# Patient Record
Sex: MALE | Race: WHITE | Employment: OTHER | URBAN - METROPOLITAN AREA
[De-identification: names, ages, dates, MRNs, and addresses within clinical notes are randomized per-mention and may not be internally consistent; named-entity substitution may affect disease eponyms.]

---

## 2017-01-10 ENCOUNTER — OFFICE VISIT (OUTPATIENT)
Dept: FAMILY MEDICINE CLINIC | Age: 67
End: 2017-01-10

## 2017-01-10 VITALS
HEIGHT: 68 IN | TEMPERATURE: 96.6 F | RESPIRATION RATE: 16 BRPM | BODY MASS INDEX: 29.77 KG/M2 | DIASTOLIC BLOOD PRESSURE: 86 MMHG | SYSTOLIC BLOOD PRESSURE: 130 MMHG | WEIGHT: 196.4 LBS | HEART RATE: 80 BPM

## 2017-01-10 DIAGNOSIS — R73.9 ELEVATED BLOOD SUGAR: ICD-10-CM

## 2017-01-10 DIAGNOSIS — Z12.5 SPECIAL SCREENING FOR MALIGNANT NEOPLASM OF PROSTATE: ICD-10-CM

## 2017-01-10 DIAGNOSIS — I10 ESSENTIAL HYPERTENSION: ICD-10-CM

## 2017-01-10 DIAGNOSIS — M15.9 PRIMARY OSTEOARTHRITIS INVOLVING MULTIPLE JOINTS: ICD-10-CM

## 2017-01-10 DIAGNOSIS — I10 ESSENTIAL HYPERTENSION: Primary | ICD-10-CM

## 2017-01-10 DIAGNOSIS — E78.2 MIXED HYPERLIPIDEMIA: ICD-10-CM

## 2017-01-10 DIAGNOSIS — M25.561 RECURRENT PAIN OF RIGHT KNEE: ICD-10-CM

## 2017-01-10 LAB
ALBUMIN SERPL-MCNC: 5 G/DL (ref 3.9–4.9)
ALP BLD-CCNC: 87 U/L (ref 35–104)
ALT SERPL-CCNC: 23 U/L (ref 0–41)
ANION GAP SERPL CALCULATED.3IONS-SCNC: 15 MEQ/L (ref 7–13)
AST SERPL-CCNC: 22 U/L (ref 0–40)
BASOPHILS ABSOLUTE: 0 K/UL (ref 0–0.2)
BASOPHILS RELATIVE PERCENT: 0.5 %
BILIRUB SERPL-MCNC: 0.6 MG/DL (ref 0–1.2)
BUN BLDV-MCNC: 18 MG/DL (ref 8–23)
CALCIUM SERPL-MCNC: 9.7 MG/DL (ref 8.6–10.2)
CHLORIDE BLD-SCNC: 100 MEQ/L (ref 98–107)
CHOLESTEROL, TOTAL: 276 MG/DL (ref 0–199)
CO2: 25 MEQ/L (ref 22–29)
CREAT SERPL-MCNC: 1.32 MG/DL (ref 0.7–1.2)
EOSINOPHILS ABSOLUTE: 0.2 K/UL (ref 0–0.7)
EOSINOPHILS RELATIVE PERCENT: 4.3 %
GFR AFRICAN AMERICAN: >60
GFR NON-AFRICAN AMERICAN: 54
GLOBULIN: 2 G/DL (ref 2.3–3.5)
GLUCOSE BLD-MCNC: 103 MG/DL (ref 74–109)
HBA1C MFR BLD: 5.7 % (ref 4.8–5.9)
HCT VFR BLD CALC: 44.1 % (ref 42–52)
HDLC SERPL-MCNC: 45 MG/DL (ref 40–59)
HEMOGLOBIN: 15.5 G/DL (ref 14–18)
LDL CHOLESTEROL CALCULATED: 205 MG/DL (ref 0–129)
LYMPHOCYTES ABSOLUTE: 1.4 K/UL (ref 1–4.8)
LYMPHOCYTES RELATIVE PERCENT: 29.5 %
MCH RBC QN AUTO: 31.4 PG (ref 27–31.3)
MCHC RBC AUTO-ENTMCNC: 35.1 % (ref 33–37)
MCV RBC AUTO: 89.5 FL (ref 80–100)
MONOCYTES ABSOLUTE: 0.3 K/UL (ref 0.2–0.8)
MONOCYTES RELATIVE PERCENT: 6.2 %
NEUTROPHILS ABSOLUTE: 2.9 K/UL (ref 1.4–6.5)
NEUTROPHILS RELATIVE PERCENT: 59.5 %
PDW BLD-RTO: 12.9 % (ref 11.5–14.5)
PLATELET # BLD: 174 K/UL (ref 130–400)
POTASSIUM SERPL-SCNC: 4.9 MEQ/L (ref 3.5–5.1)
PROSTATE SPECIFIC ANTIGEN: 5.02 NG/ML (ref 0–5.4)
RBC # BLD: 4.93 M/UL (ref 4.7–6.1)
SODIUM BLD-SCNC: 140 MEQ/L (ref 132–144)
TOTAL PROTEIN: 7 G/DL (ref 6.4–8.1)
TRIGL SERPL-MCNC: 130 MG/DL (ref 0–200)
WBC # BLD: 4.9 K/UL (ref 4.8–10.8)

## 2017-01-10 PROCEDURE — 99214 OFFICE O/P EST MOD 30 MIN: CPT | Performed by: FAMILY MEDICINE

## 2017-01-10 RX ORDER — METHYLPREDNISOLONE 4 MG/1
TABLET ORAL
Qty: 1 KIT | Refills: 0 | Status: SHIPPED | OUTPATIENT
Start: 2017-01-10 | End: 2017-07-18 | Stop reason: ALTCHOICE

## 2017-01-10 ASSESSMENT — ENCOUNTER SYMPTOMS
COUGH: 0
CONSTIPATION: 0
ABDOMINAL PAIN: 0
NAUSEA: 0
EYES NEGATIVE: 1
DIARRHEA: 0
SHORTNESS OF BREATH: 0

## 2017-01-16 ENCOUNTER — TELEPHONE (OUTPATIENT)
Dept: FAMILY MEDICINE CLINIC | Age: 67
End: 2017-01-16

## 2017-01-20 ENCOUNTER — HOSPITAL ENCOUNTER (OUTPATIENT)
Dept: NUTRITION | Age: 67
Discharge: HOME | End: 2017-01-20
Payer: MEDICARE

## 2017-01-20 PROCEDURE — 97802 MEDICAL NUTRITION INDIV IN: CPT

## 2017-07-18 ENCOUNTER — OFFICE VISIT (OUTPATIENT)
Dept: FAMILY MEDICINE CLINIC | Age: 67
End: 2017-07-18

## 2017-07-18 VITALS
RESPIRATION RATE: 12 BRPM | WEIGHT: 200.4 LBS | DIASTOLIC BLOOD PRESSURE: 86 MMHG | HEIGHT: 68 IN | TEMPERATURE: 97.3 F | HEART RATE: 68 BPM | SYSTOLIC BLOOD PRESSURE: 138 MMHG | BODY MASS INDEX: 30.37 KG/M2

## 2017-07-18 DIAGNOSIS — R79.89 LOW TESTOSTERONE: ICD-10-CM

## 2017-07-18 DIAGNOSIS — E78.2 MIXED HYPERLIPIDEMIA: ICD-10-CM

## 2017-07-18 DIAGNOSIS — H92.01 RIGHT EAR PAIN: Primary | ICD-10-CM

## 2017-07-18 DIAGNOSIS — I10 ESSENTIAL HYPERTENSION: ICD-10-CM

## 2017-07-18 DIAGNOSIS — J01.00 ACUTE MAXILLARY SINUSITIS, RECURRENCE NOT SPECIFIED: ICD-10-CM

## 2017-07-18 PROCEDURE — 1123F ACP DISCUSS/DSCN MKR DOCD: CPT | Performed by: FAMILY MEDICINE

## 2017-07-18 PROCEDURE — 1036F TOBACCO NON-USER: CPT | Performed by: FAMILY MEDICINE

## 2017-07-18 PROCEDURE — G8427 DOCREV CUR MEDS BY ELIG CLIN: HCPCS | Performed by: FAMILY MEDICINE

## 2017-07-18 PROCEDURE — G8417 CALC BMI ABV UP PARAM F/U: HCPCS | Performed by: FAMILY MEDICINE

## 2017-07-18 PROCEDURE — 3017F COLORECTAL CA SCREEN DOC REV: CPT | Performed by: FAMILY MEDICINE

## 2017-07-18 PROCEDURE — 4040F PNEUMOC VAC/ADMIN/RCVD: CPT | Performed by: FAMILY MEDICINE

## 2017-07-18 PROCEDURE — 99213 OFFICE O/P EST LOW 20 MIN: CPT | Performed by: FAMILY MEDICINE

## 2017-07-18 RX ORDER — CEFUROXIME AXETIL 250 MG/1
250 TABLET ORAL 2 TIMES DAILY
Qty: 20 TABLET | Refills: 0 | Status: SHIPPED | OUTPATIENT
Start: 2017-07-18 | End: 2017-07-28

## 2017-07-18 ASSESSMENT — ENCOUNTER SYMPTOMS
NAUSEA: 0
EYES NEGATIVE: 1
RHINORRHEA: 1
SHORTNESS OF BREATH: 0
DIARRHEA: 0
CONSTIPATION: 0
COUGH: 0
SINUS PRESSURE: 1
ABDOMINAL PAIN: 0

## 2017-07-18 ASSESSMENT — PATIENT HEALTH QUESTIONNAIRE - PHQ9
SUM OF ALL RESPONSES TO PHQ QUESTIONS 1-9: 0
SUM OF ALL RESPONSES TO PHQ9 QUESTIONS 1 & 2: 0
2. FEELING DOWN, DEPRESSED OR HOPELESS: 0
1. LITTLE INTEREST OR PLEASURE IN DOING THINGS: 0

## 2017-07-26 ENCOUNTER — TELEPHONE (OUTPATIENT)
Dept: FAMILY MEDICINE CLINIC | Age: 67
End: 2017-07-26

## 2017-07-29 ENCOUNTER — OFFICE VISIT (OUTPATIENT)
Dept: PRIMARY CARE CLINIC | Age: 67
End: 2017-07-29

## 2017-07-29 VITALS
SYSTOLIC BLOOD PRESSURE: 130 MMHG | RESPIRATION RATE: 16 BRPM | BODY MASS INDEX: 29.7 KG/M2 | TEMPERATURE: 97.9 F | HEIGHT: 68 IN | WEIGHT: 196 LBS | DIASTOLIC BLOOD PRESSURE: 82 MMHG | OXYGEN SATURATION: 97 % | HEART RATE: 93 BPM

## 2017-07-29 DIAGNOSIS — E87.8 ELECTROLYTE IMBALANCE: ICD-10-CM

## 2017-07-29 DIAGNOSIS — A04.72: ICD-10-CM

## 2017-07-29 DIAGNOSIS — I10 ESSENTIAL HYPERTENSION: ICD-10-CM

## 2017-07-29 DIAGNOSIS — A04.72: Primary | ICD-10-CM

## 2017-07-29 LAB
ALBUMIN SERPL-MCNC: 4.5 G/DL (ref 3.9–4.9)
ALP BLD-CCNC: 88 U/L (ref 35–104)
ALT SERPL-CCNC: 18 U/L (ref 0–41)
ANION GAP SERPL CALCULATED.3IONS-SCNC: 16 MEQ/L (ref 7–13)
AST SERPL-CCNC: 17 U/L (ref 0–40)
BASOPHILS ABSOLUTE: 0 K/UL (ref 0–0.2)
BASOPHILS RELATIVE PERCENT: 0.3 %
BILIRUB SERPL-MCNC: 0.8 MG/DL (ref 0–1.2)
BUN BLDV-MCNC: 14 MG/DL (ref 8–23)
CALCIUM SERPL-MCNC: 9.1 MG/DL (ref 8.6–10.2)
CHLORIDE BLD-SCNC: 99 MEQ/L (ref 98–107)
CO2: 24 MEQ/L (ref 22–29)
CREAT SERPL-MCNC: 1.17 MG/DL (ref 0.7–1.2)
EOSINOPHILS ABSOLUTE: 0.4 K/UL (ref 0–0.7)
EOSINOPHILS RELATIVE PERCENT: 4.6 %
GFR AFRICAN AMERICAN: >60
GFR NON-AFRICAN AMERICAN: >60
GLOBULIN: 2.7 G/DL (ref 2.3–3.5)
GLUCOSE BLD-MCNC: 91 MG/DL (ref 74–109)
HCT VFR BLD CALC: 45.7 % (ref 42–52)
HEMOGLOBIN: 15.4 G/DL (ref 14–18)
LYMPHOCYTES ABSOLUTE: 1.9 K/UL (ref 1–4.8)
LYMPHOCYTES RELATIVE PERCENT: 20.4 %
MCH RBC QN AUTO: 30.5 PG (ref 27–31.3)
MCHC RBC AUTO-ENTMCNC: 33.7 % (ref 33–37)
MCV RBC AUTO: 90.5 FL (ref 80–100)
MONOCYTES ABSOLUTE: 0.8 K/UL (ref 0.2–0.8)
MONOCYTES RELATIVE PERCENT: 9 %
NEUTROPHILS ABSOLUTE: 6 K/UL (ref 1.4–6.5)
NEUTROPHILS RELATIVE PERCENT: 65.7 %
PDW BLD-RTO: 12.6 % (ref 11.5–14.5)
PLATELET # BLD: 172 K/UL (ref 130–400)
POTASSIUM SERPL-SCNC: 4.4 MEQ/L (ref 3.5–5.1)
RBC # BLD: 5.05 M/UL (ref 4.7–6.1)
SODIUM BLD-SCNC: 139 MEQ/L (ref 132–144)
TOTAL PROTEIN: 7.2 G/DL (ref 6.4–8.1)
WBC # BLD: 9.2 K/UL (ref 4.8–10.8)

## 2017-07-29 PROCEDURE — 1036F TOBACCO NON-USER: CPT | Performed by: FAMILY MEDICINE

## 2017-07-29 PROCEDURE — G8427 DOCREV CUR MEDS BY ELIG CLIN: HCPCS | Performed by: FAMILY MEDICINE

## 2017-07-29 PROCEDURE — 1123F ACP DISCUSS/DSCN MKR DOCD: CPT | Performed by: FAMILY MEDICINE

## 2017-07-29 PROCEDURE — 3017F COLORECTAL CA SCREEN DOC REV: CPT | Performed by: FAMILY MEDICINE

## 2017-07-29 PROCEDURE — 99213 OFFICE O/P EST LOW 20 MIN: CPT | Performed by: FAMILY MEDICINE

## 2017-07-29 PROCEDURE — 4040F PNEUMOC VAC/ADMIN/RCVD: CPT | Performed by: FAMILY MEDICINE

## 2017-07-29 PROCEDURE — G8417 CALC BMI ABV UP PARAM F/U: HCPCS | Performed by: FAMILY MEDICINE

## 2017-07-29 RX ORDER — METRONIDAZOLE 500 MG/1
500 TABLET ORAL 3 TIMES DAILY
Qty: 30 TABLET | Refills: 0 | Status: SHIPPED | OUTPATIENT
Start: 2017-07-29 | End: 2017-08-08

## 2017-07-29 ASSESSMENT — ENCOUNTER SYMPTOMS
COUGH: 0
CONSTIPATION: 0
BACK PAIN: 0
WHEEZING: 0
DIARRHEA: 1
SHORTNESS OF BREATH: 0
SORE THROAT: 0
NAUSEA: 0
VOMITING: 0
ABDOMINAL PAIN: 0

## 2017-07-31 ENCOUNTER — TELEPHONE (OUTPATIENT)
Dept: FAMILY MEDICINE CLINIC | Age: 67
End: 2017-07-31

## 2017-08-01 DIAGNOSIS — A04.72: ICD-10-CM

## 2017-08-02 LAB — CLOSTRIDIUM DIFFICILE DNA AMPLIFICATION: NORMAL

## 2017-08-03 ENCOUNTER — TELEPHONE (OUTPATIENT)
Dept: PRIMARY CARE CLINIC | Age: 67
End: 2017-08-03

## 2017-08-12 ASSESSMENT — ENCOUNTER SYMPTOMS: BLOOD IN STOOL: 0

## 2017-09-05 ENCOUNTER — OFFICE VISIT (OUTPATIENT)
Dept: FAMILY MEDICINE CLINIC | Age: 67
End: 2017-09-05

## 2017-09-05 VITALS
RESPIRATION RATE: 12 BRPM | TEMPERATURE: 97.2 F | HEART RATE: 80 BPM | BODY MASS INDEX: 30.46 KG/M2 | HEIGHT: 68 IN | WEIGHT: 201 LBS | SYSTOLIC BLOOD PRESSURE: 118 MMHG | DIASTOLIC BLOOD PRESSURE: 84 MMHG

## 2017-09-05 DIAGNOSIS — J01.00 ACUTE MAXILLARY SINUSITIS, RECURRENCE NOT SPECIFIED: ICD-10-CM

## 2017-09-05 DIAGNOSIS — I10 ESSENTIAL HYPERTENSION: ICD-10-CM

## 2017-09-05 DIAGNOSIS — H92.01 RIGHT EAR PAIN: Primary | ICD-10-CM

## 2017-09-05 PROCEDURE — 1123F ACP DISCUSS/DSCN MKR DOCD: CPT | Performed by: FAMILY MEDICINE

## 2017-09-05 PROCEDURE — 1036F TOBACCO NON-USER: CPT | Performed by: FAMILY MEDICINE

## 2017-09-05 PROCEDURE — G8417 CALC BMI ABV UP PARAM F/U: HCPCS | Performed by: FAMILY MEDICINE

## 2017-09-05 PROCEDURE — G8427 DOCREV CUR MEDS BY ELIG CLIN: HCPCS | Performed by: FAMILY MEDICINE

## 2017-09-05 PROCEDURE — 99213 OFFICE O/P EST LOW 20 MIN: CPT | Performed by: FAMILY MEDICINE

## 2017-09-05 PROCEDURE — 3017F COLORECTAL CA SCREEN DOC REV: CPT | Performed by: FAMILY MEDICINE

## 2017-09-05 PROCEDURE — 4040F PNEUMOC VAC/ADMIN/RCVD: CPT | Performed by: FAMILY MEDICINE

## 2017-09-05 RX ORDER — AMLODIPINE BESYLATE 10 MG/1
TABLET ORAL
COMMUNITY
Start: 2017-09-05 | End: 2018-03-27 | Stop reason: ALTCHOICE

## 2017-09-05 RX ORDER — AZITHROMYCIN 250 MG/1
TABLET, FILM COATED ORAL
Qty: 6 TABLET | Refills: 0 | Status: SHIPPED | OUTPATIENT
Start: 2017-09-05 | End: 2017-09-28 | Stop reason: ALTCHOICE

## 2017-09-05 ASSESSMENT — ENCOUNTER SYMPTOMS
DIARRHEA: 0
SINUS PRESSURE: 1
CONSTIPATION: 0
ABDOMINAL PAIN: 0
EYES NEGATIVE: 1
SHORTNESS OF BREATH: 0
RHINORRHEA: 1
COUGH: 0
NAUSEA: 0

## 2017-09-09 ENCOUNTER — TELEPHONE (OUTPATIENT)
Dept: FAMILY MEDICINE CLINIC | Age: 67
End: 2017-09-09

## 2017-09-28 ENCOUNTER — OFFICE VISIT (OUTPATIENT)
Dept: FAMILY MEDICINE CLINIC | Age: 67
End: 2017-09-28

## 2017-09-28 VITALS
HEIGHT: 68 IN | HEART RATE: 72 BPM | WEIGHT: 199.4 LBS | BODY MASS INDEX: 30.22 KG/M2 | TEMPERATURE: 96.2 F | DIASTOLIC BLOOD PRESSURE: 86 MMHG | RESPIRATION RATE: 16 BRPM | SYSTOLIC BLOOD PRESSURE: 130 MMHG

## 2017-09-28 DIAGNOSIS — J01.00 ACUTE MAXILLARY SINUSITIS, RECURRENCE NOT SPECIFIED: ICD-10-CM

## 2017-09-28 DIAGNOSIS — H69.81 EUSTACHIAN TUBE DYSFUNCTION, RIGHT: Primary | ICD-10-CM

## 2017-09-28 DIAGNOSIS — H92.01 RIGHT EAR PAIN: ICD-10-CM

## 2017-09-28 DIAGNOSIS — I10 ESSENTIAL HYPERTENSION: ICD-10-CM

## 2017-09-28 PROCEDURE — 3017F COLORECTAL CA SCREEN DOC REV: CPT | Performed by: FAMILY MEDICINE

## 2017-09-28 PROCEDURE — 1123F ACP DISCUSS/DSCN MKR DOCD: CPT | Performed by: FAMILY MEDICINE

## 2017-09-28 PROCEDURE — 1036F TOBACCO NON-USER: CPT | Performed by: FAMILY MEDICINE

## 2017-09-28 PROCEDURE — 4040F PNEUMOC VAC/ADMIN/RCVD: CPT | Performed by: FAMILY MEDICINE

## 2017-09-28 PROCEDURE — G8417 CALC BMI ABV UP PARAM F/U: HCPCS | Performed by: FAMILY MEDICINE

## 2017-09-28 PROCEDURE — 99213 OFFICE O/P EST LOW 20 MIN: CPT | Performed by: FAMILY MEDICINE

## 2017-09-28 PROCEDURE — G8427 DOCREV CUR MEDS BY ELIG CLIN: HCPCS | Performed by: FAMILY MEDICINE

## 2017-09-28 RX ORDER — AZITHROMYCIN 250 MG/1
TABLET, FILM COATED ORAL
Qty: 6 TABLET | Refills: 0 | Status: SHIPPED | OUTPATIENT
Start: 2017-09-28 | End: 2018-03-27 | Stop reason: ALTCHOICE

## 2017-09-28 RX ORDER — MOMETASONE FUROATE 50 UG/1
2 SPRAY, METERED NASAL DAILY
Qty: 1 INHALER | Refills: 3 | Status: SHIPPED | OUTPATIENT
Start: 2017-09-28 | End: 2018-03-27 | Stop reason: ALTCHOICE

## 2017-09-28 ASSESSMENT — ENCOUNTER SYMPTOMS
CONSTIPATION: 0
SHORTNESS OF BREATH: 0
COUGH: 0
EYES NEGATIVE: 1
NAUSEA: 0
DIARRHEA: 0
ABDOMINAL PAIN: 0

## 2018-03-27 ENCOUNTER — OFFICE VISIT (OUTPATIENT)
Dept: FAMILY MEDICINE CLINIC | Age: 68
End: 2018-03-27
Payer: MEDICARE

## 2018-03-27 VITALS
HEART RATE: 85 BPM | HEIGHT: 67 IN | TEMPERATURE: 98.1 F | WEIGHT: 201.4 LBS | RESPIRATION RATE: 20 BRPM | SYSTOLIC BLOOD PRESSURE: 138 MMHG | BODY MASS INDEX: 31.61 KG/M2 | DIASTOLIC BLOOD PRESSURE: 88 MMHG | OXYGEN SATURATION: 92 %

## 2018-03-27 DIAGNOSIS — Z00.00 ROUTINE GENERAL MEDICAL EXAMINATION AT A HEALTH CARE FACILITY: ICD-10-CM

## 2018-03-27 DIAGNOSIS — E78.00 HYPERCHOLESTEREMIA: ICD-10-CM

## 2018-03-27 DIAGNOSIS — Z12.5 SCREENING PSA (PROSTATE SPECIFIC ANTIGEN): ICD-10-CM

## 2018-03-27 DIAGNOSIS — I10 ESSENTIAL HYPERTENSION: ICD-10-CM

## 2018-03-27 DIAGNOSIS — Z12.5 SCREENING PSA (PROSTATE SPECIFIC ANTIGEN): Primary | ICD-10-CM

## 2018-03-27 DIAGNOSIS — Z00.00 MEDICARE ANNUAL WELLNESS VISIT, SUBSEQUENT: ICD-10-CM

## 2018-03-27 DIAGNOSIS — Z23 IMMUNIZATION DUE: ICD-10-CM

## 2018-03-27 DIAGNOSIS — R79.89 LOW TESTOSTERONE: ICD-10-CM

## 2018-03-27 LAB
ALBUMIN SERPL-MCNC: 4.8 G/DL (ref 3.9–4.9)
ALP BLD-CCNC: 83 U/L (ref 35–104)
ALT SERPL-CCNC: 26 U/L (ref 0–41)
ANION GAP SERPL CALCULATED.3IONS-SCNC: 17 MEQ/L (ref 7–13)
AST SERPL-CCNC: 22 U/L (ref 0–40)
BASOPHILS ABSOLUTE: 0 K/UL (ref 0–0.2)
BASOPHILS RELATIVE PERCENT: 0.4 %
BILIRUB SERPL-MCNC: 0.6 MG/DL (ref 0–1.2)
BUN BLDV-MCNC: 13 MG/DL (ref 8–23)
CALCIUM SERPL-MCNC: 9.1 MG/DL (ref 8.6–10.2)
CHLORIDE BLD-SCNC: 98 MEQ/L (ref 98–107)
CHOLESTEROL, TOTAL: 258 MG/DL (ref 0–199)
CO2: 25 MEQ/L (ref 22–29)
CREAT SERPL-MCNC: 1.04 MG/DL (ref 0.7–1.2)
EOSINOPHILS ABSOLUTE: 0.1 K/UL (ref 0–0.7)
EOSINOPHILS RELATIVE PERCENT: 1.8 %
GFR AFRICAN AMERICAN: >60
GFR NON-AFRICAN AMERICAN: >60
GLOBULIN: 2.4 G/DL (ref 2.3–3.5)
GLUCOSE BLD-MCNC: 90 MG/DL (ref 74–109)
HCT VFR BLD CALC: 44.9 % (ref 42–52)
HDLC SERPL-MCNC: 42 MG/DL (ref 40–59)
HEMOGLOBIN: 15.3 G/DL (ref 14–18)
LDL CHOLESTEROL CALCULATED: 196 MG/DL (ref 0–129)
LYMPHOCYTES ABSOLUTE: 1.1 K/UL (ref 1–4.8)
LYMPHOCYTES RELATIVE PERCENT: 16.5 %
MCH RBC QN AUTO: 31.5 PG (ref 27–31.3)
MCHC RBC AUTO-ENTMCNC: 34.1 % (ref 33–37)
MCV RBC AUTO: 92.3 FL (ref 80–100)
MONOCYTES ABSOLUTE: 0.3 K/UL (ref 0.2–0.8)
MONOCYTES RELATIVE PERCENT: 4.9 %
NEUTROPHILS ABSOLUTE: 5.2 K/UL (ref 1.4–6.5)
NEUTROPHILS RELATIVE PERCENT: 76.4 %
PDW BLD-RTO: 12.9 % (ref 11.5–14.5)
PLATELET # BLD: 164 K/UL (ref 130–400)
POTASSIUM SERPL-SCNC: 4.2 MEQ/L (ref 3.5–5.1)
PROSTATE SPECIFIC ANTIGEN: 6.08 NG/ML (ref 0–5.4)
RBC # BLD: 4.86 M/UL (ref 4.7–6.1)
SODIUM BLD-SCNC: 140 MEQ/L (ref 132–144)
TOTAL PROTEIN: 7.2 G/DL (ref 6.4–8.1)
TRIGL SERPL-MCNC: 102 MG/DL (ref 0–200)
WBC # BLD: 6.8 K/UL (ref 4.8–10.8)

## 2018-03-27 PROCEDURE — G0009 ADMIN PNEUMOCOCCAL VACCINE: HCPCS | Performed by: FAMILY MEDICINE

## 2018-03-27 PROCEDURE — G0438 PPPS, INITIAL VISIT: HCPCS | Performed by: FAMILY MEDICINE

## 2018-03-27 PROCEDURE — 90732 PPSV23 VACC 2 YRS+ SUBQ/IM: CPT | Performed by: FAMILY MEDICINE

## 2018-03-27 ASSESSMENT — LIFESTYLE VARIABLES
HOW OFTEN DURING THE LAST YEAR HAVE YOU FOUND THAT YOU WERE NOT ABLE TO STOP DRINKING ONCE YOU HAD STARTED: 0
AUDIT TOTAL SCORE: 1
HAS A RELATIVE, FRIEND, DOCTOR, OR ANOTHER HEALTH PROFESSIONAL EXPRESSED CONCERN ABOUT YOUR DRINKING OR SUGGESTED YOU CUT DOWN: 0
HOW OFTEN DURING THE LAST YEAR HAVE YOU FAILED TO DO WHAT WAS NORMALLY EXPECTED FROM YOU BECAUSE OF DRINKING: 0
HOW OFTEN DO YOU HAVE SIX OR MORE DRINKS ON ONE OCCASION: 0
HOW MANY STANDARD DRINKS CONTAINING ALCOHOL DO YOU HAVE ON A TYPICAL DAY: 0
HAVE YOU OR SOMEONE ELSE BEEN INJURED AS A RESULT OF YOUR DRINKING: 0
HOW OFTEN DO YOU HAVE A DRINK CONTAINING ALCOHOL: 1
HOW OFTEN DURING THE LAST YEAR HAVE YOU HAD A FEELING OF GUILT OR REMORSE AFTER DRINKING: 0
HOW OFTEN DURING THE LAST YEAR HAVE YOU BEEN UNABLE TO REMEMBER WHAT HAPPENED THE NIGHT BEFORE BECAUSE YOU HAD BEEN DRINKING: 0
AUDIT-C TOTAL SCORE: 1
HOW OFTEN DURING THE LAST YEAR HAVE YOU NEEDED AN ALCOHOLIC DRINK FIRST THING IN THE MORNING TO GET YOURSELF GOING AFTER A NIGHT OF HEAVY DRINKING: 0

## 2018-03-27 ASSESSMENT — ANXIETY QUESTIONNAIRES: GAD7 TOTAL SCORE: 0

## 2018-03-27 ASSESSMENT — PATIENT HEALTH QUESTIONNAIRE - PHQ9: SUM OF ALL RESPONSES TO PHQ QUESTIONS 1-9: 2

## 2018-06-05 DIAGNOSIS — R97.20 ELEVATED PSA: Primary | ICD-10-CM

## 2018-06-06 DIAGNOSIS — R97.20 ELEVATED PSA: ICD-10-CM

## 2018-06-06 DIAGNOSIS — R97.20 ELEVATED PSA: Primary | ICD-10-CM

## 2018-06-08 LAB
PROSTATE SPECIFIC ANTIGEN FREE: 0.7 UG/L
PROSTATE SPECIFIC ANTIGEN PERCENT FREE: 14.9 %
PROSTATE SPECIFIC ANTIGEN: 4.7 UG/L (ref 0–4)

## 2018-06-12 ENCOUNTER — TELEPHONE (OUTPATIENT)
Dept: FAMILY MEDICINE CLINIC | Age: 68
End: 2018-06-12

## 2023-02-27 LAB
ALANINE AMINOTRANSFERASE (SGPT) (U/L) IN SER/PLAS: 15 U/L (ref 10–52)
CHOLESTEROL (MG/DL) IN SER/PLAS: 193 MG/DL (ref 0–199)
CHOLESTEROL IN HDL (MG/DL) IN SER/PLAS: 46.9 MG/DL
CHOLESTEROL/HDL RATIO: 4.1
LDL: 118 MG/DL (ref 0–99)
TRIGLYCERIDE (MG/DL) IN SER/PLAS: 141 MG/DL (ref 0–149)
VLDL: 28 MG/DL (ref 0–40)

## 2023-04-24 ENCOUNTER — TELEPHONE (OUTPATIENT)
Dept: PRIMARY CARE | Facility: CLINIC | Age: 73
End: 2023-04-24
Payer: MEDICARE

## 2023-04-24 DIAGNOSIS — R53.83 OTHER FATIGUE: ICD-10-CM

## 2023-04-24 NOTE — TELEPHONE ENCOUNTER
Pt is calling because he states he hasn't had labs done in about a year. Wanted to know if you could order what labs he will need.     Thanks    Pt's # [phone]    DOL visit 1/9/23

## 2023-04-25 ENCOUNTER — TELEPHONE (OUTPATIENT)
Dept: PRIMARY CARE | Facility: CLINIC | Age: 73
End: 2023-04-25
Payer: MEDICARE

## 2023-04-25 DIAGNOSIS — E78.00 HYPERCHOLESTEROLEMIA: Primary | ICD-10-CM

## 2023-04-25 DIAGNOSIS — R73.9 ELEVATED BLOOD SUGAR: ICD-10-CM

## 2023-04-25 NOTE — TELEPHONE ENCOUNTER
PATIENT CALLING TO SEE IF YOU CAN ADD OTHER LABS.  HE IS REQUESTING A1-C GLUCOSE,KIDNEY FUNCTION AND LIPID

## 2023-05-10 ENCOUNTER — LAB (OUTPATIENT)
Dept: LAB | Facility: LAB | Age: 73
End: 2023-05-10
Payer: MEDICARE

## 2023-05-10 DIAGNOSIS — R53.83 OTHER FATIGUE: ICD-10-CM

## 2023-05-10 DIAGNOSIS — E78.00 HYPERCHOLESTEROLEMIA: ICD-10-CM

## 2023-05-10 DIAGNOSIS — R73.9 ELEVATED BLOOD SUGAR: ICD-10-CM

## 2023-05-10 LAB
ANION GAP IN SER/PLAS: 12 MMOL/L (ref 10–20)
BASOPHILS (10*3/UL) IN BLOOD BY AUTOMATED COUNT: 0.03 X10E9/L (ref 0–0.1)
BASOPHILS/100 LEUKOCYTES IN BLOOD BY AUTOMATED COUNT: 0.7 % (ref 0–2)
CALCIUM (MG/DL) IN SER/PLAS: 9.3 MG/DL (ref 8.6–10.3)
CARBON DIOXIDE, TOTAL (MMOL/L) IN SER/PLAS: 26 MMOL/L (ref 21–32)
CHLORIDE (MMOL/L) IN SER/PLAS: 104 MMOL/L (ref 98–107)
CHOLESTEROL (MG/DL) IN SER/PLAS: 204 MG/DL (ref 0–199)
CHOLESTEROL IN HDL (MG/DL) IN SER/PLAS: 44.2 MG/DL
CHOLESTEROL/HDL RATIO: 4.6
CREATININE (MG/DL) IN SER/PLAS: 1.21 MG/DL (ref 0.5–1.3)
EOSINOPHILS (10*3/UL) IN BLOOD BY AUTOMATED COUNT: 0.15 X10E9/L (ref 0–0.4)
EOSINOPHILS/100 LEUKOCYTES IN BLOOD BY AUTOMATED COUNT: 3.7 % (ref 0–6)
ERYTHROCYTE DISTRIBUTION WIDTH (RATIO) BY AUTOMATED COUNT: 12.4 % (ref 11.5–14.5)
ERYTHROCYTE MEAN CORPUSCULAR HEMOGLOBIN CONCENTRATION (G/DL) BY AUTOMATED: 33.6 G/DL (ref 32–36)
ERYTHROCYTE MEAN CORPUSCULAR VOLUME (FL) BY AUTOMATED COUNT: 93 FL (ref 80–100)
ERYTHROCYTES (10*6/UL) IN BLOOD BY AUTOMATED COUNT: 4.46 X10E12/L (ref 4.5–5.9)
ESTIMATED AVERAGE GLUCOSE FOR HBA1C: 114 MG/DL
GFR MALE: 63 ML/MIN/1.73M2
GLUCOSE (MG/DL) IN SER/PLAS: 114 MG/DL (ref 74–99)
HEMATOCRIT (%) IN BLOOD BY AUTOMATED COUNT: 41.4 % (ref 41–52)
HEMOGLOBIN (G/DL) IN BLOOD: 13.9 G/DL (ref 13.5–17.5)
HEMOGLOBIN A1C/HEMOGLOBIN TOTAL IN BLOOD: 5.6 %
IMMATURE GRANULOCYTES/100 LEUKOCYTES IN BLOOD BY AUTOMATED COUNT: 0.2 % (ref 0–0.9)
LDL: 136 MG/DL (ref 0–99)
LEUKOCYTES (10*3/UL) IN BLOOD BY AUTOMATED COUNT: 4 X10E9/L (ref 4.4–11.3)
LYMPHOCYTES (10*3/UL) IN BLOOD BY AUTOMATED COUNT: 1.15 X10E9/L (ref 0.8–3)
LYMPHOCYTES/100 LEUKOCYTES IN BLOOD BY AUTOMATED COUNT: 28.6 % (ref 13–44)
MONOCYTES (10*3/UL) IN BLOOD BY AUTOMATED COUNT: 0.28 X10E9/L (ref 0.05–0.8)
MONOCYTES/100 LEUKOCYTES IN BLOOD BY AUTOMATED COUNT: 7 % (ref 2–10)
NEUTROPHILS (10*3/UL) IN BLOOD BY AUTOMATED COUNT: 2.4 X10E9/L (ref 1.6–5.5)
NEUTROPHILS/100 LEUKOCYTES IN BLOOD BY AUTOMATED COUNT: 59.8 % (ref 40–80)
PLATELETS (10*3/UL) IN BLOOD AUTOMATED COUNT: 159 X10E9/L (ref 150–450)
POTASSIUM (MMOL/L) IN SER/PLAS: 4.2 MMOL/L (ref 3.5–5.3)
SODIUM (MMOL/L) IN SER/PLAS: 138 MMOL/L (ref 136–145)
TRIGLYCERIDE (MG/DL) IN SER/PLAS: 121 MG/DL (ref 0–149)
UREA NITROGEN (MG/DL) IN SER/PLAS: 21 MG/DL (ref 6–23)
VLDL: 24 MG/DL (ref 0–40)

## 2023-05-10 PROCEDURE — 84403 ASSAY OF TOTAL TESTOSTERONE: CPT

## 2023-05-10 PROCEDURE — 80048 BASIC METABOLIC PNL TOTAL CA: CPT

## 2023-05-10 PROCEDURE — 83036 HEMOGLOBIN GLYCOSYLATED A1C: CPT

## 2023-05-10 PROCEDURE — 85025 COMPLETE CBC W/AUTO DIFF WBC: CPT

## 2023-05-10 PROCEDURE — 36415 COLL VENOUS BLD VENIPUNCTURE: CPT

## 2023-05-10 PROCEDURE — 80061 LIPID PANEL: CPT

## 2023-05-10 NOTE — PROGRESS NOTES
Patient called, he is at the lab and requested a urinalysis to check kidney function and testosterone level.   I did explain to him that a urinalysis does not check kidney function, and that the kidney function would be checked via blood work and those labs were already ordered.   I did ask Dr. Chan if it was ok to order the Testosterone level, and received a verbal okay.   Testosterone level was ordered.   Patient aware

## 2023-05-11 LAB — TESTOSTERONE (NG/DL) IN SER/PLAS: 381 NG/DL (ref 240–1000)

## 2023-05-12 ENCOUNTER — OFFICE VISIT (OUTPATIENT)
Dept: PRIMARY CARE | Facility: CLINIC | Age: 73
End: 2023-05-12
Payer: MEDICARE

## 2023-05-12 VITALS
BODY MASS INDEX: 29.16 KG/M2 | SYSTOLIC BLOOD PRESSURE: 138 MMHG | TEMPERATURE: 98.6 F | OXYGEN SATURATION: 97 % | HEIGHT: 67 IN | DIASTOLIC BLOOD PRESSURE: 80 MMHG | RESPIRATION RATE: 12 BRPM | WEIGHT: 185.8 LBS | HEART RATE: 79 BPM

## 2023-05-12 DIAGNOSIS — N18.1 STAGE 1 CHRONIC KIDNEY DISEASE: ICD-10-CM

## 2023-05-12 DIAGNOSIS — R97.20 ELEVATED PSA: ICD-10-CM

## 2023-05-12 DIAGNOSIS — Z00.00 MEDICARE ANNUAL WELLNESS VISIT, SUBSEQUENT: Primary | ICD-10-CM

## 2023-05-12 DIAGNOSIS — R25.2 MUSCLE CRAMPS: ICD-10-CM

## 2023-05-12 DIAGNOSIS — C61 PROSTATE CANCER (MULTI): ICD-10-CM

## 2023-05-12 DIAGNOSIS — E78.5 HYPERLIPIDEMIA, UNSPECIFIED HYPERLIPIDEMIA TYPE: ICD-10-CM

## 2023-05-12 DIAGNOSIS — I10 HYPERTENSION, UNSPECIFIED TYPE: ICD-10-CM

## 2023-05-12 DIAGNOSIS — F32.A DEPRESSION, UNSPECIFIED DEPRESSION TYPE: ICD-10-CM

## 2023-05-12 PROBLEM — R06.02 SOB (SHORTNESS OF BREATH): Status: RESOLVED | Noted: 2023-05-12 | Resolved: 2023-05-12

## 2023-05-12 PROBLEM — M21.6X2 EQUINUS DEFORMITY OF BOTH FEET: Status: ACTIVE | Noted: 2020-06-02

## 2023-05-12 PROBLEM — R39.9 LOWER URINARY TRACT SYMPTOMS: Status: RESOLVED | Noted: 2023-05-12 | Resolved: 2023-05-12

## 2023-05-12 PROBLEM — R53.83 FATIGUE: Status: ACTIVE | Noted: 2023-05-12

## 2023-05-12 PROBLEM — H65.90 OTITIS, SEROUS: Status: ACTIVE | Noted: 2023-05-12

## 2023-05-12 PROBLEM — R00.2 PALPITATIONS: Status: ACTIVE | Noted: 2023-05-12

## 2023-05-12 PROBLEM — M77.8 CAPSULITIS OF FOOT, RIGHT: Status: ACTIVE | Noted: 2020-06-02

## 2023-05-12 PROBLEM — M21.6X2 EQUINUS DEFORMITY OF BOTH FEET: Status: RESOLVED | Noted: 2020-06-02 | Resolved: 2023-05-12

## 2023-05-12 PROBLEM — R39.9 LOWER URINARY TRACT SYMPTOMS: Status: ACTIVE | Noted: 2023-05-12

## 2023-05-12 PROBLEM — N41.9 PROSTATITIS: Status: ACTIVE | Noted: 2023-05-12

## 2023-05-12 PROBLEM — K21.9 GASTROESOPHAGEAL REFLUX DISEASE: Status: ACTIVE | Noted: 2023-05-12

## 2023-05-12 PROBLEM — M77.41 METATARSALGIA OF RIGHT FOOT: Status: ACTIVE | Noted: 2020-06-02

## 2023-05-12 PROBLEM — G89.18 POST-OP PAIN: Status: ACTIVE | Noted: 2019-03-23

## 2023-05-12 PROBLEM — K21.9 GASTROESOPHAGEAL REFLUX DISEASE: Status: RESOLVED | Noted: 2023-05-12 | Resolved: 2023-05-12

## 2023-05-12 PROBLEM — R01.1 HEART MURMUR: Status: ACTIVE | Noted: 2023-05-12

## 2023-05-12 PROBLEM — H65.90 OTITIS, SEROUS: Status: RESOLVED | Noted: 2023-05-12 | Resolved: 2023-05-12

## 2023-05-12 PROBLEM — L90.9 FAT PAD ATROPHY OF FOOT: Status: ACTIVE | Noted: 2020-06-02

## 2023-05-12 PROBLEM — R06.02 SOB (SHORTNESS OF BREATH): Status: ACTIVE | Noted: 2023-05-12

## 2023-05-12 PROBLEM — M79.605 PAIN OF LEFT LOWER EXTREMITY: Status: ACTIVE | Noted: 2023-05-12

## 2023-05-12 PROBLEM — E34.9 HYPOTESTOSTERONISM: Status: ACTIVE | Noted: 2023-05-12

## 2023-05-12 PROBLEM — K21.9 GERD (GASTROESOPHAGEAL REFLUX DISEASE): Status: ACTIVE | Noted: 2023-05-12

## 2023-05-12 PROBLEM — E78.00 HIGH CHOLESTEROL: Status: RESOLVED | Noted: 2023-05-12 | Resolved: 2023-05-12

## 2023-05-12 PROBLEM — R93.1 ABNORMAL ECHOCARDIOGRAM: Status: ACTIVE | Noted: 2023-05-12

## 2023-05-12 PROBLEM — G57.60 MORTON'S NEUROMA: Status: ACTIVE | Noted: 2023-05-12

## 2023-05-12 PROBLEM — G89.18 POST-OP PAIN: Status: RESOLVED | Noted: 2019-03-23 | Resolved: 2023-05-12

## 2023-05-12 PROBLEM — M21.6X1 ACQUIRED ABDUCTION DEFORMITY OF RIGHT FOOT: Status: ACTIVE | Noted: 2019-03-19

## 2023-05-12 PROBLEM — I35.0 AORTIC VALVE STENOSIS: Status: ACTIVE | Noted: 2022-06-03

## 2023-05-12 PROBLEM — N41.9 PROSTATITIS: Status: RESOLVED | Noted: 2023-05-12 | Resolved: 2023-05-12

## 2023-05-12 PROBLEM — M21.6X1 EQUINUS DEFORMITY OF BOTH FEET: Status: ACTIVE | Noted: 2020-06-02

## 2023-05-12 PROBLEM — N43.40 SPERMATOCELE: Status: ACTIVE | Noted: 2023-05-12

## 2023-05-12 PROBLEM — M21.6X1 EQUINUS DEFORMITY OF BOTH FEET: Status: RESOLVED | Noted: 2020-06-02 | Resolved: 2023-05-12

## 2023-05-12 PROBLEM — M54.50 LOW BACK PAIN: Status: ACTIVE | Noted: 2023-05-12

## 2023-05-12 PROBLEM — E78.00 HIGH CHOLESTEROL: Status: ACTIVE | Noted: 2023-05-12

## 2023-05-12 PROBLEM — I25.10 ATHEROSCLEROTIC HEART DISEASE OF NATIVE CORONARY ARTERY WITHOUT ANGINA PECTORIS: Status: ACTIVE | Noted: 2023-05-12

## 2023-05-12 PROBLEM — R53.1 GENERALIZED WEAKNESS: Status: ACTIVE | Noted: 2023-05-12

## 2023-05-12 PROCEDURE — 3079F DIAST BP 80-89 MM HG: CPT | Performed by: FAMILY MEDICINE

## 2023-05-12 PROCEDURE — 1170F FXNL STATUS ASSESSED: CPT | Performed by: FAMILY MEDICINE

## 2023-05-12 PROCEDURE — 1036F TOBACCO NON-USER: CPT | Performed by: FAMILY MEDICINE

## 2023-05-12 PROCEDURE — 99213 OFFICE O/P EST LOW 20 MIN: CPT | Performed by: FAMILY MEDICINE

## 2023-05-12 PROCEDURE — G0439 PPPS, SUBSEQ VISIT: HCPCS | Performed by: FAMILY MEDICINE

## 2023-05-12 PROCEDURE — 1159F MED LIST DOCD IN RCRD: CPT | Performed by: FAMILY MEDICINE

## 2023-05-12 PROCEDURE — 3075F SYST BP GE 130 - 139MM HG: CPT | Performed by: FAMILY MEDICINE

## 2023-05-12 RX ORDER — ASPIRIN 81 MG/1
1 TABLET ORAL DAILY
COMMUNITY
Start: 2014-03-24

## 2023-05-12 RX ORDER — CALCIUM CARB, CITRATE, MALATE 250 MG
CAPSULE ORAL
COMMUNITY

## 2023-05-12 RX ORDER — ROSUVASTATIN CALCIUM 5 MG/1
5 TABLET, COATED ORAL EVERY OTHER DAY
COMMUNITY
Start: 2023-03-04

## 2023-05-12 RX ORDER — AMLODIPINE BESYLATE 2.5 MG/1
2.5 TABLET ORAL DAILY
COMMUNITY
End: 2023-07-24 | Stop reason: SDUPTHER

## 2023-05-12 ASSESSMENT — PATIENT HEALTH QUESTIONNAIRE - PHQ9
2. FEELING DOWN, DEPRESSED OR HOPELESS: SEVERAL DAYS
10. IF YOU CHECKED OFF ANY PROBLEMS, HOW DIFFICULT HAVE THESE PROBLEMS MADE IT FOR YOU TO DO YOUR WORK, TAKE CARE OF THINGS AT HOME, OR GET ALONG WITH OTHER PEOPLE: NOT DIFFICULT AT ALL
1. LITTLE INTEREST OR PLEASURE IN DOING THINGS: SEVERAL DAYS
SUM OF ALL RESPONSES TO PHQ9 QUESTIONS 1 AND 2: 2

## 2023-05-12 ASSESSMENT — ACTIVITIES OF DAILY LIVING (ADL)
GROCERY_SHOPPING: INDEPENDENT
MANAGING_FINANCES: INDEPENDENT
TAKING_MEDICATION: INDEPENDENT
BATHING: INDEPENDENT
DOING_HOUSEWORK: INDEPENDENT
DRESSING: INDEPENDENT

## 2023-05-12 NOTE — PROGRESS NOTES
Subjective   Patient ID: Julio Miller is a 72 y.o. male who presents for No chief complaint on file..  HPI    ROS  Constitutional- No activity change. No appetite change.  Eyes- Denies vision changes.  Respiratory- No shortness of breath.  Cardiovascular- No palpitations. No chest pain.  GI- No nausea or vomiting. No diarrhea or constipation. Denies abdominal pain.  Musculoskeletal- Denies joint swelling.  Extremities- No edema.  Neurological- Denies headaches. Denies dizziness.  Skin- No rashes.  Psychiatric/Behavioral- Denies significant anxiety, or depressed mood.     Objective     There were no vitals taken for this visit.    Allergies   Allergen Reactions    Cat Dander Itching    Cefuroxime Axetil Diarrhea    House Dust Itching    Testosterone Other and Rash     Dermatitis with the patch form       Constitutional-- Well-nourished.  No distress  Head- unremarkable.  Ears- TMs clear.  Eyes- PERRL.  Conjunctiva normal.  Nose- Normal.  No rhinorrhea noted.  Throat- Oropharynx is clear and moist.  Neck- Supple with no thyromegaly.  No significant cervical adenopathy noted.  Pulmonary/Chest- Breath sounds normal with normal effort.  No wheezing.  Heart- Regular rate and rhythm.  No murmur.  Abdomen- Soft and non-tender.  No masses noted.  Musculoskeletal- Normal ROM.  No significant joint swelling  Extremities- No edema.   Neurological- Alert.  No noted deficits.  Skin- Warm.  No rashes.  Psychiatric/Behavioral- Mood and affect normal.  Behavior normal.     Assessment/Plan   No diagnosis found.       Long talk. Treatment options reviewed.    Continue and take your medications as prescribed.    Health Maintenance issues discussed.    Importance of healthy diet and regular exercise regimen discussed.    We will contact you with any test results ordered. If you do not hear from us, please contact.    Follow-up as instructed or sooner if any problems or symptoms do not resolve as expected.

## 2023-05-12 NOTE — PROGRESS NOTES
"Subjective   Reason for Visit: Julio Miller is an 72 y.o. male here for a Medicare Wellness visit.     Past Medical, Surgical, and Family History reviewed and updated in chart.    Reviewed all medications by prescribing practitioner or clinical pharmacist (such as prescriptions, OTCs, herbal therapies and supplements) and documented in the medical record.    HPI  Patient presents today for a follow-up on Blood Work done on 5-10-23.    States he has chronic kidney disease. Urinates a lot, has muscle cramps. Wondering if he should have a UA, or discuss this with his Urologist?  He has started increasing his potassium, and this has helped the muscle cramps at night. He is eating more vegetables. The urinary frequency, and muscle cramps are chronic issues.   Dr. Caba- Urologist    States Equinus deformity of both feet is located on his problem list. He states he has never had this issue, and doesn't know how it got on his chart.    States his night time acid reflux is gone.      Taking current medications which were reviewed.  Problem list discussed.    Overall doing well.  Eating okay.  Staying active.    Has no other new problem /question.    Patient Care Team:  Rufus Hudson MD as PCP - General  Rufus Hudson MD as PCP - [facility] Attributed Provider     Review of Systems  Constitutional- No activity change. No appetite change.  Eyes- Denies vision changes.  Respiratory- No shortness of breath.  Cardiovascular- No palpitations. No chest pain.  GI- No nausea or vomiting. No diarrhea or constipation. Denies abdominal pain.  Musculoskeletal- Denies joint swelling.  Extremities- No edema.  Neurological- Denies headaches. Denies dizziness.  Skin- No rashes.  Psychiatric/Behavioral- Denies significant anxiety, or depressed mood.  Objective   Vitals:  /80   Pulse 79   Temp 37 °C (98.6 °F)   Resp 12   Ht 1.702 m (5' 7\")   Wt 84.3 kg (185 lb 12.8 oz)   SpO2 97%   BMI 29.10 kg/m²       Physical " Exam    Constitutional-- Well-nourished.  No distress  Head- unremarkable.  Ears- TMs clear.  Eyes- PERRL.  Conjunctiva normal.  Nose- Normal.  No rhinorrhea noted.  Throat- Oropharynx is clear and moist.  Neck- Supple with no thyromegaly.  No significant cervical adenopathy noted.  Pulmonary/Chest- Breath sounds normal with normal effort.  No wheezing.  Heart- Regular rate and rhythm.  No murmur.  Abdomen- Soft and non-tender.  No masses noted.  Extremities- no edema.  Orthopedic exam significant for OA changes in the hands and knees  Mood normal      Assessment/Plan   Problem List Items Addressed This Visit          Circulatory    Hypertension       Genitourinary    Elevated PSA    Prostate cancer (CMS/[facility])    Stage 1 chronic kidney disease       Musculoskeletal    Muscle cramps       Other    Depression    Hyperlipidemia     Other Visit Diagnoses       Medicare annual wellness visit, subsequent    -  Primary               Long talk. Treatment options reviewed.  Mood stable  Hypertension controlled  Cholesterol controlled     Medicare wellness questionnaire reviewed in detail.   No problems with activities of daily living. Home safety issues reviewed.   Reminded to have an updated will if needed.    Refilled medications as requested/required   Continue and take your medications as prescribed.    Health Maintenance issues discussed.    Importance of healthy diet and regular exercise regimen discussed.    We will contact you with any test results ordered. If you do not hear from us, please contact.    Follow-up as instructed or sooner if any problems or symptoms do not resolve as expected.    Scribe Attestation  By signing my name below, IMckinley Scribe   attest that this documentation has been prepared under the direction and in the presence of Rufus Hudson MD.

## 2023-05-17 ENCOUNTER — TELEPHONE (OUTPATIENT)
Dept: PRIMARY CARE | Facility: CLINIC | Age: 73
End: 2023-05-17
Payer: MEDICARE

## 2023-05-17 DIAGNOSIS — N30.00 ACUTE CYSTITIS WITHOUT HEMATURIA: Primary | ICD-10-CM

## 2023-05-17 RX ORDER — CIPROFLOXACIN 250 MG/1
250 TABLET, FILM COATED ORAL 2 TIMES DAILY
Qty: 14 TABLET | Refills: 0 | Status: SHIPPED | OUTPATIENT
Start: 2023-05-17 | End: 2023-05-18 | Stop reason: SDUPTHER

## 2023-05-17 NOTE — TELEPHONE ENCOUNTER
PATIENT WAS HERE TO SEE YOU FOR A WELL CHECK BUT NOW HAS DEVELOPED A UTI.  HE SAID HE HAS HAD THEM BEFORE SO HE DOES KNOW WHAT THEY FEEL LIKE.  HE WANTED TO KNOW IF HE CAN HAVE AN ORDER FOR URINALYSIS OR AN ANTIBIOTIC ?    PREFERRED PHARMACY IS [facility] [address]  PLEASE ADVISE

## 2023-05-18 ENCOUNTER — TELEPHONE (OUTPATIENT)
Dept: PRIMARY CARE | Facility: CLINIC | Age: 73
End: 2023-05-18
Payer: MEDICARE

## 2023-05-18 DIAGNOSIS — N30.00 ACUTE CYSTITIS WITHOUT HEMATURIA: ICD-10-CM

## 2023-05-18 RX ORDER — CIPROFLOXACIN 250 MG/1
250 TABLET, FILM COATED ORAL 2 TIMES DAILY
Qty: 14 TABLET | Refills: 0 | Status: SHIPPED | OUTPATIENT
Start: 2023-05-18 | End: 2023-05-25

## 2023-05-18 NOTE — TELEPHONE ENCOUNTER
PATIENT THREW HIS CIPRO MEDICATION AWAY ON ACCIDENT.    HE WAS THROWING AWAY  MEDICATION AND DISCARDED IT BY ACCIDENT.  ARE YOU ABLE TO SEND IN ANOTHER SCRIPT?    ciprofloxacin (Cipro) 250 mg tablet [[id number]]    Order Details  Dose: 250 mg Route: oral Frequency: 2 times daily   Dispense Quantity: 14 tablet Refills: 0          Sig: Take 1 tablet (250 mg) by mouth 2 times a day for 7 days.     PHARMACY [id number] - [place], [place] - [address]  [address] [facility] [address]  Phone: [phone]  Fax: [phone]

## 2023-06-06 LAB — PROSTATE SPECIFIC AG (NG/ML) IN SER/PLAS: 0.62 NG/ML (ref 0–4)

## 2023-07-21 DIAGNOSIS — I10 HYPERTENSION, UNSPECIFIED TYPE: ICD-10-CM

## 2023-07-21 NOTE — TELEPHONE ENCOUNTER
Rx Refill Request Telephone Encounter    Name:  Julio Miller  : 1950     Medication Name:  Amlodipine  Dose (Optional):    2.5mg  Quantity (Optional):      Directions (Optional):   Take 1 tablet (2.5mg) by mouth once daily    ALLERGIES:   see list     Specific Pharmacy location:  [facility]    Date of last appointment:  23  Date of next appointment:  none    Best number to reach patient:  [phone]     The patient is a 69y Male complaining of lower leg pain/injury. Additional Notes: Discussed trimming instead of shaving when possible. Return to clinic with any similar lesions. Render Risk Assessment In Note?: no Detail Level: Simple

## 2023-07-24 RX ORDER — AMLODIPINE BESYLATE 2.5 MG/1
2.5 TABLET ORAL DAILY
Qty: 90 TABLET | Refills: 1 | Status: SHIPPED | OUTPATIENT
Start: 2023-07-24 | End: 2024-01-23 | Stop reason: SDUPTHER

## 2023-07-25 ENCOUNTER — TELEPHONE (OUTPATIENT)
Dept: PRIMARY CARE | Facility: CLINIC | Age: 73
End: 2023-07-25
Payer: MEDICARE

## 2023-07-25 NOTE — TELEPHONE ENCOUNTER
----- Message from Julio Miller sent at 7/24/2023 10:03 PM EDT -----  Regarding: Amlodipine Besylate  Contact: [phone]  Please renew my prescription. Thanks!  [place], [place]

## 2023-10-11 ENCOUNTER — TELEPHONE (OUTPATIENT)
Dept: PRIMARY CARE | Facility: CLINIC | Age: 73
End: 2023-10-11
Payer: MEDICARE

## 2023-10-11 ENCOUNTER — LAB (OUTPATIENT)
Dept: LAB | Facility: LAB | Age: 73
End: 2023-10-11
Payer: MEDICARE

## 2023-10-11 DIAGNOSIS — Z77.011 LEAD EXPOSURE: Primary | ICD-10-CM

## 2023-10-11 DIAGNOSIS — Z77.011 LEAD EXPOSURE: ICD-10-CM

## 2023-10-11 PROCEDURE — 83655 ASSAY OF LEAD: CPT

## 2023-10-11 PROCEDURE — 36415 COLL VENOUS BLD VENIPUNCTURE: CPT

## 2023-10-11 NOTE — TELEPHONE ENCOUNTER
Patient calling, he is concerned about being exposed to lead and is wanting to know if you can place an order for him to have labs checked for lead. Please advise.  
Pt aware  
1.2

## 2023-10-12 LAB — LEAD BLD-MCNC: 0.8 UG/DL

## 2023-12-05 ENCOUNTER — LAB (OUTPATIENT)
Dept: LAB | Facility: LAB | Age: 73
End: 2023-12-05
Payer: MEDICARE

## 2023-12-05 DIAGNOSIS — C61 MALIGNANT NEOPLASM OF PROSTATE (MULTI): Primary | ICD-10-CM

## 2023-12-05 DIAGNOSIS — C61 MALIGNANT NEOPLASM OF PROSTATE (MULTI): ICD-10-CM

## 2023-12-05 LAB — PSA SERPL-MCNC: 0.19 NG/ML

## 2023-12-05 PROCEDURE — 84153 ASSAY OF PSA TOTAL: CPT

## 2023-12-05 PROCEDURE — 36415 COLL VENOUS BLD VENIPUNCTURE: CPT

## 2023-12-06 ENCOUNTER — TELEPHONE (OUTPATIENT)
Dept: RADIATION ONCOLOGY | Facility: HOSPITAL | Age: 73
End: 2023-12-06
Payer: MEDICARE

## 2023-12-06 NOTE — PROGRESS NOTES
Cancer synopsis:  Rad/onc: Dr. Bravo/ [facility]  Urology: Dr. Caba     73 [id number] favorable intermediate risk prostate cancer, PSA 9.2, [place] 3+4=7 in 3/19 cores, 40-80%, from pirads5 PSMA PET+ anterior apex lesion, possible  EPE anteriorly on MRI. 48cc. PVR 25cc      PRIOR TREATMENT: SBRT utilizing 10 MV photons, VMAT FFF technique, 40Gy in 5 fractions of 8Gy each.  Treatment completed on 9/8/2022.  No androgen ablation used.  SpaceOAR gel and fiducials placed prior to treatment.    History of presenting illness:    Patient ID: [id number]     Julio Miller is a 73 y.o. male who presents for his FIR prostate 3+4=7 IPSA <10 s/p RT.    RT Site: Prostate  RT Date: 09/08/2022  Hormone therapy: No  Hot Flushes: Denies  Fatigue: Denies  Bone pain: Denies  CLIVE: n/a virtual  ED: Does report some erectile changes since RT.  ED medications: No  IPSS: n/a virtual  Urinary symptoms: Admits to reduced stream without flowmax. Admits urgency daily.   Urinary Medications: Yes, flowmax daily  Rectal bleeding: Denies  Other systems: Denies SOB, CP or fever.      Review of systems:  Review of Systems   Constitutional:  Negative for fatigue, fever and unexpected weight change.   Respiratory:  Negative for cough, chest tightness and shortness of breath.    Cardiovascular:  Negative for chest pain, palpitations and leg swelling.   Gastrointestinal:  Negative for abdominal pain, anal bleeding, blood in stool, constipation, diarrhea and rectal pain.   Endocrine: Negative for cold intolerance, heat intolerance and polyuria.   Genitourinary:  Negative for decreased urine volume, difficulty urinating, dysuria, frequency, hematuria and urgency.   Musculoskeletal:  Negative for arthralgias, back pain, gait problem and joint swelling.   Skin: Negative.    Allergic/Immunologic: Negative.    Neurological:  Negative for dizziness, syncope and weakness.   Psychiatric/Behavioral: Negative.         Past Medical history  Past Medical History:    Diagnosis Date    Encounter for general adult medical examination without abnormal findings 03/01/2022    Medicare annual wellness visit, subsequent    Encounter for screening for malignant neoplasm of colon     Screen for colon cancer    Encounter for screening for malignant neoplasm of colon 03/18/2022    Colon cancer screening    Encounter for screening for malignant neoplasm of prostate 10/21/2015    Screening for prostate cancer    Other conditions influencing health status 02/17/2021    History of cough    Palpitations 08/18/2013    Palpitations    Personal history of diseases of the skin and subcutaneous tissue 01/15/2016    History of contact dermatitis    Personal history of other diseases of the respiratory system 02/19/2021    History of nasal discharge    Tinea cruris     Jock itch        Surgical/family history  Family History   Problem Relation Name Age of Onset    Migraines Mother      Other (Heart palpitations) Mother      Alzheimer's disease Father      Prostatitis Father      Parkinsonism Father        Past Surgical History:   Procedure Laterality Date    OTHER SURGICAL HISTORY  04/15/2022    Foot surgery    OTHER SURGICAL HISTORY  04/15/2022    Vasectomy    OTHER SURGICAL HISTORY  04/15/2022    Excision of spermatocele    OTHER SURGICAL HISTORY  04/15/2022    Hemorrhoidectomy simple        Social History  Tobacco Use: Low Risk  (5/12/2023)    Patient History     Smoking Tobacco Use: Never     Smokeless Tobacco Use: Never     Passive Exposure: Not on file         Current med list:  Current Outpatient Medications   Medication Instructions    amLODIPine (NORVASC) 2.5 mg, oral, Daily    aspirin 81 mg EC tablet 1 tablet, oral, Daily    CALCIUM-MAGNESIUM-ZINC ORAL oral    potassium citrate 99 mg capsule oral    rosuvastatin (CRESTOR) 5 mg, oral, Every other day        Last recorded vital:  N/a Virtual    Physical exam  N/a virtual    Pertinent labs:  Prostate Specific AG   Date/Time Value Ref Range  Status   12/05/2023 12:47 PM 0.19 <=4.00 ng/mL Final         Dx:  Problem List Items Addressed This Visit    None  Visit Diagnoses       Malignant neoplasm of prostate (CMS/HCC)    -  Primary         PSA of 0.19 was reviewed and is declining. Review of latent SE including rectal bleeding, hematuria, urinary strictures, ED where reviewed as well as how to contact office if s/s present. Does report ED not concerned at this time however and will monitor. Denies latent SE. NCCN guidelines where reviewed and routine FUV of every 3m for first year and every 6m for four years for a total of five years was discussed. Patient verbalized understanding.          PLAN:  FUV 6m virtual  Labs Psa in 6m  Imaging none  Flowmax daily  FUV other providers: PCP for routine FUV, Urology PRN        Please contact office with any concerns:  Romero Scruggs CNP  [phone]

## 2023-12-07 ENCOUNTER — HOSPITAL ENCOUNTER (OUTPATIENT)
Dept: RADIATION ONCOLOGY | Facility: HOSPITAL | Age: 73
Setting detail: RADIATION/ONCOLOGY SERIES
Discharge: HOME | End: 2023-12-07
Payer: MEDICARE

## 2023-12-07 DIAGNOSIS — C61 MALIGNANT NEOPLASM OF PROSTATE (MULTI): Primary | ICD-10-CM

## 2023-12-07 PROCEDURE — 99213 OFFICE O/P EST LOW 20 MIN: CPT

## 2023-12-07 ASSESSMENT — ENCOUNTER SYMPTOMS
BACK PAIN: 0
ABDOMINAL PAIN: 0
ARTHRALGIAS: 0
PSYCHIATRIC NEGATIVE: 1
ALLERGIC/IMMUNOLOGIC NEGATIVE: 1
DYSURIA: 0
BLOOD IN STOOL: 0
RECTAL PAIN: 0
CONSTIPATION: 0
DIFFICULTY URINATING: 0
DIZZINESS: 0
CHEST TIGHTNESS: 0
UNEXPECTED WEIGHT CHANGE: 0
COUGH: 0
ANAL BLEEDING: 0
FREQUENCY: 0
JOINT SWELLING: 0
WEAKNESS: 0
FEVER: 0
DIARRHEA: 0
HEMATURIA: 0
SHORTNESS OF BREATH: 0
PALPITATIONS: 0
FATIGUE: 0

## 2023-12-08 ENCOUNTER — APPOINTMENT (OUTPATIENT)
Dept: RADIATION ONCOLOGY | Facility: HOSPITAL | Age: 73
End: 2023-12-08
Payer: MEDICARE

## 2023-12-14 ENCOUNTER — OFFICE VISIT (OUTPATIENT)
Dept: UROLOGY | Facility: CLINIC | Age: 73
End: 2023-12-14
Payer: MEDICARE

## 2023-12-14 VITALS
DIASTOLIC BLOOD PRESSURE: 77 MMHG | SYSTOLIC BLOOD PRESSURE: 154 MMHG | HEART RATE: 71 BPM | BODY MASS INDEX: 30.29 KG/M2 | WEIGHT: 193 LBS | HEIGHT: 67 IN

## 2023-12-14 DIAGNOSIS — N32.81 OVERACTIVE BLADDER: ICD-10-CM

## 2023-12-14 DIAGNOSIS — C61 PROSTATE CANCER (MULTI): Primary | ICD-10-CM

## 2023-12-14 PROBLEM — R97.20 ELEVATED PSA: Status: RESOLVED | Noted: 2023-05-12 | Resolved: 2023-12-14

## 2023-12-14 PROCEDURE — 99214 OFFICE O/P EST MOD 30 MIN: CPT | Performed by: UROLOGY

## 2023-12-14 PROCEDURE — 3077F SYST BP >= 140 MM HG: CPT | Performed by: UROLOGY

## 2023-12-14 PROCEDURE — 1160F RVW MEDS BY RX/DR IN RCRD: CPT | Performed by: UROLOGY

## 2023-12-14 PROCEDURE — 1159F MED LIST DOCD IN RCRD: CPT | Performed by: UROLOGY

## 2023-12-14 PROCEDURE — 1036F TOBACCO NON-USER: CPT | Performed by: UROLOGY

## 2023-12-14 PROCEDURE — 3078F DIAST BP <80 MM HG: CPT | Performed by: UROLOGY

## 2023-12-14 PROCEDURE — 1126F AMNT PAIN NOTED NONE PRSNT: CPT | Performed by: UROLOGY

## 2023-12-14 NOTE — PROGRESS NOTES
PRIOR NOTES  73-year-old male seeing me for prostate cancer, spermatocele, LUTS, and hypogonadism  Kindly referred by Dr. Johnsonami Gaspary, his wife, is present with him  PMH: Hypertension, depression, GERD, hyperlipidemia, hypogonadism, prior spermatocelectomy  On aspirin only  Spermatocele recurrent not bothersome  Testosterone 10/2015 - 201  Not actively taking TRT - previously tried patch but had adhesion allergy  Elevated PSA in Feb, took cipro from Feb - March, repeat still high  FHx PCa: none  PSA history:  03/22: 9.20  02/22: 8.80  11/19: 5.67  Prior biopsy: no  Additional tests done: none  Endorses weak stream, intermittency, double voiding, urgency  Denies straining, hematuria, incomplete emptying, hesitancy  NTF 1x but up to 3x (affected by caffeine and chocolate)  PVR 25cc  - Erections poor at baseline, ~50%     PROSTATE CA HX  - 05/2022 - MRI-P - 48.2g gland, PSAD 0.19, PIRADs 5 lesion anterior fibromuscular stroma w/ capsular irregularity extending to b/l t-zone and p-zone  - 06/28/2022 - INITIAL DX - OR 6/28 - 16 core template + targeted   - Path - GG2 adenoca located LA, anterior target in 3/4 fragments, 70% of tissue max (5% G4), no crib or IDC   - Intermediate risk favorable - cT1c, GG2, 3/19 cores, 5% pattern 4, PSA 9.2, MRI suggests EPE however not conclusive  - 7/8/22 - bone scan - two foci of activity in sternum and lower spine, possibly DJD  - 07/21/2022 - PSMA PET - no evidence metastatic disease  - 9/8/22 - completed SBRT, 40 Gy in 5 fractions, no concurrent ADT  - 12/2022 - PSA 0.90  - 06/2023 - PSA 0.62  - 12/5/23 - PSA 0.19     5/15/23 - clinical symptoms of UTI, took abx (no culture). Took some tamsulosin which helped.      FUV 6/8/23 - Pt reports doing well. Decent stream, occasional weak stream and straining. NTF 3-4x. He has since reduced fluids and electrolytes. With change in lifestyle he is down to 1-2x.       UPDATED SUBJECTIVE HISTORY  12/14/23 - Reports voiding well with tamsulosin.  At baseline no dysuria, hesitancy, incomplete emptying. Very mild urgency.  He notices he has sensitivity to spicy foods, acidic foods, carbonated beverages. Not getting erections, but not a priority.   NTF 1-2x    Past Medical History  He has a past medical history of Encounter for general adult medical examination without abnormal findings (03/01/2022), Encounter for screening for malignant neoplasm of colon, Encounter for screening for malignant neoplasm of colon (03/18/2022), Encounter for screening for malignant neoplasm of prostate (10/21/2015), Other conditions influencing health status (02/17/2021), Palpitations (08/18/2013), Personal history of diseases of the skin and subcutaneous tissue (01/15/2016), Personal history of other diseases of the respiratory system (02/19/2021), and Tinea cruris.    Surgical History  He has a past surgical history that includes Other surgical history (04/15/2022); Other surgical history (04/15/2022); Other surgical history (04/15/2022); and Other surgical history (04/15/2022).     Social History  He reports that he has never smoked. He has never used smokeless tobacco. No history on file for alcohol use and drug use.    Family History  Family History   Problem Relation Name Age of Onset    Migraines Mother      Other (Heart palpitations) Mother      Alzheimer's disease Father      Prostatitis Father      Parkinsonism Father          Allergies  Cat dander, Cefuroxime axetil, House dust, Mold, and Testosterone    ROS: 12 system review was completed and is negative with the exception of those signs and symptoms noted in the history of present illness: A 12 system review was completed and is negative with the exception of those signs and symptoms noted in the history of present illness.     Exam:  General: in NAD, appears stated age  Head: normocephalic, atraumatic  Respiratory: normal effort, no use of accessory muscles  Cardiovascular: no edema noted  Skin: normal turgor, no  "rashes  Neurologic: grossly intact, oriented to person/place/time  Psychiatric: mode and affect appropriate     Last Recorded Vitals  Blood pressure 154/77, pulse 71, height 1.702 m (5' 7\"), weight 87.5 kg (193 lb).    Lab Results   Component Value Date    PSASCREEN 8.80 (H) 02/24/2022    CREATININE 1.21 05/10/2023    HGB 13.9 05/10/2023         ASSESSMENT/PLAN:  # Lower urinary tract symptoms, overactive bladder  -Continue tamsulosin  -We discussed tadalafil, he has bothersome reflux and therefore did not want to try tadalafil for now  -I briefly mention oxybutynin and Myrbetriq but we would hold off further discussions now as his quality of life is quite good    # Prostate cancer  -Excellent response thus far to XRT    Fuentes Caba MD    "

## 2024-01-23 DIAGNOSIS — I10 HYPERTENSION, UNSPECIFIED TYPE: ICD-10-CM

## 2024-01-23 RX ORDER — AMLODIPINE BESYLATE 2.5 MG/1
2.5 TABLET ORAL DAILY
Qty: 90 TABLET | Refills: 0 | Status: SHIPPED | OUTPATIENT
Start: 2024-01-23 | End: 2024-04-24 | Stop reason: SDUPTHER

## 2024-01-23 NOTE — TELEPHONE ENCOUNTER
PATIENT NEEDS 1 REFILL TILL APPT ON 24 WITH DR [facility] AND CANNOT COME IN TILL THEN    Rx Refill Request Telephone Encounter    Name:  Julio MCCANN Paul  : 1950     Medication Name:  AMLODIPINE   Dose (Optional):    2.5 MG   Quantity (Optional):    90  Directions (Optional):   TAKE 1 TABLET BY MOUTH ONCE DAILY    ALLERGIES:   ON FILE     Specific Pharmacy location:  [facility]     Date of last appointment:    Date of next appointment:  24    Best number to reach patient:  [phone]

## 2024-02-27 ENCOUNTER — OFFICE VISIT (OUTPATIENT)
Dept: PRIMARY CARE | Facility: CLINIC | Age: 74
End: 2024-02-27
Payer: MEDICARE

## 2024-02-27 VITALS
OXYGEN SATURATION: 97 % | HEIGHT: 67 IN | DIASTOLIC BLOOD PRESSURE: 82 MMHG | WEIGHT: 198.2 LBS | BODY MASS INDEX: 31.11 KG/M2 | SYSTOLIC BLOOD PRESSURE: 130 MMHG | RESPIRATION RATE: 18 BRPM | HEART RATE: 86 BPM

## 2024-02-27 DIAGNOSIS — F32.0 MILD MAJOR DEPRESSION (CMS-HCC): ICD-10-CM

## 2024-02-27 DIAGNOSIS — R73.9 ELEVATED BLOOD SUGAR: Primary | ICD-10-CM

## 2024-02-27 DIAGNOSIS — Z12.5 PROSTATE CANCER SCREENING: ICD-10-CM

## 2024-02-27 DIAGNOSIS — C61 PROSTATE CANCER (MULTI): ICD-10-CM

## 2024-02-27 DIAGNOSIS — E78.5 HYPERLIPIDEMIA, UNSPECIFIED HYPERLIPIDEMIA TYPE: ICD-10-CM

## 2024-02-27 DIAGNOSIS — K21.9 GASTROESOPHAGEAL REFLUX DISEASE WITHOUT ESOPHAGITIS: ICD-10-CM

## 2024-02-27 DIAGNOSIS — E78.00 HYPERCHOLESTEROLEMIA: ICD-10-CM

## 2024-02-27 DIAGNOSIS — E55.9 VITAMIN D DEFICIENCY: ICD-10-CM

## 2024-02-27 DIAGNOSIS — I10 HYPERTENSION, UNSPECIFIED TYPE: ICD-10-CM

## 2024-02-27 PROCEDURE — 99214 OFFICE O/P EST MOD 30 MIN: CPT | Performed by: FAMILY MEDICINE

## 2024-02-27 PROCEDURE — 1126F AMNT PAIN NOTED NONE PRSNT: CPT | Performed by: FAMILY MEDICINE

## 2024-02-27 PROCEDURE — 3079F DIAST BP 80-89 MM HG: CPT | Performed by: FAMILY MEDICINE

## 2024-02-27 PROCEDURE — 1159F MED LIST DOCD IN RCRD: CPT | Performed by: FAMILY MEDICINE

## 2024-02-27 PROCEDURE — 1157F ADVNC CARE PLAN IN RCRD: CPT | Performed by: FAMILY MEDICINE

## 2024-02-27 PROCEDURE — 3075F SYST BP GE 130 - 139MM HG: CPT | Performed by: FAMILY MEDICINE

## 2024-02-27 PROCEDURE — 1036F TOBACCO NON-USER: CPT | Performed by: FAMILY MEDICINE

## 2024-02-27 PROCEDURE — 1160F RVW MEDS BY RX/DR IN RCRD: CPT | Performed by: FAMILY MEDICINE

## 2024-02-27 NOTE — PROGRESS NOTES
"Subjective   Patient ID: Julio Miller is a 73 y.o. male who presents for Hypertension and Hyperlipidemia.  HPI    HTN follow up  Denies chest pain,SOB, swelling, headaches, lightheadedness or dizziness.   Patient eats a generally healthy diet  Does not check BP at home  Currently taking amlodipine 2.5 mg and rosuvastatin 5 mg    Taking current medications which were reviewed.  Problem list discussed.    Overall doing well.  Eating okay.  Staying active.    Has no other new problem /question.     ROS  Constitutional- No activity change. No appetite change.  Eyes- Denies vision changes.  Respiratory- No shortness of breath.  Cardiovascular- No palpitations. No chest pain.  GI- No nausea or vomiting. No diarrhea or constipation. Denies abdominal pain.  Musculoskeletal- Denies joint swelling.  Extremities- No edema.  Neurological- Denies headaches. Denies dizziness.  Skin- No rashes.  Psychiatric/Behavioral- Denies significant anxiety, or depressed mood.     Objective     /82   Pulse 86   Resp 18   Ht 1.702 m (5' 7\")   Wt 89.9 kg (198 lb 3.2 oz)   SpO2 97%   BMI 31.04 kg/m²     Allergies   Allergen Reactions    Cat Dander Itching    Cefuroxime Axetil Diarrhea    House Dust Itching    Mold Unknown    Testosterone Other and Rash     Dermatitis with the patch form       Constitutional-- Well-nourished.  No distress  Head- unremarkable.  Eyes- PERRL.  Conjunctiva normal.  Nose- Normal.  No rhinorrhea noted.  Throat- Oropharynx is clear and moist.  Neck- Supple with no thyromegaly.  No significant cervical adenopathy noted.  Pulmonary/Chest- Breath sounds normal with normal effort.  No wheezing.  Heart- Regular rate and rhythm.  No murmur.  Abdomen- Soft and non-tender.  No masses noted.  Musculoskeletal-OA changes hands noted  Extremities- No edema.   Neurological- Alert.  No noted deficits.  Skin- Warm.  No rashes.  Psychiatric/Behavioral- Mood and affect normal.  Behavior normal.     Assessment/Plan   1. " Elevated blood sugar  Hemoglobin A1C      2. Hypertension, unspecified type  CBC and Auto Differential    Comprehensive metabolic panel      3. Hypercholesterolemia  Lipid panel      4. Hyperlipidemia, unspecified hyperlipidemia type  CBC and Auto Differential    Comprehensive metabolic panel      5. Prostate cancer screening        6. Vitamin D deficiency  Vitamin D 25-Hydroxy,Total (for eval of Vitamin D levels)      7. Mild major depression (CMS/HCC)        8. Prostate cancer (CMS/HCC)        9. Gastroesophageal reflux disease without esophagitis               Long talk. Treatment options reviewed.    Reviewed most recent lab work with patient. Advised patient to remain up to date on routine maintenance and health screening.      Educated on vitamin deficiencies.     Hypertension controlled.  Will continue to monitor.   Prostate cancer in remission.  GERD stable.    Complete blood work as discussed.      Continue and take your medications as prescribed.    Health Maintenance issues discussed.    Importance of healthy diet and regular exercise regimen discussed.    We will contact you with any test results ordered. If you do not hear from us, please contact.    Follow-up as instructed or sooner if any problems or symptoms do not resolve as expected.        Scribe Attestation  By signing my name below, IIlda Scribe   attest that this documentation has been prepared under the direction and in the presence of Rufus Hudson MD.

## 2024-03-04 PROBLEM — F32.0 MILD MAJOR DEPRESSION (CMS-HCC): Status: ACTIVE | Noted: 2024-03-04

## 2024-03-22 ENCOUNTER — LAB (OUTPATIENT)
Dept: LAB | Facility: LAB | Age: 74
End: 2024-03-22
Payer: MEDICARE

## 2024-03-22 DIAGNOSIS — I10 HYPERTENSION, UNSPECIFIED TYPE: ICD-10-CM

## 2024-03-22 DIAGNOSIS — R73.9 ELEVATED BLOOD SUGAR: ICD-10-CM

## 2024-03-22 DIAGNOSIS — E78.5 HYPERLIPIDEMIA, UNSPECIFIED HYPERLIPIDEMIA TYPE: ICD-10-CM

## 2024-03-22 DIAGNOSIS — E55.9 VITAMIN D DEFICIENCY: ICD-10-CM

## 2024-03-22 DIAGNOSIS — E78.00 HYPERCHOLESTEROLEMIA: ICD-10-CM

## 2024-03-22 LAB
25(OH)D3 SERPL-MCNC: 23 NG/ML (ref 30–100)
ALBUMIN SERPL BCP-MCNC: 4.6 G/DL (ref 3.4–5)
ALP SERPL-CCNC: 65 U/L (ref 33–136)
ALT SERPL W P-5'-P-CCNC: 15 U/L (ref 10–52)
ANION GAP SERPL CALC-SCNC: 13 MMOL/L (ref 10–20)
AST SERPL W P-5'-P-CCNC: 20 U/L (ref 9–39)
BASOPHILS # BLD AUTO: 0.03 X10*3/UL (ref 0–0.1)
BASOPHILS NFR BLD AUTO: 0.7 %
BILIRUB SERPL-MCNC: 0.8 MG/DL (ref 0–1.2)
BUN SERPL-MCNC: 17 MG/DL (ref 6–23)
CALCIUM SERPL-MCNC: 9.9 MG/DL (ref 8.6–10.6)
CHLORIDE SERPL-SCNC: 102 MMOL/L (ref 98–107)
CHOLEST SERPL-MCNC: 197 MG/DL (ref 0–199)
CHOLESTEROL/HDL RATIO: 4.3
CO2 SERPL-SCNC: 30 MMOL/L (ref 21–32)
CREAT SERPL-MCNC: 1.32 MG/DL (ref 0.5–1.3)
EGFRCR SERPLBLD CKD-EPI 2021: 57 ML/MIN/1.73M*2
EOSINOPHIL # BLD AUTO: 0.35 X10*3/UL (ref 0–0.4)
EOSINOPHIL NFR BLD AUTO: 7.9 %
ERYTHROCYTE [DISTWIDTH] IN BLOOD BY AUTOMATED COUNT: 12.3 % (ref 11.5–14.5)
EST. AVERAGE GLUCOSE BLD GHB EST-MCNC: 108 MG/DL
GLUCOSE SERPL-MCNC: 106 MG/DL (ref 74–99)
HBA1C MFR BLD: 5.4 %
HCT VFR BLD AUTO: 42.5 % (ref 41–52)
HDLC SERPL-MCNC: 45.3 MG/DL
HGB BLD-MCNC: 14.3 G/DL (ref 13.5–17.5)
IMM GRANULOCYTES # BLD AUTO: 0.01 X10*3/UL (ref 0–0.5)
IMM GRANULOCYTES NFR BLD AUTO: 0.2 % (ref 0–0.9)
LDLC SERPL CALC-MCNC: 124 MG/DL
LYMPHOCYTES # BLD AUTO: 1.32 X10*3/UL (ref 0.8–3)
LYMPHOCYTES NFR BLD AUTO: 29.9 %
MCH RBC QN AUTO: 31.2 PG (ref 26–34)
MCHC RBC AUTO-ENTMCNC: 33.6 G/DL (ref 32–36)
MCV RBC AUTO: 93 FL (ref 80–100)
MONOCYTES # BLD AUTO: 0.33 X10*3/UL (ref 0.05–0.8)
MONOCYTES NFR BLD AUTO: 7.5 %
NEUTROPHILS # BLD AUTO: 2.38 X10*3/UL (ref 1.6–5.5)
NEUTROPHILS NFR BLD AUTO: 53.8 %
NON HDL CHOLESTEROL: 152 MG/DL (ref 0–149)
NRBC BLD-RTO: 0 /100 WBCS (ref 0–0)
PLATELET # BLD AUTO: 148 X10*3/UL (ref 150–450)
POTASSIUM SERPL-SCNC: 4.9 MMOL/L (ref 3.5–5.3)
PROT SERPL-MCNC: 7.2 G/DL (ref 6.4–8.2)
RBC # BLD AUTO: 4.58 X10*6/UL (ref 4.5–5.9)
SODIUM SERPL-SCNC: 140 MMOL/L (ref 136–145)
TRIGL SERPL-MCNC: 141 MG/DL (ref 0–149)
VLDL: 28 MG/DL (ref 0–40)
WBC # BLD AUTO: 4.4 X10*3/UL (ref 4.4–11.3)

## 2024-03-22 PROCEDURE — 82306 VITAMIN D 25 HYDROXY: CPT

## 2024-03-22 PROCEDURE — 85025 COMPLETE CBC W/AUTO DIFF WBC: CPT

## 2024-03-22 PROCEDURE — 80061 LIPID PANEL: CPT

## 2024-03-22 PROCEDURE — 83036 HEMOGLOBIN GLYCOSYLATED A1C: CPT

## 2024-03-22 PROCEDURE — 80053 COMPREHEN METABOLIC PANEL: CPT

## 2024-03-22 PROCEDURE — 36415 COLL VENOUS BLD VENIPUNCTURE: CPT

## 2024-03-26 ENCOUNTER — TELEPHONE (OUTPATIENT)
Dept: PRIMARY CARE | Facility: CLINIC | Age: 74
End: 2024-03-26
Payer: MEDICARE

## 2024-03-26 DIAGNOSIS — E55.9 VITAMIN D DEFICIENCY: ICD-10-CM

## 2024-03-26 DIAGNOSIS — I10 HYPERTENSION, UNSPECIFIED TYPE: ICD-10-CM

## 2024-03-26 DIAGNOSIS — D69.6 LOW PLATELET COUNT (CMS-HCC): ICD-10-CM

## 2024-03-26 NOTE — TELEPHONE ENCOUNTER
----- Message from Rufus Hudson MD sent at 3/23/2024 10:57 AM EDT -----  Labs are within normal limits or stable except your vitamin D level is mildly low and your platelet count is also mildly low.  A1c is 5.4 which is in the normal range for sugar control.  Recommend taking over-the-counter vitamin D3 2000 units daily.  Repeat CBC, BMP and vitamin D level with diagnoses of low vitamin D, low platelet count, and hypertension in 1 month after starting vitamin D.  Please let him know and arrange

## 2024-04-24 DIAGNOSIS — I10 HYPERTENSION, UNSPECIFIED TYPE: ICD-10-CM

## 2024-04-24 RX ORDER — AMLODIPINE BESYLATE 2.5 MG/1
2.5 TABLET ORAL DAILY
Qty: 90 TABLET | Refills: 0 | Status: SHIPPED | OUTPATIENT
Start: 2024-04-24

## 2024-04-24 NOTE — TELEPHONE ENCOUNTER
----- Message from Julio Miller sent at 4/24/2024 11:03 AM EDT -----  Regarding: Norvasc prescription  Contact: [phone]  Running low on my Amlodipine 2.5 . Can you please call in a refill as we discussed at my previous visit? I have 7 pills left. Thanks much!  Julio Miller [phone]

## 2024-05-13 DIAGNOSIS — N32.81 OVERACTIVE BLADDER: ICD-10-CM

## 2024-05-13 RX ORDER — TAMSULOSIN HYDROCHLORIDE 0.4 MG/1
0.4 CAPSULE ORAL NIGHTLY
COMMUNITY
End: 2024-05-13 | Stop reason: SDUPTHER

## 2024-05-16 RX ORDER — TAMSULOSIN HYDROCHLORIDE 0.4 MG/1
0.4 CAPSULE ORAL NIGHTLY
Qty: 90 CAPSULE | Refills: 3 | Status: SHIPPED | OUTPATIENT
Start: 2024-05-16

## 2024-06-07 ENCOUNTER — APPOINTMENT (OUTPATIENT)
Dept: RADIATION ONCOLOGY | Facility: HOSPITAL | Age: 74
End: 2024-06-07
Payer: MEDICARE

## 2024-06-11 ENCOUNTER — LAB (OUTPATIENT)
Dept: LAB | Facility: LAB | Age: 74
End: 2024-06-11
Payer: MEDICARE

## 2024-06-11 DIAGNOSIS — E55.9 VITAMIN D DEFICIENCY: ICD-10-CM

## 2024-06-11 DIAGNOSIS — C61 MALIGNANT NEOPLASM OF PROSTATE (MULTI): ICD-10-CM

## 2024-06-11 DIAGNOSIS — I10 HYPERTENSION, UNSPECIFIED TYPE: ICD-10-CM

## 2024-06-11 DIAGNOSIS — D69.6 LOW PLATELET COUNT (CMS-HCC): ICD-10-CM

## 2024-06-11 LAB
25(OH)D3 SERPL-MCNC: 29 NG/ML (ref 30–100)
ANION GAP SERPL CALC-SCNC: 12 MMOL/L (ref 10–20)
BASOPHILS # BLD AUTO: 0.03 X10*3/UL (ref 0–0.1)
BASOPHILS NFR BLD AUTO: 0.8 %
BUN SERPL-MCNC: 17 MG/DL (ref 6–23)
CALCIUM SERPL-MCNC: 9 MG/DL (ref 8.6–10.6)
CHLORIDE SERPL-SCNC: 105 MMOL/L (ref 98–107)
CO2 SERPL-SCNC: 27 MMOL/L (ref 21–32)
CREAT SERPL-MCNC: 1.34 MG/DL (ref 0.5–1.3)
EGFRCR SERPLBLD CKD-EPI 2021: 56 ML/MIN/1.73M*2
EOSINOPHIL # BLD AUTO: 0.3 X10*3/UL (ref 0–0.4)
EOSINOPHIL NFR BLD AUTO: 7.9 %
ERYTHROCYTE [DISTWIDTH] IN BLOOD BY AUTOMATED COUNT: 12.2 % (ref 11.5–14.5)
GLUCOSE SERPL-MCNC: 105 MG/DL (ref 74–99)
HCT VFR BLD AUTO: 41 % (ref 41–52)
HGB BLD-MCNC: 13.6 G/DL (ref 13.5–17.5)
IMM GRANULOCYTES # BLD AUTO: 0.01 X10*3/UL (ref 0–0.5)
IMM GRANULOCYTES NFR BLD AUTO: 0.3 % (ref 0–0.9)
LYMPHOCYTES # BLD AUTO: 1.22 X10*3/UL (ref 0.8–3)
LYMPHOCYTES NFR BLD AUTO: 32.2 %
MCH RBC QN AUTO: 30.8 PG (ref 26–34)
MCHC RBC AUTO-ENTMCNC: 33.2 G/DL (ref 32–36)
MCV RBC AUTO: 93 FL (ref 80–100)
MONOCYTES # BLD AUTO: 0.31 X10*3/UL (ref 0.05–0.8)
MONOCYTES NFR BLD AUTO: 8.2 %
NEUTROPHILS # BLD AUTO: 1.92 X10*3/UL (ref 1.6–5.5)
NEUTROPHILS NFR BLD AUTO: 50.6 %
NRBC BLD-RTO: 0 /100 WBCS (ref 0–0)
PLATELET # BLD AUTO: 144 X10*3/UL (ref 150–450)
POTASSIUM SERPL-SCNC: 4.2 MMOL/L (ref 3.5–5.3)
PSA SERPL-MCNC: 0.14 NG/ML
RBC # BLD AUTO: 4.42 X10*6/UL (ref 4.5–5.9)
SODIUM SERPL-SCNC: 140 MMOL/L (ref 136–145)
WBC # BLD AUTO: 3.8 X10*3/UL (ref 4.4–11.3)

## 2024-06-11 PROCEDURE — 84153 ASSAY OF PSA TOTAL: CPT

## 2024-06-11 PROCEDURE — 82306 VITAMIN D 25 HYDROXY: CPT

## 2024-06-11 PROCEDURE — 85025 COMPLETE CBC W/AUTO DIFF WBC: CPT

## 2024-06-11 PROCEDURE — 80048 BASIC METABOLIC PNL TOTAL CA: CPT

## 2024-06-11 PROCEDURE — 36415 COLL VENOUS BLD VENIPUNCTURE: CPT

## 2024-06-13 ENCOUNTER — OFFICE VISIT (OUTPATIENT)
Dept: UROLOGY | Facility: CLINIC | Age: 74
End: 2024-06-13
Payer: MEDICARE

## 2024-06-13 VITALS
SYSTOLIC BLOOD PRESSURE: 126 MMHG | WEIGHT: 189.82 LBS | BODY MASS INDEX: 29.79 KG/M2 | HEIGHT: 67 IN | DIASTOLIC BLOOD PRESSURE: 77 MMHG | HEART RATE: 82 BPM | RESPIRATION RATE: 16 BRPM

## 2024-06-13 DIAGNOSIS — R97.20 ELEVATED PSA: Primary | ICD-10-CM

## 2024-06-13 DIAGNOSIS — R39.9 LOWER URINARY TRACT SYMPTOMS: ICD-10-CM

## 2024-06-13 PROCEDURE — 1157F ADVNC CARE PLAN IN RCRD: CPT | Performed by: UROLOGY

## 2024-06-13 PROCEDURE — 3078F DIAST BP <80 MM HG: CPT | Performed by: UROLOGY

## 2024-06-13 PROCEDURE — 1160F RVW MEDS BY RX/DR IN RCRD: CPT | Performed by: UROLOGY

## 2024-06-13 PROCEDURE — 1036F TOBACCO NON-USER: CPT | Performed by: UROLOGY

## 2024-06-13 PROCEDURE — 99214 OFFICE O/P EST MOD 30 MIN: CPT | Performed by: UROLOGY

## 2024-06-13 PROCEDURE — 3074F SYST BP LT 130 MM HG: CPT | Performed by: UROLOGY

## 2024-06-13 PROCEDURE — 1126F AMNT PAIN NOTED NONE PRSNT: CPT | Performed by: UROLOGY

## 2024-06-13 PROCEDURE — 1159F MED LIST DOCD IN RCRD: CPT | Performed by: UROLOGY

## 2024-06-13 ASSESSMENT — PAIN SCALES - GENERAL: PAINLEVEL: 0-NO PAIN

## 2024-06-13 NOTE — PROGRESS NOTES
PRIOR NOTES  73-year-old male seeing me for prostate cancer, spermatocele, LUTS, and hypogonadism  Kindly referred by Dr. Johnsonami Gaspary, his wife, is present with him  PMH: Hypertension, depression, GERD, hyperlipidemia, hypogonadism, prior spermatocelectomy  On aspirin only  Spermatocele recurrent not bothersome  Testosterone 10/2015 - 201  Not actively taking TRT - previously tried patch but had adhesion allergy  Elevated PSA in Feb, took cipro from Feb - March, repeat still high  FHx PCa: none  PSA history:  03/22: 9.20  02/22: 8.80  11/19: 5.67  Prior biopsy: no  Additional tests done: none  Endorses weak stream, intermittency, double voiding, urgency  Denies straining, hematuria, incomplete emptying, hesitancy  NTF 1x but up to 3x (affected by caffeine and chocolate)  PVR 25cc  - Erections poor at baseline, ~50%     PROSTATE CA HX  - 05/2022 - MRI-P - 48.2g gland, PSAD 0.19, PIRADs 5 lesion anterior fibromuscular stroma w/ capsular irregularity extending to b/l t-zone and p-zone  - 06/28/2022 - INITIAL DX - OR 6/28 - 16 core template + targeted   - Path - GG2 adenoca located LA, anterior target in 3/4 fragments, 70% of tissue max (5% G4), no crib or IDC   - Intermediate risk favorable - cT1c, GG2, 3/19 cores, 5% pattern 4, PSA 9.2, MRI suggests EPE however not conclusive  - 7/8/22 - bone scan - two foci of activity in sternum and lower spine, possibly DJD  - 07/21/2022 - PSMA PET - no evidence metastatic disease  - 9/8/22 - completed SBRT, 40 Gy in 5 fractions, no concurrent ADT  - 12/2022 - PSA 0.90  - 06/2023 - PSA 0.62  - 12/5/23 - PSA 0.19  - 6/11/2024 - PSA 0.14     UPDATED SUBJECTIVE HISTORY  06/13/24 - Urgency has improved. Voiding well. No issues otherwise.     Past Medical History  He has a past medical history of Encounter for general adult medical examination without abnormal findings (03/01/2022), Encounter for screening for malignant neoplasm of colon, Encounter for screening for malignant neoplasm  "of colon (03/18/2022), Encounter for screening for malignant neoplasm of prostate (10/21/2015), Other conditions influencing health status (02/17/2021), Palpitations (08/18/2013), Personal history of diseases of the skin and subcutaneous tissue (01/15/2016), Personal history of other diseases of the respiratory system (02/19/2021), and Tinea cruris.    Surgical History  He has a past surgical history that includes Other surgical history (04/15/2022); Other surgical history (04/15/2022); Other surgical history (04/15/2022); and Other surgical history (04/15/2022).     Social History  He reports that he has never smoked. He has never used smokeless tobacco. No history on file for alcohol use and drug use.    Family History  Family History   Problem Relation Name Age of Onset    Migraines Mother      Other (Heart palpitations) Mother      Alzheimer's disease Father      Prostatitis Father      Parkinsonism Father          Allergies  Cat dander, Cefuroxime axetil, House dust, Mold, and Testosterone    ROS: 12 system review was completed and is negative with the exception of those signs and symptoms noted in the history of present illness: A 12 system review was completed and is negative with the exception of those signs and symptoms noted in the history of present illness.     Exam:  General: in NAD, appears stated age  Head: normocephalic, atraumatic  Respiratory: normal effort, no use of accessory muscles  Cardiovascular: no edema noted  Skin: normal turgor, no rashes  Neurologic: grossly intact, oriented to person/place/time  Psychiatric: mode and affect appropriate     Last Recorded Vitals  Blood pressure 126/77, pulse 82, resp. rate 16, height 1.702 m (5' 7\"), weight 86.1 kg (189 lb 13.1 oz).    Lab Results   Component Value Date    PSASCREEN 8.80 (H) 02/24/2022    CREATININE 1.34 (H) 06/11/2024    HGB 13.6 06/11/2024         ASSESSMENT/PLAN:  # Prostate cancer  -Excellent response to radiation, essentially 2 years " disease-free at this point  -Annual PSA checks    # Lower urinary tract symptoms  -Continue tamsulosin, refills given today    Fuentes Caba MD

## 2024-06-19 ASSESSMENT — ENCOUNTER SYMPTOMS
PSYCHIATRIC NEGATIVE: 1
HEMATURIA: 0
DIFFICULTY URINATING: 0
CHEST TIGHTNESS: 0
BLOOD IN STOOL: 0
ARTHRALGIAS: 0
UNEXPECTED WEIGHT CHANGE: 0
FATIGUE: 0
PALPITATIONS: 0
ABDOMINAL PAIN: 0
SHORTNESS OF BREATH: 0
ALLERGIC/IMMUNOLOGIC NEGATIVE: 1
JOINT SWELLING: 0
DYSURIA: 0
WEAKNESS: 0
DIARRHEA: 0
RECTAL PAIN: 0
FEVER: 0
DIZZINESS: 0
FREQUENCY: 0
ANAL BLEEDING: 0
BACK PAIN: 0
CONSTIPATION: 0
COUGH: 0

## 2024-06-19 NOTE — PROGRESS NOTES
Cancer synopsis:  Rad/onc: Dr. Newberry-Dr. Cummings/ [facility]  Urology: Dr. Caba     73 [id number] favorable intermediate risk prostate cancer, PSA 9.2, [place] 3+4=7 in 3/19 cores, 40-80%, from pirads5 PSMA PET+ anterior apex lesion, possible  EPE anteriorly on MRI. 48cc. PVR 25cc      PRIOR TREATMENT: SBRT utilizing 10 MV photons, VMAT FFF technique, 40Gy in 5 fractions of 8Gy each.  Treatment completed on 9/8/2022.  No androgen ablation used.  SpaceOAR gel and fiducials placed prior to treatment.    Recent imaging:  none    PMH:  HLD, GERD, HTN, depression, spermatoocele, OAB    History of presenting illness:    Patient ID: [id number]     Julio Miller is a 73 y.o. male who presents for his FIR prostate 3+4=7 IPSA <10 s/p RT.    RT Site: Prostate  RT Date: 09/08/2022  Hormone therapy: No  Hot Flushes: Denies  Fatigue: Denies  Bone pain: Denies  CLIVE: n/a virtual  ED: Does report some erectile changes since RT.  ED medications: No  IPSS: n/a virtual  Urinary symptoms: Admits to reduced stream without flowmax. Admits urgency daily however has improved since last visit. Denies dysuria, hematuria.   Urinary Medications: Yes, flowmax daily  Rectal bleeding: Denies  Colonoscopy:06/2022: Multiple polyps removed, hemmoirds noted internally non bleeding. Next due in 5yrs  Other systems: Denies SOB, CP or fever.    Review of systems:  Review of Systems   Constitutional:  Negative for fatigue, fever and unexpected weight change.   Respiratory:  Negative for cough, chest tightness and shortness of breath.    Cardiovascular:  Negative for chest pain, palpitations and leg swelling.   Gastrointestinal:  Negative for abdominal pain, anal bleeding, blood in stool, constipation, diarrhea and rectal pain.   Endocrine: Negative for cold intolerance, heat intolerance and polyuria.   Genitourinary:  Negative for decreased urine volume, difficulty urinating, dysuria, frequency, hematuria and urgency.   Musculoskeletal:  Negative for  arthralgias, back pain, gait problem and joint swelling.   Skin: Negative.    Allergic/Immunologic: Negative.    Neurological:  Negative for dizziness, syncope and weakness.   Psychiatric/Behavioral: Negative.         Past Medical history  Past Medical History:   Diagnosis Date    Encounter for general adult medical examination without abnormal findings 03/01/2022    Medicare annual wellness visit, subsequent    Encounter for screening for malignant neoplasm of colon     Screen for colon cancer    Encounter for screening for malignant neoplasm of colon 03/18/2022    Colon cancer screening    Encounter for screening for malignant neoplasm of prostate 10/21/2015    Screening for prostate cancer    Other conditions influencing health status 02/17/2021    History of cough    Palpitations 08/18/2013    Palpitations    Personal history of diseases of the skin and subcutaneous tissue 01/15/2016    History of contact dermatitis    Personal history of other diseases of the respiratory system 02/19/2021    History of nasal discharge    Tinea cruris     Jock itch        Surgical/family history  Family History   Problem Relation Name Age of Onset    Migraines Mother      Other (Heart palpitations) Mother      Alzheimer's disease Father      Prostatitis Father      Parkinsonism Father        Past Surgical History:   Procedure Laterality Date    OTHER SURGICAL HISTORY  04/15/2022    Foot surgery    OTHER SURGICAL HISTORY  04/15/2022    Vasectomy    OTHER SURGICAL HISTORY  04/15/2022    Excision of spermatocele    OTHER SURGICAL HISTORY  04/15/2022    Hemorrhoidectomy simple        Social History  Tobacco Use: Low Risk  (6/13/2024)    Patient History     Smoking Tobacco Use: Never     Smokeless Tobacco Use: Never     Passive Exposure: Not on file         Current med list:  Current Outpatient Medications   Medication Instructions    amLODIPine (NORVASC) 2.5 mg, oral, Daily    aspirin 81 mg EC tablet 1 tablet, oral, Daily     CALCIUM-MAGNESIUM-ZINC ORAL oral    potassium citrate 99 mg capsule oral    rosuvastatin (CRESTOR) 5 mg, oral, Every other day    tamsulosin (FLOMAX) 0.4 mg, oral, Nightly      Last recorded vital:  N/a Virtual    Physical exam  N/a virtual    Pertinent labs:  Prostate Specific AG   Date/Time Value Ref Range Status   06/11/2024 09:48 AM 0.14 <=4.00 ng/mL Final     Dx:  Problem List Items Addressed This Visit    None  PSA of 0.14 was reviewed and is declining. Review of latent SE including rectal bleeding, hematuria, urinary strictures, ED where reviewed as well as how to contact office if s/s present. Does report ED not concerned at this time however and will monitor. Denies latent SE. NCCN guidelines where reviewed and routine FUV of every 3m for first year and every 6m for four years for a total of five years was discussed. Patient verbalized understanding.     PLAN:  FUV 6m virtual (will move to annual basis after next visit to rotate with urology so patient is seen every 6m for PSA with either practice)  Labs Psa in 6m  Imaging none  Flowmax daily  FUV other providers: PCP for routine FUV, Urology PRN    Please contact office with any concerns:  Romero Scruggs CNP  [phone]    I performed this visit using realtime telehealth tools, including an audio/video OR telephone connection between patient’s name and location Julio Miller and Romero Hitchcock CNP.    2. POS 10: Telehealth provided in patient's home.  o Patient is located in their home (which is a location other than a hospital or other  facility where the patient receives care in a private residence) when receiving  health services or health related services through telecommunication technology.

## 2024-06-20 ENCOUNTER — TELEPHONE (OUTPATIENT)
Dept: RADIATION ONCOLOGY | Facility: HOSPITAL | Age: 74
End: 2024-06-20
Payer: MEDICARE

## 2024-06-20 ENCOUNTER — HOSPITAL ENCOUNTER (OUTPATIENT)
Dept: RADIATION ONCOLOGY | Facility: HOSPITAL | Age: 74
Setting detail: RADIATION/ONCOLOGY SERIES
Discharge: HOME | End: 2024-06-20
Payer: MEDICARE

## 2024-06-20 DIAGNOSIS — C61 MALIGNANT NEOPLASM OF PROSTATE (MULTI): Primary | ICD-10-CM

## 2024-06-20 PROCEDURE — 99213 OFFICE O/P EST LOW 20 MIN: CPT

## 2024-08-02 DIAGNOSIS — I10 HYPERTENSION, UNSPECIFIED TYPE: ICD-10-CM

## 2024-08-02 RX ORDER — AMLODIPINE BESYLATE 2.5 MG/1
2.5 TABLET ORAL DAILY
Qty: 30 TABLET | Refills: 0 | Status: SHIPPED | OUTPATIENT
Start: 2024-08-02 | End: 2024-08-02

## 2024-08-02 RX ORDER — AMLODIPINE BESYLATE 2.5 MG/1
2.5 TABLET ORAL DAILY
Qty: 30 TABLET | Refills: 0 | Status: SHIPPED | OUTPATIENT
Start: 2024-08-02

## 2024-08-02 NOTE — TELEPHONE ENCOUNTER
MEDICATIONS PENDED    Patient made an appointment for 24. He states Dr Hudson had told him that he didn't need to come in every 6 months for medications. He thinks it's unnecessary to come in just to get refills.     Requesting a short supply. Aware this will be left with another provider since Dr Hudson is out of the office     Rx Refill Request Telephone Encounter    Name:  Julio Miller  : 1950     Medication Name:  Amlodipine  Dose (Optional):    2.5 mg  Quantity (Optional):      Directions (Optional):   Take 1 tablet (2.5 mg) by mouth once daily     ALLERGIES:   see list     Specific Pharmacy location:  [facility]    Date of last appointment:  24  Date of next appointment:  24    Best number to reach patient:  [phone]

## 2024-08-02 NOTE — TELEPHONE ENCOUNTER
Last seen 2/27/24  Medication pended        Julio GOMEZ Do [id number] [facility] Clinical Support Staff (supporting Rufus Hudson MD)8 minutes ago (11:06 AM)       I've got 7 pills left, so please approve a refill for me. Thanks!  Julio Miller [phone]

## 2024-08-13 DIAGNOSIS — N32.81 OVERACTIVE BLADDER: ICD-10-CM

## 2024-08-13 RX ORDER — TAMSULOSIN HYDROCHLORIDE 0.4 MG/1
0.4 CAPSULE ORAL NIGHTLY
Qty: 90 CAPSULE | Refills: 3 | Status: SHIPPED | OUTPATIENT
Start: 2024-08-13

## 2024-08-20 ENCOUNTER — APPOINTMENT (OUTPATIENT)
Dept: PRIMARY CARE | Facility: CLINIC | Age: 74
End: 2024-08-20
Payer: MEDICARE

## 2024-08-20 VITALS
SYSTOLIC BLOOD PRESSURE: 120 MMHG | WEIGHT: 194 LBS | BODY MASS INDEX: 30.45 KG/M2 | RESPIRATION RATE: 18 BRPM | HEART RATE: 75 BPM | OXYGEN SATURATION: 99 % | HEIGHT: 67 IN | DIASTOLIC BLOOD PRESSURE: 78 MMHG

## 2024-08-20 DIAGNOSIS — C61 PROSTATE CANCER (MULTI): ICD-10-CM

## 2024-08-20 DIAGNOSIS — E78.00 HYPERCHOLESTEROLEMIA: ICD-10-CM

## 2024-08-20 DIAGNOSIS — N18.31 STAGE 3A CHRONIC KIDNEY DISEASE (MULTI): ICD-10-CM

## 2024-08-20 DIAGNOSIS — I10 HYPERTENSION, UNSPECIFIED TYPE: ICD-10-CM

## 2024-08-20 DIAGNOSIS — K21.9 GASTROESOPHAGEAL REFLUX DISEASE WITHOUT ESOPHAGITIS: ICD-10-CM

## 2024-08-20 DIAGNOSIS — Z00.00 MEDICARE ANNUAL WELLNESS VISIT, SUBSEQUENT: Primary | ICD-10-CM

## 2024-08-20 PROCEDURE — G0439 PPPS, SUBSEQ VISIT: HCPCS | Performed by: FAMILY MEDICINE

## 2024-08-20 PROCEDURE — 1170F FXNL STATUS ASSESSED: CPT | Performed by: FAMILY MEDICINE

## 2024-08-20 PROCEDURE — 1160F RVW MEDS BY RX/DR IN RCRD: CPT | Performed by: FAMILY MEDICINE

## 2024-08-20 PROCEDURE — 1124F ACP DISCUSS-NO DSCNMKR DOCD: CPT | Performed by: FAMILY MEDICINE

## 2024-08-20 PROCEDURE — 3008F BODY MASS INDEX DOCD: CPT | Performed by: FAMILY MEDICINE

## 2024-08-20 PROCEDURE — 3074F SYST BP LT 130 MM HG: CPT | Performed by: FAMILY MEDICINE

## 2024-08-20 PROCEDURE — 99213 OFFICE O/P EST LOW 20 MIN: CPT | Performed by: FAMILY MEDICINE

## 2024-08-20 PROCEDURE — 1157F ADVNC CARE PLAN IN RCRD: CPT | Performed by: FAMILY MEDICINE

## 2024-08-20 PROCEDURE — 3078F DIAST BP <80 MM HG: CPT | Performed by: FAMILY MEDICINE

## 2024-08-20 PROCEDURE — 1159F MED LIST DOCD IN RCRD: CPT | Performed by: FAMILY MEDICINE

## 2024-08-20 ASSESSMENT — ACTIVITIES OF DAILY LIVING (ADL)
GROCERY_SHOPPING: INDEPENDENT
DOING_HOUSEWORK: INDEPENDENT
TAKING_MEDICATION: INDEPENDENT
DRESSING: INDEPENDENT
MANAGING_FINANCES: INDEPENDENT
BATHING: INDEPENDENT

## 2024-08-20 ASSESSMENT — PATIENT HEALTH QUESTIONNAIRE - PHQ9
2. FEELING DOWN, DEPRESSED OR HOPELESS: NOT AT ALL
SUM OF ALL RESPONSES TO PHQ9 QUESTIONS 1 AND 2: 0
1. LITTLE INTEREST OR PLEASURE IN DOING THINGS: NOT AT ALL

## 2024-08-20 NOTE — PROGRESS NOTES
"Subjective   Patient ID: Julio Miller is a 73 y.o. male who presents for Medicare Annual Wellness Visit Subsequent, Hypertension, and Hyperlipidemia.  HPI    AWV    HTN and HLD follow up  Denies chest pain,SOB, swelling, headaches, lightheadedness or dizziness.   Eats a generally healthy diet, Exercises.   Does not check BP at home.   Medication working well     Taking current medications which were reviewed.  Problem list discussed.    Overall doing well.  Eating okay.  Staying active.    Has no other new problem /question.     ROS  Constitutional- No activity change. No appetite change.  Eyes- Denies vision changes.  Respiratory- No shortness of breath.  Cardiovascular- No palpitations. No chest pain.  GI- No nausea or vomiting. No diarrhea or constipation. Denies abdominal pain.  Musculoskeletal- Denies joint swelling.  Extremities- No edema.  Neurological- Denies headaches. Denies dizziness.  Skin- No rashes.  Psychiatric/Behavioral- Denies significant anxiety, or depressed mood.     Objective     /78   Pulse 75   Resp 18   Ht 1.702 m (5' 7\")   Wt 88 kg (194 lb)   SpO2 99%   BMI 30.38 kg/m²     Allergies   Allergen Reactions    Cat Dander Itching    Cefuroxime Axetil Diarrhea    House Dust Itching    Mold Unknown    Testosterone Other and Rash     Dermatitis with the patch form       Constitutional-- Well-nourished.  No distress  Head- unremarkable.  Ears- TMs clear.  Eyes- PERRL.  Conjunctiva normal.  Nose- Normal.  No rhinorrhea noted.  Throat- Oropharynx is clear and moist.  Neck- Supple with no thyromegaly.  No significant cervical adenopathy noted.  Pulmonary/Chest- Breath sounds normal with normal effort.  No wheezing.  Heart- Regular rate and rhythm.  No murmur.  Abdomen- Soft and non-tender.  No masses noted.  Musculoskeletal- Normal ROM.  No significant joint swelling  Extremities- No edema.   Neurological- Alert.  No noted deficits.  Skin- Warm.  No rashes.  Psychiatric/Behavioral- Mood " and affect normal.  Behavior normal.     Assessment/Plan   1. Medicare annual wellness visit, subsequent        2. Hypertension, unspecified type  amLODIPine (Norvasc) 2.5 mg tablet      3. Stage 3a chronic kidney disease (Multi)        4. Gastroesophageal reflux disease without esophagitis        5. Prostate cancer (Multi)        6. Hypercholesterolemia               Long talk. Treatment options reviewed.    Medicare wellness questionnaire reviewed in detail. Advanced Directives reviewed today, Importance of having Advance care planing discussed. Patient advised to provide the office with a copy if has not already done so. No problems with activities of daily living. Falls risks reviewed.      Reviewed most recent lab work with patient. Advised patient to remain up to date on routine maintenance and health screening.     Discussed hypertension.  Hold on meds.  Will continue to monitor.   Sees urology for prostate cancer follow-ups  Chronic kidney disease controlled. The patient does not wish to go for 24-hour urine creatinine clearance and does not wish to go see the nephrologist. Will continue to monitor renal function.     Continue and take your medications as prescribed.    Health Maintenance issues discussed.    Importance of healthy diet and regular exercise regimen discussed.    We will contact you with any test results ordered. If you do not hear from us, please contact.    Follow-up as instructed or sooner if any problems or symptoms do not resolve as expected.        Scribe Attestation  By signing my name below, Ilda STRINGER Scribe   attest that this documentation has been prepared under the direction and in the presence of Rufus Hudson MD.

## 2024-08-25 RX ORDER — AMLODIPINE BESYLATE 2.5 MG/1
2.5 TABLET ORAL DAILY
Qty: 90 TABLET | Refills: 1 | Status: SHIPPED | OUTPATIENT
Start: 2024-08-25

## 2024-08-31 ENCOUNTER — HOSPITAL ENCOUNTER (OUTPATIENT)
Dept: RADIOLOGY | Facility: EXTERNAL LOCATION | Age: 74
Discharge: HOME | End: 2024-08-31
Payer: MEDICARE

## 2024-08-31 DIAGNOSIS — M54.50 LOW BACK PAIN, UNSPECIFIED BACK PAIN LATERALITY, UNSPECIFIED CHRONICITY, UNSPECIFIED WHETHER SCIATICA PRESENT: ICD-10-CM

## 2024-09-03 ENCOUNTER — TELEPHONE (OUTPATIENT)
Dept: PRIMARY CARE | Facility: CLINIC | Age: 74
End: 2024-09-03
Payer: MEDICARE

## 2024-09-03 DIAGNOSIS — M62.838 MUSCLE SPASM: Primary | ICD-10-CM

## 2024-09-03 RX ORDER — CYCLOBENZAPRINE HCL 10 MG
10 TABLET ORAL 3 TIMES DAILY PRN
Qty: 30 TABLET | Refills: 0 | Status: SHIPPED | OUTPATIENT
Start: 2024-09-03 | End: 2024-11-02

## 2024-09-03 NOTE — TELEPHONE ENCOUNTER
Please advise patient message.     Medication for lower back strain/sprain  Julio GOMEZ Do [id number] [facility] Clinical Support Staff (supporting Rufus Hudson MD)3 days ago     I went to  Urgent Care in [place] (Dr. Hall) today due to sharp lower back pain I had for about a week.  XRay was not conclusive but tests did not indicate spinal nerve involvement, at this time.  He suggested OTC meds for pain & inflammation.      Is there anything you could prescribe for pain or inflam., or muscle relaxer that might be stronger than OTC?   Do I need to schedule a visit with you?

## 2024-09-03 NOTE — TELEPHONE ENCOUNTER
Please advise      Julio GOMEZ Do [id number] [facility] Clinical Support Staff (supporting Rufus Hudson MD)2 hours ago (10:05 AM)       Thanks Alanna! I found some old Flexeril in my medicine cabinet so if I can't get a new script for something similar I will try these older pills if it acts up again. The pain seems to be resolving into mild muscle ache and stiffness so I'm thinking it might have been caused by erector spinae strain caused by recent pulling of weeds in our several pollinator gardens.

## 2024-09-03 NOTE — TELEPHONE ENCOUNTER
Please let him know I sent the refill medication for Flexeril muscle relaxer to be used as directed.  If not continuing to improve with this medication and rest and over-the-counter treatment he should follow-up with me to be evaluated.  Please let him know         [employer] message sent

## 2024-11-04 DIAGNOSIS — E78.00 HYPERCHOLESTEROLEMIA: ICD-10-CM

## 2024-11-04 RX ORDER — ROSUVASTATIN CALCIUM 5 MG/1
5 TABLET, COATED ORAL EVERY OTHER DAY
Qty: 90 TABLET | Refills: 0 | Status: SHIPPED | OUTPATIENT
Start: 2024-11-04

## 2024-11-04 NOTE — TELEPHONE ENCOUNTER
Rx Refill Request Telephone Encounter    Name:  Julio Miller  :  [id number]    rosuvastatin (Crestor) 5 mg tablet [[id number]]    Order Details  Dose: 5 mg Route: oral Frequency: Every other day   Dispense Quantity: -- Refills: --          Sig: Take 1 tablet (5 mg) by mouth every other day.     Specific Pharmacy location:    [facility] [address] [place] [id number] AT [id number] [address]     Date of last appointment:  2024  Date of next appointment:  2025  Best number to reach patient:  [phone]

## 2024-12-03 ENCOUNTER — LAB (OUTPATIENT)
Dept: LAB | Facility: LAB | Age: 74
End: 2024-12-03
Payer: MEDICARE

## 2024-12-03 DIAGNOSIS — C61 MALIGNANT NEOPLASM OF PROSTATE (MULTI): ICD-10-CM

## 2024-12-03 LAB — PSA SERPL-MCNC: 0.1 NG/ML

## 2024-12-03 PROCEDURE — 36415 COLL VENOUS BLD VENIPUNCTURE: CPT

## 2024-12-03 PROCEDURE — 84153 ASSAY OF PSA TOTAL: CPT

## 2024-12-05 ENCOUNTER — TELEPHONE (OUTPATIENT)
Dept: RADIATION ONCOLOGY | Facility: HOSPITAL | Age: 74
End: 2024-12-05
Payer: MEDICARE

## 2024-12-05 ASSESSMENT — ENCOUNTER SYMPTOMS
FATIGUE: 0
BLOOD IN STOOL: 0
PSYCHIATRIC NEGATIVE: 1
FEVER: 0
JOINT SWELLING: 0
DIZZINESS: 0
WEAKNESS: 0
RECTAL PAIN: 0
SHORTNESS OF BREATH: 0
UNEXPECTED WEIGHT CHANGE: 0
PALPITATIONS: 0
CHEST TIGHTNESS: 0
DIARRHEA: 0
HEMATURIA: 0
ANAL BLEEDING: 0
BACK PAIN: 0
DYSURIA: 0
CONSTIPATION: 0
ABDOMINAL PAIN: 0
ARTHRALGIAS: 0
DIFFICULTY URINATING: 0
FREQUENCY: 0
COUGH: 0
ALLERGIC/IMMUNOLOGIC NEGATIVE: 1

## 2024-12-05 NOTE — PROGRESS NOTES
Cancer synopsis:  Rad/onc: Dr. Newberry-Dr. Cummings/ [facility]  Urology: Dr. Caba     74 [id number] favorable intermediate risk prostate cancer, PSA 9.2, [place] 3+4=7 in 3/19 cores, 40-80%, from pirads5 PSMA PET+ anterior apex lesion, possible  EPE anteriorly on MRI. 48cc. PVR 25cc      PRIOR TREATMENT: SBRT utilizing 10 MV photons, VMAT FFF technique, 40Gy in 5 fractions of 8Gy each.  Treatment completed on 9/8/2022.  No androgen ablation used.  SpaceOAR gel and fiducials placed prior to treatment.    Recent imaging:  none    PMH:  HLD, GERD, HTN, depression, spermatoocele, OAB    History of presenting illness:    Patient ID: [id number]     Julio Miller is a 74 y.o. male who presents for his FIR prostate 3+4=7 IPSA <10 s/p RT.    RT Site: Prostate  RT Date: 09/08/2022  Hormone therapy: No  Hot Flushes: Denies  Fatigue: Denies  Bone pain: Denies  CLIVE: n/a virtual  ED: Does report some erectile changes since RT.  ED medications: No  IPSS: n/a virtual  Urinary symptoms: Admits to reduced stream without flowmax, has been using every other day with reduction SE. Admits urgency daily however has improved since last visit. Denies dysuria, hematuria.   Urinary Medications: Yes, flowmax (every other day)  Rectal bleeding: Denies  Colonoscopy:06/2022: Multiple polyps removed, hemmoirds noted internally non bleeding. Next due in 5yrs  Other systems: Denies SOB, CP or fever.    Review of systems:  Review of Systems   Constitutional:  Negative for fatigue, fever and unexpected weight change.   Respiratory:  Negative for cough, chest tightness and shortness of breath.    Cardiovascular:  Negative for chest pain, palpitations and leg swelling.   Gastrointestinal:  Negative for abdominal pain, anal bleeding, blood in stool, constipation, diarrhea and rectal pain.   Endocrine: Negative for cold intolerance, heat intolerance and polyuria.   Genitourinary:  Negative for decreased urine volume, difficulty urinating, dysuria, frequency,  hematuria and urgency.   Musculoskeletal:  Negative for arthralgias, back pain, gait problem and joint swelling.   Skin: Negative.    Allergic/Immunologic: Negative.    Neurological:  Negative for dizziness, syncope and weakness.   Psychiatric/Behavioral: Negative.       Past Medical history  Past Medical History:   Diagnosis Date    Encounter for general adult medical examination without abnormal findings 03/01/2022    Medicare annual wellness visit, subsequent    Encounter for screening for malignant neoplasm of colon     Screen for colon cancer    Encounter for screening for malignant neoplasm of colon 03/18/2022    Colon cancer screening    Encounter for screening for malignant neoplasm of prostate 10/21/2015    Screening for prostate cancer    Other conditions influencing health status 02/17/2021    History of cough    Palpitations 08/18/2013    Palpitations    Personal history of diseases of the skin and subcutaneous tissue 01/15/2016    History of contact dermatitis    Personal history of other diseases of the respiratory system 02/19/2021    History of nasal discharge    Tinea cruris     Jock itch      Surgical/family history  Family History   Problem Relation Name Age of Onset    Migraines Mother      Other (Heart palpitations) Mother      Alzheimer's disease Father      Prostatitis Father      Parkinsonism Father        Past Surgical History:   Procedure Laterality Date    OTHER SURGICAL HISTORY  04/15/2022    Foot surgery    OTHER SURGICAL HISTORY  04/15/2022    Vasectomy    OTHER SURGICAL HISTORY  04/15/2022    Excision of spermatocele    OTHER SURGICAL HISTORY  04/15/2022    Hemorrhoidectomy simple      Social History  Tobacco Use: Low Risk  (8/20/2024)    Patient History     Smoking Tobacco Use: Never     Smokeless Tobacco Use: Never     Passive Exposure: Not on file       Current med list:  Current Outpatient Medications   Medication Instructions    amLODIPine (NORVASC) 2.5 mg, oral, Daily     amLODIPine (NORVASC) 2.5 mg, oral, Daily    aspirin 81 mg EC tablet 1 tablet, oral, Daily    CALCIUM-MAGNESIUM-ZINC ORAL oral    cyclobenzaprine (FLEXERIL) 10 mg, oral, 3 times daily PRN    potassium citrate 99 mg capsule oral    rosuvastatin (CRESTOR) 5 mg, oral, Every other day    tamsulosin (FLOMAX) 0.4 mg, oral, Nightly      Last recorded vital:  N/a Virtual    Physical exam  N/a virtual    Pertinent labs:  Prostate Specific AG   Date/Time Value Ref Range Status   12/03/2024 11:17 AM 0.10 <=4.00 ng/mL Final     Dx:  Problem List Items Addressed This Visit    None  Visit Diagnoses       Malignant neoplasm of prostate (Multi)    -  Primary    Relevant Orders    Clinic Appointment Request Follow Up; ROMERO CORNELL (virtual); [facility] (virtual)    Prostate Specific Antigen    Clinic Appointment Request Follow Up; ROMERO CORNELL (virtual); [facility] (virtual) (Completed)        PSA of 0.10 was reviewed and is declining. Review of latent SE including rectal bleeding, hematuria, urinary strictures, ED where reviewed as well as how to contact office if s/s present. Does report ED not concerned at this time however and will monitor. Denies latent SE. NCCN guidelines where reviewed and routine FUV of every 3m for first year and every 6m for four years for a total of five years was discussed. Patient verbalized understanding.     PLAN:  FUV 6m virtual   Labs Psa in 6m  Imaging none  Flowmax daily  FUV other providers: PCP for routine FUV, Urology PRN    Please contact office with any concerns:  Romero Scruggs CNP  [phone]    I performed this visit using realtime telehealth tools, including an audio/video OR telephone connection between patient’s name and location Julio Miller and Romero Cornell CNP.    2. POS 10: Telehealth provided in patient's home.  o Patient is located in their home (which is a location other than a hospital or other facility where the patient receives care in a private  residence) when receiving health services or health related services through telecommunication technology.

## 2024-12-06 ENCOUNTER — HOSPITAL ENCOUNTER (OUTPATIENT)
Dept: RADIATION ONCOLOGY | Facility: HOSPITAL | Age: 74
Setting detail: RADIATION/ONCOLOGY SERIES
Discharge: HOME | End: 2024-12-06
Payer: MEDICARE

## 2024-12-06 DIAGNOSIS — C61 MALIGNANT NEOPLASM OF PROSTATE (MULTI): Primary | ICD-10-CM

## 2024-12-06 DIAGNOSIS — R39.12 WEAK URINARY STREAM: ICD-10-CM

## 2024-12-06 PROCEDURE — 99213 OFFICE O/P EST LOW 20 MIN: CPT

## 2024-12-06 PROCEDURE — 99213 OFFICE O/P EST LOW 20 MIN: CPT | Mod: 95

## 2024-12-06 NOTE — ADDENDUM NOTE
Encounter addended by: PATTI Carranza-DANIEL on: 12/6/2024 1:21 PM   Actions taken: Flowsheet accepted, Clinical Note Signed

## 2024-12-06 NOTE — ADDENDUM NOTE
Encounter addended by: PATTI Carranza-DANIEL on: 12/6/2024 1:25 PM   Actions taken: Visit diagnoses modified, Level of Service modified, Clinical Note Signed

## 2024-12-30 DIAGNOSIS — J06.9 UPPER RESPIRATORY TRACT INFECTION, UNSPECIFIED TYPE: ICD-10-CM

## 2024-12-30 RX ORDER — AZITHROMYCIN 250 MG/1
TABLET, FILM COATED ORAL
Qty: 6 TABLET | Refills: 0 | Status: SHIPPED | OUTPATIENT
Start: 2024-12-30 | End: 2025-01-03

## 2024-12-30 NOTE — TELEPHONE ENCOUNTER
PLEASE ADVISE    Julio [id number] [facility] Clinical Support Staff (supporting Rufus Hudson MD)18 hours ago (1:34 PM)       Got a headcold a couple weeks ago and the nasal drainage is still continuing. Can you authorize an antibiotic ? I'm not allergic to any.

## 2024-12-30 NOTE — TELEPHONE ENCOUNTER
Please arrange for a Z-Gordo to be sent to the drugstore with diagnosis of URI.  After arranging please let her know we will be sending in an antibiotic for her to take it as directed.  Over-the-counter cold medicine as needed for symptom relief.  Please arrange       Patient aware we are going to send medication to the pharmacy.    Medication pended

## 2025-02-17 ENCOUNTER — APPOINTMENT (OUTPATIENT)
Dept: RADIOLOGY | Facility: HOSPITAL | Age: 75
DRG: 493 | End: 2025-02-17
Payer: MEDICARE

## 2025-02-17 ENCOUNTER — HOSPITAL ENCOUNTER (INPATIENT)
Facility: HOSPITAL | Age: 75
LOS: 3 days | Discharge: HOME | DRG: 493 | End: 2025-02-21
Attending: STUDENT IN AN ORGANIZED HEALTH CARE EDUCATION/TRAINING PROGRAM
Payer: MEDICARE

## 2025-02-17 ENCOUNTER — APPOINTMENT (OUTPATIENT)
Dept: CARDIOLOGY | Facility: HOSPITAL | Age: 75
DRG: 493 | End: 2025-02-17
Payer: MEDICARE

## 2025-02-17 DIAGNOSIS — R55 SYNCOPE, UNSPECIFIED SYNCOPE TYPE: ICD-10-CM

## 2025-02-17 DIAGNOSIS — S82.891A CLOSED FRACTURE OF RIGHT ANKLE, INITIAL ENCOUNTER: Primary | ICD-10-CM

## 2025-02-17 DIAGNOSIS — N32.81 OVERACTIVE BLADDER: ICD-10-CM

## 2025-02-17 DIAGNOSIS — S82.891D CLOSED FRACTURE OF RIGHT ANKLE WITH ROUTINE HEALING, SUBSEQUENT ENCOUNTER: ICD-10-CM

## 2025-02-17 DIAGNOSIS — N17.9 AKI (ACUTE KIDNEY INJURY) (CMS-HCC): ICD-10-CM

## 2025-02-17 DIAGNOSIS — K92.2 LOWER GI BLEED: ICD-10-CM

## 2025-02-17 DIAGNOSIS — E78.00 HYPERCHOLESTEROLEMIA: ICD-10-CM

## 2025-02-17 LAB
ALBUMIN SERPL BCP-MCNC: 4.4 G/DL (ref 3.4–5)
ALP SERPL-CCNC: 56 U/L (ref 33–136)
ALT SERPL W P-5'-P-CCNC: 21 U/L (ref 10–52)
ANION GAP SERPL CALC-SCNC: 12 MMOL/L (ref 10–20)
AST SERPL W P-5'-P-CCNC: 21 U/L (ref 9–39)
BASOPHILS # BLD AUTO: 0.04 X10*3/UL (ref 0–0.1)
BASOPHILS NFR BLD AUTO: 0.3 %
BILIRUB SERPL-MCNC: 0.6 MG/DL (ref 0–1.2)
BNP SERPL-MCNC: 16 PG/ML (ref 0–99)
BUN SERPL-MCNC: 25 MG/DL (ref 6–23)
CALCIUM SERPL-MCNC: 9.3 MG/DL (ref 8.6–10.3)
CARDIAC TROPONIN I PNL SERPL HS: 4 NG/L (ref 0–20)
CARDIAC TROPONIN I PNL SERPL HS: 5 NG/L (ref 0–20)
CHLORIDE SERPL-SCNC: 101 MMOL/L (ref 98–107)
CO2 SERPL-SCNC: 28 MMOL/L (ref 21–32)
CREAT SERPL-MCNC: 1.77 MG/DL (ref 0.5–1.3)
EGFRCR SERPLBLD CKD-EPI 2021: 40 ML/MIN/1.73M*2
EOSINOPHIL # BLD AUTO: 0.06 X10*3/UL (ref 0–0.4)
EOSINOPHIL NFR BLD AUTO: 0.5 %
ERYTHROCYTE [DISTWIDTH] IN BLOOD BY AUTOMATED COUNT: 12.2 % (ref 11.5–14.5)
FLUAV RNA RESP QL NAA+PROBE: NOT DETECTED
FLUBV RNA RESP QL NAA+PROBE: NOT DETECTED
GLUCOSE SERPL-MCNC: 176 MG/DL (ref 74–99)
HCT VFR BLD AUTO: 42 % (ref 41–52)
HGB BLD-MCNC: 13.9 G/DL (ref 13.5–17.5)
IMM GRANULOCYTES # BLD AUTO: 0.06 X10*3/UL (ref 0–0.5)
IMM GRANULOCYTES NFR BLD AUTO: 0.5 % (ref 0–0.9)
LYMPHOCYTES # BLD AUTO: 0.63 X10*3/UL (ref 0.8–3)
LYMPHOCYTES NFR BLD AUTO: 5.3 %
MCH RBC QN AUTO: 30.9 PG (ref 26–34)
MCHC RBC AUTO-ENTMCNC: 33.1 G/DL (ref 32–36)
MCV RBC AUTO: 93 FL (ref 80–100)
MONOCYTES # BLD AUTO: 0.5 X10*3/UL (ref 0.05–0.8)
MONOCYTES NFR BLD AUTO: 4.2 %
NEUTROPHILS # BLD AUTO: 10.6 X10*3/UL (ref 1.6–5.5)
NEUTROPHILS NFR BLD AUTO: 89.2 %
NRBC BLD-RTO: 0 /100 WBCS (ref 0–0)
PLATELET # BLD AUTO: 144 X10*3/UL (ref 150–450)
POTASSIUM SERPL-SCNC: 4.2 MMOL/L (ref 3.5–5.3)
PROT SERPL-MCNC: 7 G/DL (ref 6.4–8.2)
RBC # BLD AUTO: 4.5 X10*6/UL (ref 4.5–5.9)
SARS-COV-2 RNA RESP QL NAA+PROBE: NOT DETECTED
SODIUM SERPL-SCNC: 137 MMOL/L (ref 136–145)
WBC # BLD AUTO: 11.9 X10*3/UL (ref 4.4–11.3)

## 2025-02-17 PROCEDURE — 73700 CT LOWER EXTREMITY W/O DYE: CPT | Mod: RT

## 2025-02-17 PROCEDURE — 2500000001 HC RX 250 WO HCPCS SELF ADMINISTERED DRUGS (ALT 637 FOR MEDICARE OP): Performed by: STUDENT IN AN ORGANIZED HEALTH CARE EDUCATION/TRAINING PROGRAM

## 2025-02-17 PROCEDURE — 74177 CT ABD & PELVIS W/CONTRAST: CPT

## 2025-02-17 PROCEDURE — 73610 X-RAY EXAM OF ANKLE: CPT | Mod: RIGHT SIDE | Performed by: RADIOLOGY

## 2025-02-17 PROCEDURE — 73700 CT LOWER EXTREMITY W/O DYE: CPT | Mod: RIGHT SIDE | Performed by: SURGERY

## 2025-02-17 PROCEDURE — 99285 EMERGENCY DEPT VISIT HI MDM: CPT | Mod: 25 | Performed by: STUDENT IN AN ORGANIZED HEALTH CARE EDUCATION/TRAINING PROGRAM

## 2025-02-17 PROCEDURE — 73610 X-RAY EXAM OF ANKLE: CPT | Mod: RT

## 2025-02-17 PROCEDURE — 71045 X-RAY EXAM CHEST 1 VIEW: CPT | Performed by: RADIOLOGY

## 2025-02-17 PROCEDURE — 70450 CT HEAD/BRAIN W/O DYE: CPT

## 2025-02-17 PROCEDURE — 2550000001 HC RX 255 CONTRASTS: Performed by: STUDENT IN AN ORGANIZED HEALTH CARE EDUCATION/TRAINING PROGRAM

## 2025-02-17 PROCEDURE — 93005 ELECTROCARDIOGRAM TRACING: CPT

## 2025-02-17 PROCEDURE — 70450 CT HEAD/BRAIN W/O DYE: CPT | Performed by: SURGERY

## 2025-02-17 PROCEDURE — 72125 CT NECK SPINE W/O DYE: CPT

## 2025-02-17 PROCEDURE — 74177 CT ABD & PELVIS W/CONTRAST: CPT | Performed by: SURGERY

## 2025-02-17 PROCEDURE — 96374 THER/PROPH/DIAG INJ IV PUSH: CPT | Mod: 59

## 2025-02-17 PROCEDURE — 2500000004 HC RX 250 GENERAL PHARMACY W/ HCPCS (ALT 636 FOR OP/ED): Performed by: STUDENT IN AN ORGANIZED HEALTH CARE EDUCATION/TRAINING PROGRAM

## 2025-02-17 PROCEDURE — 83880 ASSAY OF NATRIURETIC PEPTIDE: CPT | Performed by: STUDENT IN AN ORGANIZED HEALTH CARE EDUCATION/TRAINING PROGRAM

## 2025-02-17 PROCEDURE — 87636 SARSCOV2 & INF A&B AMP PRB: CPT | Performed by: STUDENT IN AN ORGANIZED HEALTH CARE EDUCATION/TRAINING PROGRAM

## 2025-02-17 PROCEDURE — 84484 ASSAY OF TROPONIN QUANT: CPT | Performed by: STUDENT IN AN ORGANIZED HEALTH CARE EDUCATION/TRAINING PROGRAM

## 2025-02-17 PROCEDURE — 99285 EMERGENCY DEPT VISIT HI MDM: CPT | Performed by: STUDENT IN AN ORGANIZED HEALTH CARE EDUCATION/TRAINING PROGRAM

## 2025-02-17 PROCEDURE — 72125 CT NECK SPINE W/O DYE: CPT | Performed by: SURGERY

## 2025-02-17 PROCEDURE — 80053 COMPREHEN METABOLIC PANEL: CPT | Performed by: STUDENT IN AN ORGANIZED HEALTH CARE EDUCATION/TRAINING PROGRAM

## 2025-02-17 PROCEDURE — 36415 COLL VENOUS BLD VENIPUNCTURE: CPT | Performed by: STUDENT IN AN ORGANIZED HEALTH CARE EDUCATION/TRAINING PROGRAM

## 2025-02-17 PROCEDURE — 73630 X-RAY EXAM OF FOOT: CPT | Mod: RT

## 2025-02-17 PROCEDURE — 73630 X-RAY EXAM OF FOOT: CPT | Mod: RIGHT SIDE | Performed by: RADIOLOGY

## 2025-02-17 PROCEDURE — 29515 APPLICATION SHORT LEG SPLINT: CPT | Mod: RT

## 2025-02-17 PROCEDURE — 71045 X-RAY EXAM CHEST 1 VIEW: CPT

## 2025-02-17 PROCEDURE — 85025 COMPLETE CBC W/AUTO DIFF WBC: CPT | Performed by: STUDENT IN AN ORGANIZED HEALTH CARE EDUCATION/TRAINING PROGRAM

## 2025-02-17 RX ORDER — ACETAMINOPHEN 325 MG/1
975 TABLET ORAL ONCE
Status: COMPLETED | OUTPATIENT
Start: 2025-02-17 | End: 2025-02-17

## 2025-02-17 RX ORDER — FENTANYL CITRATE 50 UG/ML
25 INJECTION, SOLUTION INTRAMUSCULAR; INTRAVENOUS ONCE
Status: COMPLETED | OUTPATIENT
Start: 2025-02-17 | End: 2025-02-17

## 2025-02-17 RX ORDER — OXYCODONE AND ACETAMINOPHEN 5; 325 MG/1; MG/1
1 TABLET ORAL ONCE
Status: COMPLETED | OUTPATIENT
Start: 2025-02-17 | End: 2025-02-17

## 2025-02-17 RX ADMIN — IOHEXOL 75 ML: 350 INJECTION, SOLUTION INTRAVENOUS at 22:48

## 2025-02-17 RX ADMIN — OXYCODONE HYDROCHLORIDE AND ACETAMINOPHEN 1 TABLET: 5; 325 TABLET ORAL at 23:49

## 2025-02-17 RX ADMIN — SODIUM CHLORIDE 1000 ML: 9 INJECTION, SOLUTION INTRAVENOUS at 22:31

## 2025-02-17 RX ADMIN — FENTANYL CITRATE 25 MCG: 50 INJECTION, SOLUTION INTRAMUSCULAR; INTRAVENOUS at 22:38

## 2025-02-17 RX ADMIN — ACETAMINOPHEN 975 MG: 325 TABLET ORAL at 22:39

## 2025-02-17 ASSESSMENT — PAIN SCALES - GENERAL
PAINLEVEL_OUTOF10: 5 - MODERATE PAIN
PAINLEVEL_OUTOF10: 0 - NO PAIN
PAINLEVEL_OUTOF10: 5 - MODERATE PAIN
PAINLEVEL_OUTOF10: 5 - MODERATE PAIN
PAINLEVEL_OUTOF10: 0 - NO PAIN
PAINLEVEL_OUTOF10: 8
PAINLEVEL_OUTOF10: 9

## 2025-02-17 ASSESSMENT — LIFESTYLE VARIABLES
EVER HAD A DRINK FIRST THING IN THE MORNING TO STEADY YOUR NERVES TO GET RID OF A HANGOVER: NO
HAVE YOU EVER FELT YOU SHOULD CUT DOWN ON YOUR DRINKING: NO
EVER FELT BAD OR GUILTY ABOUT YOUR DRINKING: NO
HAVE PEOPLE ANNOYED YOU BY CRITICIZING YOUR DRINKING: NO
TOTAL SCORE: 0

## 2025-02-17 ASSESSMENT — COLUMBIA-SUICIDE SEVERITY RATING SCALE - C-SSRS
1. IN THE PAST MONTH, HAVE YOU WISHED YOU WERE DEAD OR WISHED YOU COULD GO TO SLEEP AND NOT WAKE UP?: NO
2. HAVE YOU ACTUALLY HAD ANY THOUGHTS OF KILLING YOURSELF?: NO
6. HAVE YOU EVER DONE ANYTHING, STARTED TO DO ANYTHING, OR PREPARED TO DO ANYTHING TO END YOUR LIFE?: NO

## 2025-02-17 ASSESSMENT — PAIN DESCRIPTION - PAIN TYPE: TYPE: ACUTE PAIN

## 2025-02-17 ASSESSMENT — PAIN DESCRIPTION - LOCATION
LOCATION: ANKLE
LOCATION: ANKLE

## 2025-02-17 ASSESSMENT — PAIN - FUNCTIONAL ASSESSMENT: PAIN_FUNCTIONAL_ASSESSMENT: 0-10

## 2025-02-17 ASSESSMENT — PAIN DESCRIPTION - ORIENTATION
ORIENTATION: RIGHT
ORIENTATION: RIGHT

## 2025-02-17 ASSESSMENT — PAIN DESCRIPTION - DESCRIPTORS: DESCRIPTORS: ACHING

## 2025-02-18 ENCOUNTER — APPOINTMENT (OUTPATIENT)
Dept: RADIOLOGY | Facility: HOSPITAL | Age: 75
DRG: 493 | End: 2025-02-18
Payer: MEDICARE

## 2025-02-18 ENCOUNTER — APPOINTMENT (OUTPATIENT)
Dept: CARDIOLOGY | Facility: HOSPITAL | Age: 75
DRG: 493 | End: 2025-02-18
Payer: MEDICARE

## 2025-02-18 ENCOUNTER — ANESTHESIA (OUTPATIENT)
Dept: OPERATING ROOM | Facility: HOSPITAL | Age: 75
End: 2025-02-18
Payer: MEDICARE

## 2025-02-18 ENCOUNTER — APPOINTMENT (OUTPATIENT)
Dept: PRIMARY CARE | Facility: CLINIC | Age: 75
End: 2025-02-18
Payer: MEDICARE

## 2025-02-18 ENCOUNTER — ANESTHESIA EVENT (OUTPATIENT)
Dept: OPERATING ROOM | Facility: HOSPITAL | Age: 75
End: 2025-02-18
Payer: MEDICARE

## 2025-02-18 ENCOUNTER — DOCUMENTATION (OUTPATIENT)
Dept: ORTHOPEDIC SURGERY | Facility: HOSPITAL | Age: 75
End: 2025-02-18

## 2025-02-18 PROBLEM — R55 SYNCOPE: Status: ACTIVE | Noted: 2025-02-18

## 2025-02-18 PROBLEM — S82.891A CLOSED FRACTURE OF RIGHT ANKLE, INITIAL ENCOUNTER: Status: ACTIVE | Noted: 2025-02-18

## 2025-02-18 LAB
ALBUMIN SERPL BCP-MCNC: 3.9 G/DL (ref 3.4–5)
ANION GAP SERPL CALC-SCNC: 12 MMOL/L (ref 10–20)
AORTIC VALVE MEAN GRADIENT: 15 MMHG
AORTIC VALVE PEAK VELOCITY: 2.83 M/S
ATRIAL RATE: 97 BPM
AV PEAK GRADIENT: 32 MMHG
AVA (PEAK VEL): 1.37 CM2
AVA (VTI): 1.25 CM2
BUN SERPL-MCNC: 21 MG/DL (ref 6–23)
CALCIUM SERPL-MCNC: 8.5 MG/DL (ref 8.6–10.3)
CARDIAC TROPONIN I PNL SERPL HS: 4 NG/L (ref 0–20)
CHLORIDE SERPL-SCNC: 106 MMOL/L (ref 98–107)
CO2 SERPL-SCNC: 23 MMOL/L (ref 21–32)
CREAT SERPL-MCNC: 1.43 MG/DL (ref 0.5–1.3)
EGFRCR SERPLBLD CKD-EPI 2021: 51 ML/MIN/1.73M*2
EJECTION FRACTION APICAL 4 CHAMBER: 60.5
EJECTION FRACTION: 63 %
ERYTHROCYTE [DISTWIDTH] IN BLOOD BY AUTOMATED COUNT: 12.4 % (ref 11.5–14.5)
GLUCOSE SERPL-MCNC: 128 MG/DL (ref 74–99)
HCT VFR BLD AUTO: 41.1 % (ref 41–52)
HEMOCCULT SP1 STL QL: NEGATIVE
HGB BLD-MCNC: 13.2 G/DL (ref 13.5–17.5)
LEFT ATRIUM VOLUME AREA LENGTH INDEX BSA: 20.6 ML/M2
LEFT VENTRICLE INTERNAL DIMENSION DIASTOLE: 4.7 CM (ref 3.5–6)
LEFT VENTRICULAR OUTFLOW TRACT DIAMETER: 2.2 CM
LV EJECTION FRACTION BIPLANE: 60 %
MAGNESIUM SERPL-MCNC: 2.22 MG/DL (ref 1.6–2.4)
MCH RBC QN AUTO: 30.7 PG (ref 26–34)
MCHC RBC AUTO-ENTMCNC: 32.1 G/DL (ref 32–36)
MCV RBC AUTO: 96 FL (ref 80–100)
MITRAL VALVE E/A RATIO: 0.69
NRBC BLD-RTO: 0 /100 WBCS (ref 0–0)
P AXIS: 36 DEGREES
P OFFSET: 216 MS
P ONSET: 166 MS
PHOSPHATE SERPL-MCNC: 4.2 MG/DL (ref 2.5–4.9)
PLATELET # BLD AUTO: 121 X10*3/UL (ref 150–450)
POTASSIUM SERPL-SCNC: 3.9 MMOL/L (ref 3.5–5.3)
PR INTERVAL: 126 MS
Q ONSET: 229 MS
QRS COUNT: 16 BEATS
QRS DURATION: 74 MS
QT INTERVAL: 360 MS
QTC CALCULATION(BAZETT): 457 MS
QTC FREDERICIA: 422 MS
R AXIS: -4 DEGREES
RBC # BLD AUTO: 4.3 X10*6/UL (ref 4.5–5.9)
RIGHT VENTRICLE FREE WALL PEAK S': 22.8 CM/S
RIGHT VENTRICLE PEAK SYSTOLIC PRESSURE: 33.7 MMHG
SODIUM SERPL-SCNC: 137 MMOL/L (ref 136–145)
T AXIS: -31 DEGREES
T OFFSET: 409 MS
TRICUSPID ANNULAR PLANE SYSTOLIC EXCURSION: 2.5 CM
VENTRICULAR RATE: 97 BPM
WBC # BLD AUTO: 8.4 X10*3/UL (ref 4.4–11.3)

## 2025-02-18 PROCEDURE — 80069 RENAL FUNCTION PANEL: CPT

## 2025-02-18 PROCEDURE — 87506 IADNA-DNA/RNA PROBE TQ 6-11: CPT | Mod: STJLAB

## 2025-02-18 PROCEDURE — 99223 1ST HOSP IP/OBS HIGH 75: CPT

## 2025-02-18 PROCEDURE — 84484 ASSAY OF TROPONIN QUANT: CPT

## 2025-02-18 PROCEDURE — 85027 COMPLETE CBC AUTOMATED: CPT

## 2025-02-18 PROCEDURE — 93308 TTE F-UP OR LMTD: CPT

## 2025-02-18 PROCEDURE — 36415 COLL VENOUS BLD VENIPUNCTURE: CPT

## 2025-02-18 PROCEDURE — 93308 TTE F-UP OR LMTD: CPT | Performed by: INTERNAL MEDICINE

## 2025-02-18 PROCEDURE — 2500000004 HC RX 250 GENERAL PHARMACY W/ HCPCS (ALT 636 FOR OP/ED)

## 2025-02-18 PROCEDURE — 2500000001 HC RX 250 WO HCPCS SELF ADMINISTERED DRUGS (ALT 637 FOR MEDICARE OP)

## 2025-02-18 PROCEDURE — 96372 THER/PROPH/DIAG INJ SC/IM: CPT

## 2025-02-18 PROCEDURE — 1200000002 HC GENERAL ROOM WITH TELEMETRY DAILY

## 2025-02-18 PROCEDURE — 2500000002 HC RX 250 W HCPCS SELF ADMINISTERED DRUGS (ALT 637 FOR MEDICARE OP, ALT 636 FOR OP/ED)

## 2025-02-18 PROCEDURE — 82270 OCCULT BLOOD FECES: CPT

## 2025-02-18 PROCEDURE — 87493 C DIFF AMPLIFIED PROBE: CPT | Mod: STJLAB

## 2025-02-18 PROCEDURE — 2550000001 HC RX 255 CONTRASTS: Performed by: STUDENT IN AN ORGANIZED HEALTH CARE EDUCATION/TRAINING PROGRAM

## 2025-02-18 PROCEDURE — 71275 CT ANGIOGRAPHY CHEST: CPT | Performed by: STUDENT IN AN ORGANIZED HEALTH CARE EDUCATION/TRAINING PROGRAM

## 2025-02-18 PROCEDURE — 71275 CT ANGIOGRAPHY CHEST: CPT

## 2025-02-18 PROCEDURE — 83735 ASSAY OF MAGNESIUM: CPT

## 2025-02-18 RX ORDER — AMLODIPINE BESYLATE 2.5 MG/1
2.5 TABLET ORAL DAILY
Status: DISCONTINUED | OUTPATIENT
Start: 2025-02-18 | End: 2025-02-19

## 2025-02-18 RX ORDER — SODIUM CHLORIDE, SODIUM LACTATE, POTASSIUM CHLORIDE, CALCIUM CHLORIDE 600; 310; 30; 20 MG/100ML; MG/100ML; MG/100ML; MG/100ML
100 INJECTION, SOLUTION INTRAVENOUS CONTINUOUS
Status: DISCONTINUED | OUTPATIENT
Start: 2025-02-18 | End: 2025-02-18

## 2025-02-18 RX ORDER — ACETAMINOPHEN 160 MG/5ML
650 SOLUTION ORAL EVERY 4 HOURS PRN
Status: DISCONTINUED | OUTPATIENT
Start: 2025-02-18 | End: 2025-02-21 | Stop reason: HOSPADM

## 2025-02-18 RX ORDER — ACETAMINOPHEN 650 MG/1
650 SUPPOSITORY RECTAL EVERY 4 HOURS PRN
Status: DISCONTINUED | OUTPATIENT
Start: 2025-02-18 | End: 2025-02-21 | Stop reason: HOSPADM

## 2025-02-18 RX ORDER — SODIUM CHLORIDE 9 MG/ML
100 INJECTION, SOLUTION INTRAVENOUS CONTINUOUS
Status: ACTIVE | OUTPATIENT
Start: 2025-02-18 | End: 2025-02-19

## 2025-02-18 RX ORDER — TAMSULOSIN HYDROCHLORIDE 0.4 MG/1
0.4 CAPSULE ORAL NIGHTLY
Status: DISCONTINUED | OUTPATIENT
Start: 2025-02-18 | End: 2025-02-21 | Stop reason: HOSPADM

## 2025-02-18 RX ORDER — AMLODIPINE BESYLATE 2.5 MG/1
2.5 TABLET ORAL DAILY
Status: DISCONTINUED | OUTPATIENT
Start: 2025-02-19 | End: 2025-02-18

## 2025-02-18 RX ORDER — HEPARIN SODIUM 5000 [USP'U]/ML
5000 INJECTION, SOLUTION INTRAVENOUS; SUBCUTANEOUS EVERY 8 HOURS SCHEDULED
Status: DISCONTINUED | OUTPATIENT
Start: 2025-02-18 | End: 2025-02-21 | Stop reason: HOSPADM

## 2025-02-18 RX ORDER — ACETAMINOPHEN 325 MG/1
650 TABLET ORAL EVERY 4 HOURS PRN
Status: DISCONTINUED | OUTPATIENT
Start: 2025-02-18 | End: 2025-02-21 | Stop reason: HOSPADM

## 2025-02-18 RX ORDER — PANTOPRAZOLE SODIUM 40 MG/10ML
40 INJECTION, POWDER, LYOPHILIZED, FOR SOLUTION INTRAVENOUS 2 TIMES DAILY
Status: DISCONTINUED | OUTPATIENT
Start: 2025-02-18 | End: 2025-02-20

## 2025-02-18 RX ORDER — OXYCODONE HYDROCHLORIDE 5 MG/1
5 TABLET ORAL EVERY 6 HOURS PRN
Status: DISCONTINUED | OUTPATIENT
Start: 2025-02-18 | End: 2025-02-21 | Stop reason: HOSPADM

## 2025-02-18 RX ORDER — SODIUM CHLORIDE 9 MG/ML
100 INJECTION, SOLUTION INTRAVENOUS CONTINUOUS
Status: DISCONTINUED | OUTPATIENT
Start: 2025-02-18 | End: 2025-02-18

## 2025-02-18 RX ADMIN — HEPARIN SODIUM 5000 UNITS: 5000 INJECTION, SOLUTION INTRAVENOUS; SUBCUTANEOUS at 06:20

## 2025-02-18 RX ADMIN — ACETAMINOPHEN 650 MG: 325 TABLET ORAL at 14:38

## 2025-02-18 RX ADMIN — AMLODIPINE BESYLATE 2.5 MG: 2.5 TABLET ORAL at 21:00

## 2025-02-18 RX ADMIN — TAMSULOSIN HYDROCHLORIDE 0.4 MG: 0.4 CAPSULE ORAL at 21:00

## 2025-02-18 RX ADMIN — SODIUM CHLORIDE, POTASSIUM CHLORIDE, SODIUM LACTATE AND CALCIUM CHLORIDE 100 ML/HR: 600; 310; 30; 20 INJECTION, SOLUTION INTRAVENOUS at 03:31

## 2025-02-18 RX ADMIN — SODIUM CHLORIDE 100 ML/HR: 9 INJECTION, SOLUTION INTRAVENOUS at 23:03

## 2025-02-18 RX ADMIN — HEPARIN SODIUM 5000 UNITS: 5000 INJECTION, SOLUTION INTRAVENOUS; SUBCUTANEOUS at 18:03

## 2025-02-18 RX ADMIN — IOHEXOL 60 ML: 350 INJECTION, SOLUTION INTRAVENOUS at 19:38

## 2025-02-18 RX ADMIN — SODIUM CHLORIDE 100 ML/HR: 9 INJECTION, SOLUTION INTRAVENOUS at 16:39

## 2025-02-18 RX ADMIN — PANTOPRAZOLE SODIUM 40 MG: 40 INJECTION, POWDER, FOR SOLUTION INTRAVENOUS at 21:00

## 2025-02-18 SDOH — HEALTH STABILITY: MENTAL HEALTH: HOW OFTEN DO YOU HAVE SIX OR MORE DRINKS ON ONE OCCASION?: NEVER

## 2025-02-18 SDOH — ECONOMIC STABILITY: INCOME INSECURITY: IN THE PAST 12 MONTHS HAS THE ELECTRIC, GAS, OIL, OR WATER COMPANY THREATENED TO SHUT OFF SERVICES IN YOUR HOME?: NO

## 2025-02-18 SDOH — ECONOMIC STABILITY: TRANSPORTATION INSECURITY: IN THE PAST 12 MONTHS, HAS LACK OF TRANSPORTATION KEPT YOU FROM MEDICAL APPOINTMENTS OR FROM GETTING MEDICATIONS?: NO

## 2025-02-18 SDOH — SOCIAL STABILITY: SOCIAL INSECURITY: HAVE YOU HAD ANY THOUGHTS OF HARMING ANYONE ELSE?: NO

## 2025-02-18 SDOH — ECONOMIC STABILITY: FOOD INSECURITY: WITHIN THE PAST 12 MONTHS, THE FOOD YOU BOUGHT JUST DIDN'T LAST AND YOU DIDN'T HAVE MONEY TO GET MORE.: NEVER TRUE

## 2025-02-18 SDOH — SOCIAL STABILITY: SOCIAL INSECURITY: WITHIN THE LAST YEAR, HAVE YOU BEEN AFRAID OF YOUR PARTNER OR EX-PARTNER?: NO

## 2025-02-18 SDOH — HEALTH STABILITY: PHYSICAL HEALTH: ON AVERAGE, HOW MANY DAYS PER WEEK DO YOU ENGAGE IN MODERATE TO STRENUOUS EXERCISE (LIKE A BRISK WALK)?: 5 DAYS

## 2025-02-18 SDOH — SOCIAL STABILITY: SOCIAL INSECURITY
WITHIN THE LAST YEAR, HAVE YOU BEEN KICKED, HIT, SLAPPED, OR OTHERWISE PHYSICALLY HURT BY YOUR PARTNER OR EX-PARTNER?: NO

## 2025-02-18 SDOH — SOCIAL STABILITY: SOCIAL INSECURITY: WITHIN THE LAST YEAR, HAVE YOU BEEN HUMILIATED OR EMOTIONALLY ABUSED IN OTHER WAYS BY YOUR PARTNER OR EX-PARTNER?: NO

## 2025-02-18 SDOH — ECONOMIC STABILITY: HOUSING INSECURITY: AT ANY TIME IN THE PAST 12 MONTHS, WERE YOU HOMELESS OR LIVING IN A SHELTER (INCLUDING NOW)?: NO

## 2025-02-18 SDOH — HEALTH STABILITY: MENTAL HEALTH: HOW OFTEN DO YOU HAVE A DRINK CONTAINING ALCOHOL?: NEVER

## 2025-02-18 SDOH — HEALTH STABILITY: MENTAL HEALTH: HOW OFTEN DO YOU HAVE A DRINK CONTAINING ALCOHOL?: MONTHLY OR LESS

## 2025-02-18 SDOH — HEALTH STABILITY: PHYSICAL HEALTH
HOW OFTEN DO YOU NEED TO HAVE SOMEONE HELP YOU WHEN YOU READ INSTRUCTIONS, PAMPHLETS, OR OTHER WRITTEN MATERIAL FROM YOUR DOCTOR OR PHARMACY?: NEVER

## 2025-02-18 SDOH — HEALTH STABILITY: PHYSICAL HEALTH: ON AVERAGE, HOW MANY MINUTES DO YOU ENGAGE IN EXERCISE AT THIS LEVEL?: 60 MIN

## 2025-02-18 SDOH — ECONOMIC STABILITY: FOOD INSECURITY: WITHIN THE PAST 12 MONTHS, YOU WORRIED THAT YOUR FOOD WOULD RUN OUT BEFORE YOU GOT THE MONEY TO BUY MORE.: NEVER TRUE

## 2025-02-18 SDOH — SOCIAL STABILITY: SOCIAL INSECURITY: ARE YOU OR HAVE YOU BEEN THREATENED OR ABUSED PHYSICALLY, EMOTIONALLY, OR SEXUALLY BY ANYONE?: NO

## 2025-02-18 SDOH — SOCIAL STABILITY: SOCIAL INSECURITY: WERE YOU ABLE TO COMPLETE ALL THE BEHAVIORAL HEALTH SCREENINGS?: YES

## 2025-02-18 SDOH — HEALTH STABILITY: MENTAL HEALTH: HOW MANY DRINKS CONTAINING ALCOHOL DO YOU HAVE ON A TYPICAL DAY WHEN YOU ARE DRINKING?: 1 OR 2

## 2025-02-18 SDOH — ECONOMIC STABILITY: HOUSING INSECURITY: IN THE LAST 12 MONTHS, WAS THERE A TIME WHEN YOU WERE NOT ABLE TO PAY THE MORTGAGE OR RENT ON TIME?: NO

## 2025-02-18 SDOH — SOCIAL STABILITY: SOCIAL INSECURITY
WITHIN THE LAST YEAR, HAVE YOU BEEN RAPED OR FORCED TO HAVE ANY KIND OF SEXUAL ACTIVITY BY YOUR PARTNER OR EX-PARTNER?: NO

## 2025-02-18 SDOH — ECONOMIC STABILITY: HOUSING INSECURITY: IN THE PAST 12 MONTHS, HOW MANY TIMES HAVE YOU MOVED WHERE YOU WERE LIVING?: 0

## 2025-02-18 SDOH — SOCIAL STABILITY: SOCIAL INSECURITY: DO YOU FEEL UNSAFE GOING BACK TO THE PLACE WHERE YOU ARE LIVING?: NO

## 2025-02-18 SDOH — SOCIAL STABILITY: SOCIAL INSECURITY: HAS ANYONE EVER THREATENED TO HURT YOUR FAMILY OR YOUR PETS?: NO

## 2025-02-18 SDOH — ECONOMIC STABILITY: FOOD INSECURITY: HOW HARD IS IT FOR YOU TO PAY FOR THE VERY BASICS LIKE FOOD, HOUSING, MEDICAL CARE, AND HEATING?: NOT HARD AT ALL

## 2025-02-18 SDOH — SOCIAL STABILITY: SOCIAL INSECURITY: ABUSE: ADULT

## 2025-02-18 SDOH — SOCIAL STABILITY: SOCIAL INSECURITY: HAVE YOU HAD THOUGHTS OF HARMING ANYONE ELSE?: NO

## 2025-02-18 SDOH — SOCIAL STABILITY: SOCIAL INSECURITY: ARE THERE ANY APPARENT SIGNS OF INJURIES/BEHAVIORS THAT COULD BE RELATED TO ABUSE/NEGLECT?: NO

## 2025-02-18 SDOH — HEALTH STABILITY: MENTAL HEALTH: HOW MANY DRINKS CONTAINING ALCOHOL DO YOU HAVE ON A TYPICAL DAY WHEN YOU ARE DRINKING?: PATIENT DOES NOT DRINK

## 2025-02-18 SDOH — SOCIAL STABILITY: SOCIAL INSECURITY: DO YOU FEEL ANYONE HAS EXPLOITED OR TAKEN ADVANTAGE OF YOU FINANCIALLY OR OF YOUR PERSONAL PROPERTY?: NO

## 2025-02-18 SDOH — SOCIAL STABILITY: SOCIAL INSECURITY: DOES ANYONE TRY TO KEEP YOU FROM HAVING/CONTACTING OTHER FRIENDS OR DOING THINGS OUTSIDE YOUR HOME?: NO

## 2025-02-18 ASSESSMENT — COGNITIVE AND FUNCTIONAL STATUS - GENERAL
PATIENT BASELINE BEDBOUND: NO
MOBILITY SCORE: 16
TOILETING: A LOT
STANDING UP FROM CHAIR USING ARMS: A LOT
PERSONAL GROOMING: A LOT
WALKING IN HOSPITAL ROOM: A LOT
WALKING IN HOSPITAL ROOM: TOTAL
CLIMB 3 TO 5 STEPS WITH RAILING: TOTAL
DAILY ACTIVITIY SCORE: 14
CLIMB 3 TO 5 STEPS WITH RAILING: A LOT
TOILETING: A LITTLE
HELP NEEDED FOR BATHING: A LOT
MOVING TO AND FROM BED TO CHAIR: A LOT
DAILY ACTIVITIY SCORE: 23
MOVING FROM LYING ON BACK TO SITTING ON SIDE OF FLAT BED WITH BEDRAILS: A LITTLE
MOBILITY SCORE: 13
DRESSING REGULAR LOWER BODY CLOTHING: A LOT
DRESSING REGULAR UPPER BODY CLOTHING: A LOT
STANDING UP FROM CHAIR USING ARMS: A LOT
TURNING FROM BACK TO SIDE WHILE IN FLAT BAD: A LITTLE
MOVING TO AND FROM BED TO CHAIR: A LITTLE

## 2025-02-18 ASSESSMENT — ACTIVITIES OF DAILY LIVING (ADL)
ADEQUATE_TO_COMPLETE_ADL: YES
HEARING - RIGHT EAR: FUNCTIONAL
HEARING - LEFT EAR: FUNCTIONAL
WALKS IN HOME: INDEPENDENT
PATIENT'S MEMORY ADEQUATE TO SAFELY COMPLETE DAILY ACTIVITIES?: YES
TOILETING: INDEPENDENT
GROOMING: INDEPENDENT
JUDGMENT_ADEQUATE_SAFELY_COMPLETE_DAILY_ACTIVITIES: YES
LACK_OF_TRANSPORTATION: NO
DRESSING YOURSELF: INDEPENDENT
LACK_OF_TRANSPORTATION: NO
BATHING: INDEPENDENT
FEEDING YOURSELF: INDEPENDENT

## 2025-02-18 ASSESSMENT — PAIN SCALES - GENERAL
PAINLEVEL_OUTOF10: 0 - NO PAIN
PAINLEVEL_OUTOF10: 5 - MODERATE PAIN
PAINLEVEL_OUTOF10: 0 - NO PAIN
PAINLEVEL_OUTOF10: 3
PAINLEVEL_OUTOF10: 0 - NO PAIN
PAINLEVEL_OUTOF10: 0 - NO PAIN

## 2025-02-18 ASSESSMENT — ENCOUNTER SYMPTOMS
DIZZINESS: 0
PALPITATIONS: 0
SHORTNESS OF BREATH: 0
DIARRHEA: 1
ABDOMINAL PAIN: 0
WHEEZING: 0
DIAPHORESIS: 1

## 2025-02-18 ASSESSMENT — LIFESTYLE VARIABLES
AUDIT-C TOTAL SCORE: 2
HOW OFTEN DO YOU HAVE A DRINK CONTAINING ALCOHOL: MONTHLY OR LESS
AUDIT-C TOTAL SCORE: 0
AUDIT-C TOTAL SCORE: 1
HOW OFTEN DO YOU HAVE 6 OR MORE DRINKS ON ONE OCCASION: NEVER
SKIP TO QUESTIONS 9-10: 1
AUDIT-C TOTAL SCORE: 0
HOW OFTEN DO YOU HAVE 6 OR MORE DRINKS ON ONE OCCASION: LESS THAN MONTHLY
SKIP TO QUESTIONS 9-10: 1
AUDIT-C TOTAL SCORE: 2
AUDIT-C TOTAL SCORE: 0
HOW OFTEN DO YOU HAVE A DRINK CONTAINING ALCOHOL: NEVER
HOW MANY STANDARD DRINKS CONTAINING ALCOHOL DO YOU HAVE ON A TYPICAL DAY: PATIENT DOES NOT DRINK
HOW MANY STANDARD DRINKS CONTAINING ALCOHOL DO YOU HAVE ON A TYPICAL DAY: PATIENT DOES NOT DRINK
SKIP TO QUESTIONS 9-10: 1
SKIP TO QUESTIONS 9-10: 0

## 2025-02-18 ASSESSMENT — PAIN - FUNCTIONAL ASSESSMENT: PAIN_FUNCTIONAL_ASSESSMENT: 0-10

## 2025-02-18 ASSESSMENT — PATIENT HEALTH QUESTIONNAIRE - PHQ9
1. LITTLE INTEREST OR PLEASURE IN DOING THINGS: NOT AT ALL
2. FEELING DOWN, DEPRESSED OR HOPELESS: NOT AT ALL
SUM OF ALL RESPONSES TO PHQ9 QUESTIONS 1 & 2: 0

## 2025-02-18 ASSESSMENT — PAIN DESCRIPTION - LOCATION: LOCATION: ANKLE

## 2025-02-18 ASSESSMENT — PAIN DESCRIPTION - ORIENTATION: ORIENTATION: RIGHT

## 2025-02-18 NOTE — PROGRESS NOTES
Unable to get clearance for surgery this evening.  Still has pending CT scan of the chest and lungs.  Therefore plan to proceed hopefully tomorrow if OR time becomes available.  If identified not have OR time available tomorrow and a reasonable hour may be likely discharged home with outpatient surgery planned.  Case request updated till tomorrow.  Will make the patient n.p.o. after midnight.  Strict elevation in the interim.    Arsenio Lopes MD

## 2025-02-18 NOTE — PROGRESS NOTES
Consult Note  Patient: Julio Miller  Unit/Bed: Room/bed info not found  YOB: 1950  MRN: [id number]  Acct:    Admitting Diagnosis: No admission diagnoses are documented for this encounter.  Date:  (Not on file)  Hospital Day: 0    Complaint:  Patient presents after a potential syncopal episode with an injury sustained to his ankle when he fell.  The patient presented to the ER.  Orthopedic consultation was obtained and requested earlier today.  The patient is evaluated at bedside.  X-rays are reviewed he is also had a CT scan.  X-rays demonstrate a slightly displaced distal fibula fracture just above the joint line.  The mortise have shifted a millimeter or 2 as a result of which.    History of Present Illness:  As above the patient status post a fall yesterday with pain and discomfort in the ankle and swelling.  He has the inability to ambulate    PMHx:  Past Medical History:   Diagnosis Date    Encounter for general adult medical examination without abnormal findings 03/01/2022    Medicare annual wellness visit, subsequent    Encounter for screening for malignant neoplasm of colon     Screen for colon cancer    Encounter for screening for malignant neoplasm of colon 03/18/2022    Colon cancer screening    Encounter for screening for malignant neoplasm of prostate 10/21/2015    Screening for prostate cancer    Other conditions influencing health status 02/17/2021    History of cough    Palpitations 08/18/2013    Palpitations    Personal history of diseases of the skin and subcutaneous tissue 01/15/2016    History of contact dermatitis    Personal history of other diseases of the respiratory system 02/19/2021    History of nasal discharge    Tinea cruris     Jock itch       PSHx:  Past Surgical History:   Procedure Laterality Date    OTHER SURGICAL HISTORY  04/15/2022    Foot surgery    OTHER SURGICAL HISTORY  04/15/2022    Vasectomy    OTHER SURGICAL HISTORY  04/15/2022    Excision of spermatocele     OTHER SURGICAL HISTORY  04/15/2022    Hemorrhoidectomy simple       Social Hx:  Social History     Socioeconomic History    Marital status:    Tobacco Use    Smoking status: Never    Smokeless tobacco: Never       Family Hx:  Family History   Problem Relation Name Age of Onset    Migraines Mother      Other (Heart palpitations) Mother      Alzheimer's disease Father      Prostatitis Father      Parkinsonism Father         Review of Systems:   Review of Systems  Chart reviewed no previous ankle problems.      Physical Examination:    There were no vitals taken for this visit.   Physical Exam    LABS:  CBC:   Lab Results   Component Value Date    WBC 8.4 02/18/2025    RBC 4.30 (L) 02/18/2025    HGB 13.2 (L) 02/18/2025    HCT 41.1 02/18/2025    MCV 96 02/18/2025    MCH 30.7 02/18/2025    MCHC 32.1 02/18/2025    RDW 12.4 02/18/2025     (L) 02/18/2025     CBC with Differential:    Lab Results   Component Value Date    WBC 8.4 02/18/2025    RBC 4.30 (L) 02/18/2025    HGB 13.2 (L) 02/18/2025    HCT 41.1 02/18/2025     (L) 02/18/2025    MCV 96 02/18/2025    MCH 30.7 02/18/2025    MCHC 32.1 02/18/2025    RDW 12.4 02/18/2025    NRBC 0.0 02/18/2025    LYMPHOPCT 5.3 02/17/2025    MONOPCT 4.2 02/17/2025    EOSPCT 0.5 02/17/2025    BASOPCT 0.3 02/17/2025    MONOSABS 0.50 02/17/2025    LYMPHSABS 0.63 (L) 02/17/2025    EOSABS 0.06 02/17/2025    BASOSABS 0.04 02/17/2025     CMP:    Lab Results   Component Value Date     02/18/2025    K 3.9 02/18/2025     02/18/2025    CO2 23 02/18/2025    BUN 21 02/18/2025    CREATININE 1.43 (H) 02/18/2025    GLUCOSE 128 (H) 02/18/2025    PROT 7.0 02/17/2025    CALCIUM 8.5 (L) 02/18/2025    BILITOT 0.6 02/17/2025    ALKPHOS 56 02/17/2025    AST 21 02/17/2025    ALT 21 02/17/2025     BMP:    Lab Results   Component Value Date     02/18/2025    K 3.9 02/18/2025     02/18/2025    CO2 23 02/18/2025    BUN 21 02/18/2025    CREATININE 1.43 (H) 02/18/2025     "CALCIUM 8.5 (L) 02/18/2025    GLUCOSE 128 (H) 02/18/2025     Magnesium:  Lab Results   Component Value Date    MG 2.22 02/18/2025     Troponin:  No results found for: \"TROPONINI\"    X-rays demonstrate a right ankle fracture.Subjective    Patient ID: Julio Miller is a 74 y.o. male.    Chief Complaint: No chief complaint on file.     Last Surgery: No surgery found  Last Surgery Date: No surgery found    HPI    Objective   Ortho Exam    Patient has a little swelling the ankle the patient has a presently applied splint.  The patient has good cap refill and color distally    Image Results:  ECG 12 lead  Normal sinus rhythm  T wave abnormality, consider inferior ischemia  Abnormal ECG  When compared with ECG of 18-MAY-2010 14:35,  Nonspecific T wave abnormality now evident in Anterolateral leads  CT ankle right wo IV contrast  Narrative: Interpreted By:  Wayne Ceron,   STUDY:  CT ANKLE RIGHT WO IV CONTRAST; ;  2/17/2025 11:20 pm      INDICATION:  Signs/Symptoms:Trauma.          COMPARISON:  None.      ACCESSION NUMBER(S):  [id number]      ORDERING CLINICIAN:  ERIC HUERTA      TECHNIQUE:  Noncontrast CT of the right ankle with multiplanar reformations.      FINDINGS:  Acute mildly comminuted oblique fracture of the lateral malleolus at  and above the level of the syndesmosis. There is lateral displacement  of 1 cortical thickness and associated widening of the medial ankle  mortise.      There are small ossific fragments posterior and medial to the medial  aspect of the navicular bone (series 201, images 85-87), that may  relate to accessory ossicles or displaced avulsion fracture  fragments; please correlate clinically.      Small ossific fragment ventral to the distal tibial epiphysis,  compatible with displaced avulsion fracture (series 204, images 51  and 52).      Tiny ossific fragment seen along the lateral process of the  calcaneus, suspicious for small avulsion fracture (series 203, image  51).      Cortical " indistinctness, lucency, depression and irregularity  involving the medial and lateral anterior aspects of the talar dome  (series 204, images 41-45; 53-56), and also involving the medial  aspect of the distal tibial epiphysis, suspicious for acute impaction  fracture.      ORIF changes to the distal 1st through 3rd metatarsals associated  with healed and remodeled fractures, without definite evidence of  hardware complication.      Soft tissue swelling and edema. No subcutaneous air or foreign body.      Impression: Please see above.          MACRO:  None      Signed by: Wayne Ceron 2/18/2025 12:11 AM  Dictation workstation:   [id number]  CT head wo IV contrast, CT cervical spine wo IV contrast  Narrative: Interpreted By:  Wayne Ceron,   STUDY:  CT HEAD WO IV CONTRAST; CT CERVICAL SPINE WO IV CONTRAST; ;  2/17/2025 11:20 pm      INDICATION:  Signs/Symptoms:Trauma.          COMPARISON:  None.      ACCESSION NUMBER(S):  [id number]; [id number]      ORDERING CLINICIAN:  ERIC HUERTA      TECHNIQUE:  Noncontrast CT exams of the head and cervical spine with multiplanar  reformations.      FINDINGS:  BRAIN PARENCHYMA: Moderate volume loss.  There is patchy white matter  hypoattenuation, nonspecific, but most often attributable to chronic  microvascular ischemic change. Gray-white matter interfaces are  preserved. No mass, mass effect or midline shift.      HEMORRHAGE: No acute intracranial hemorrhage.  VENTRICLES and EXTRA-AXIAL SPACES: Normal size.  EXTRACRANIAL SOFT TISSUES: Within normal limits.  PARANASAL SINUSES/MASTOIDS: The visualized paranasal sinuses and  mastoid air cells are aerated. CALVARIUM: No depressed skull  fracture. No destructive osseous lesion.      OTHER FINDINGS: None.      CERVICAL SPINE:      ALIGNMENT: Grade 1 degenerative anterolisthesis at C7-T1. Alignment  is otherwise maintained. VERTEBRAE: No acute fracture. Vertebral  stature is maintained. Endplate osteophytosis and irregularity  and  sclerosis associated with severe degenerative disc height loss at  C6-C7 greater than C5-C6. Moderate hypertrophic facet  osteoarthropathy. SPINAL CANAL: No critical spinal canal stenosis.  PREVERTEBRAL SOFT TISSUES: No prevertebral soft tissue swelling.  LUNG APICES: Imaged portion of the lung apices are within normal  limits.      OTHER FINDINGS: None.      Impression: No evidence of acute intracranial abnormality.      No acute cervical spine fracture or malalignment.          MACRO:  None      Signed by: Wayne Ceron 2/18/2025 12:02 AM  Dictation workstation:   [id number]      Assessment/Plan   Encounter Diagnoses:  No diagnosis found.    Right ankle fracture, lateral malleolus with displaced mortise    Assessment:    [unfilled]       Plan:  I discussed options with the patient.  Have discussed nonoperative treatment with immobilization in a period of nonweightbearing and follow-up serially.    The operative option was also discussed.  This would include Flyte plate fixation laterally.  This may or may not involve deltoid repair based on the intraoperative findings.  I have discussed that procedure with him in detail and at this point in time he like to proceed operatively versus nonoperatively.  Our options are to proceed with that on outpatient basis or more immediately before swelling takes effect.  He prefers the latter.  The patient is fortunately already been n.p.o. outside of ice chips and water and therefore we will try to plan with that immediately.  He is comfortable with the plan.            Electronically signed by Arsenio Lopes MD on 2/18/2025 at 2:42 PM

## 2025-02-18 NOTE — ED PROVIDER NOTES
"HPI   Chief Complaint   Patient presents with    Syncope    Ankle Pain       HPI  74-year-old male patient who presents after a syncopal episode.  Patient reports that at approximately 5:30 PM, he began to having significant recurrent runny stools.  Sick contact in family friend who had norovirus. Reporting some LLQ abdominal pain, a mild headache to the left forehead and some right ankle pain.  Patient reports that he had at least 3 episodes while he was downstairs in the toilet, was beginning to feel sweaty, worsening pain in his lower abdomen.  Went upstairs, use the toilet again with significant persistent liquid stool.  Went to lay down in his bed, again felt the urge to defecate, was walking down the [place], felt like he was going to pass out, believes he did indeed pass out, and \"must of hit my head\".  He feels like he came to immediately, immediately got up, and tried to walk the remainder of the way to the bathroom, he was able to get to the toilet, but the pain in his ankle was severe.  He denies anticoagulation, no recent aspirin.  He is very hopeful the ankle is not broken.    Patient had an echo in 3/24/2022, that is a tech check off sheet, I cannot see a formal Cardiology read or outpatient Cardiology note.  Patient does report a history of prostate cancer that he reports currently in remission. Per Rad/Onc note 12/6/24, patient got SBRT, completed 9/8/22.  Also history of hypertension, GERD.  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    Initially, patient was indicating that he would like to return home if possible.    His wife came to bedside after we had a known diagnosis of a likely bimalleolar ankle fracture, she indicated that there is no way for patient to get into the home with weather conditions as they are, and multiple steps into the home.  She indicates that she will be unable to get him into the house.    Patient History "   Past Medical History:   Diagnosis Date    Encounter for general adult medical examination without abnormal findings 03/01/2022    Medicare annual wellness visit, subsequent    Encounter for screening for malignant neoplasm of colon     Screen for colon cancer    Encounter for screening for malignant neoplasm of colon 03/18/2022    Colon cancer screening    Encounter for screening for malignant neoplasm of prostate 10/21/2015    Screening for prostate cancer    Other conditions influencing health status 02/17/2021    History of cough    Palpitations 08/18/2013    Palpitations    Personal history of diseases of the skin and subcutaneous tissue 01/15/2016    History of contact dermatitis    Personal history of other diseases of the respiratory system 02/19/2021    History of nasal discharge    Tinea cruris     Jock itch     Past Surgical History:   Procedure Laterality Date    OTHER SURGICAL HISTORY  04/15/2022    Foot surgery    OTHER SURGICAL HISTORY  04/15/2022    Vasectomy    OTHER SURGICAL HISTORY  04/15/2022    Excision of spermatocele    OTHER SURGICAL HISTORY  04/15/2022    Hemorrhoidectomy simple     Family History   Problem Relation Name Age of Onset    Migraines Mother      Other (Heart palpitations) Mother      Alzheimer's disease Father      Prostatitis Father      Parkinsonism Father       Social History     Tobacco Use    Smoking status: Never    Smokeless tobacco: Never   Substance Use Topics    Alcohol use: Not on file    Drug use: Not on file       Physical Exam   ED Triage Vitals [02/17/25 1957]   Temperature Heart Rate Respirations BP   36.2 °C (97.2 °F) 95 15 (!) 133/92      Pulse Ox Temp Source Heart Rate Source Patient Position   99 % Temporal Monitor Sitting      BP Location FiO2 (%)     Right arm --       Physical Exam  Vitals and nursing note reviewed.   Constitutional:       General: He is not in acute distress.     Appearance: He is well-developed.   HENT:      Head: Normocephalic and  atraumatic.   Eyes:      Conjunctiva/sclera: Conjunctivae normal.   Cardiovascular:      Rate and Rhythm: Normal rate and regular rhythm.      Pulses: Normal pulses.      Heart sounds: Normal heart sounds.   Pulmonary:      Effort: Pulmonary effort is normal. No respiratory distress.      Breath sounds: Normal breath sounds.   Abdominal:      Palpations: Abdomen is soft.      Tenderness: There is abdominal tenderness (Left lower quadrant, no guarding).   Musculoskeletal:         General: Swelling and tenderness (Tender distal to the medial malleolus, and over distal fibula, most likely represents severe sprain VS bimalleolar fracture) present.      Cervical back: Neck supple.   Skin:     General: Skin is warm and dry.      Capillary Refill: Capillary refill takes 2 to 3 seconds.   Neurological:      Mental Status: He is alert.   Psychiatric:         Mood and Affect: Mood normal.     Patient is neurovascularly intact, can wiggle his toes, has normal DP and PT pulses with good cap refill, normal dorsiflexion, plantarflexion, eversion/inversion that he can perform despite pain.    ED Course & MDM   ED Course as of 02/18/25 0101 Mon Feb 17, 2025   2100 ECG 12 lead  EKG shows normal sinus rhythm, rate 97, , QRS 74, QTc 47, normal axis deviation, T wave inversion in aVF, V4 through V6, troponins negative, but concerned that T wave inversions in lateral leads are new [JH]      ED Course User Index  [JH] Sonido Bedoya MD         Diagnoses as of 02/18/25 0101   Closed fracture of right ankle, initial encounter                 No data recorded     Cristian Coma Scale Score: 15 (02/17/25 1959 : Afshan Yost RN)                     Medical Decision Making  74-year-old male patient presents after a syncopal event after multiple episodes of high volume diarrhea.  Here he has a mild ASHANTI with creatinine 1.77 (Cr baseline 1.2), we are giving 1 L fluid, fentanyl 25 mcg as he does have a bimalleolar fracture on his x-ray.   Troponins negative, EKG nonischemic.  CT pursued as I am concerned the patient may have an occult trimalleolar fracture.  CT scan did show a likely bowel mildly or with a possible compacted distal tibia fracture component.  Patient remains neurovascular intact, I did splint with tech.  CT head and CT spine generally normal, CT abdomen pelvis did show likely colitis which is consistent with patient's likely norovirus.    Pain generally controlled with fentanyl once, followed by oxycodone-tylenol for splinting.    With this borderline ASHANTI, creatinine 1.77, new lateral T wave inversions on EKG (but with normal troponins), inability to get into home with crutches, will admit patient for high risk syncope. Placing a consult to orthopedics to be performed routine inpatient as this would typically be an appropriate outpatient elective discharge if not for the significant syncope in the setting of likely hypovolemia post diarrhea.    Dispo:  Observation    CI:  1. Closed fracture of right ankle, initial encounter  Referral to Orthopaedic Surgery      2. Syncope  3. ASHANTI  4. Contiguous T wave inversions    Procedure  Procedures         Sonido Bedoya MD  02/18/25 0106

## 2025-02-18 NOTE — H&P
History Of Present Illness  Julio Miller is a 74 y.o. male with past medical history significant for HTN, HLD, prostate cancer in remission, OAB, CKD3, GERD, depression presenting with syncope, diarrhea and ankle fracture.  Symptoms started this 2/17/2025 around 4:30 PM where began having sharp left-sided abdominal pain and diffuse watery diarrhea for multiple episodes.  Getting up Stairs lying down the bed then felt like he had to go once again got up started walking on the hallway and felt significantly diaphoretic and passed out.  He hit his head on the wall as well as fell onto his right ankle.  He was able to get up and limp into the bathroom where he had more episodes of watery diarrhea while off sweating.  After these episodes he did notice his severe ankle pain and subsequently called EMS and arrived in our ER.  Patient now no longer complaining of abdominal pain and has not had additional episodes of diarrhea here.  Denies any headache, chest pain, shortness of breath, abdominal pain, burning or discomfort with urination or any blood present in stool.  He has a known sick contact that he saw on Saturday who ended up cooking the food who had similar symptoms of diarrhea and diaphoresis.  He has had no travel, and has otherwise had a normal diet for him.    In the ER patient was worked up for syncopal event with CBC, CMP, serial troponins, BNP, COVID and flu.  Labs significant for showing mild ASHANTI with creatinine to 1.77 and BUN to 25, otherwise electrolytes within normal limits.  Troponins within normal limits.  Patient has mild leukocytosis to 11.6, COVID and flu negative.  Head imaging CT head and CT C-spine unremarkable.  Other imaging included chest x-ray, x-ray foot and ankle as well as CT abdomen pelvis and CT ankle.  Ankle imaging showing bimalleolar fracture.  CT abdomen pelvis showed likely colitis consistent with patient's diarrheal illness.  Chest x-ray unremarkable EKG showing T wave inversions  new from previous however most recent one was in 2010.  Patient received Tylenol and fentanyl in the ED for pain control.  Patient splinted in the ER and is neurovascularly intact following splinting.     Past Medical History  He has a past medical history of Encounter for general adult medical examination without abnormal findings (03/01/2022), Encounter for screening for malignant neoplasm of colon, Encounter for screening for malignant neoplasm of colon (03/18/2022), Encounter for screening for malignant neoplasm of prostate (10/21/2015), Other conditions influencing health status (02/17/2021), Palpitations (08/18/2013), Personal history of diseases of the skin and subcutaneous tissue (01/15/2016), Personal history of other diseases of the respiratory system (02/19/2021), and Tinea cruris.    Surgical History  He has a past surgical history that includes Other surgical history (04/15/2022); Other surgical history (04/15/2022); Other surgical history (04/15/2022); and Other surgical history (04/15/2022).     Social History  He reports that he has never smoked. He has never used smokeless tobacco. No history on file for alcohol use and drug use.    Family History  Family History   Problem Relation Name Age of Onset    Migraines Mother      Other (Heart palpitations) Mother      Alzheimer's disease Father      Prostatitis Father      Parkinsonism Father       Allergies  Cat dander, Cefuroxime axetil, House dust, Mold, and Testosterone    Review of Systems   Constitutional:  Positive for diaphoresis.   Respiratory:  Negative for shortness of breath and wheezing.    Cardiovascular:  Negative for chest pain, palpitations and leg swelling.   Gastrointestinal:  Positive for diarrhea. Negative for abdominal pain.   Neurological:  Positive for syncope. Negative for dizziness.     Physical Exam  Constitutional:       Appearance: Normal appearance.   HENT:      Head: Normocephalic and atraumatic.      Nose: Nose normal.       Mouth/Throat:      Mouth: Mucous membranes are moist.   Cardiovascular:      Rate and Rhythm: Normal rate and regular rhythm.      Pulses: Normal pulses.      Heart sounds: Normal heart sounds.   Pulmonary:      Effort: Pulmonary effort is normal.   Abdominal:      General: Abdomen is flat. Bowel sounds are normal.      Palpations: Abdomen is soft.      Tenderness: There is no abdominal tenderness. There is no right CVA tenderness, left CVA tenderness, guarding or rebound.   Musculoskeletal:      Cervical back: Normal range of motion.      Comments: Splint present on right lower extremity.  Patient able to wiggle toes, cap refill less than 2 seconds and patient's sensation intact.   Skin:     General: Skin is warm and dry.      Capillary Refill: Capillary refill takes less than 2 seconds.   Neurological:      General: No focal deficit present.      Mental Status: He is alert and oriented to person, place, and time.          Last Recorded Vitals  /84 (BP Location: Right arm, Patient Position: Sitting)   Pulse 98   Temp 36.2 °C (97.2 °F) (Temporal)   Resp 20   Wt 81.6 kg (180 lb)   SpO2 97%     Assessment/Plan   Assessment & Plan  Closed fracture of right ankle, initial encounter    Syncope    #Syncopal episode  #Viral gastroenteritis  #Nonoliguric, ASHANTI on CKD3- likely prerenal 2/2 above, baseline (1.2-1.3)  #R Ankle fracture  #Hx mild aortic stenosis  -Suspect single episode is likely due to dehydration with suspected viral GI illness, low concern for cardiogenic cause, given benign exam, EKG did show some inverted T waves in lateral leads unknown chronicity, denies any chest pain.  -Last echo 3/24/2022 showing EF 55 to 60%, some diastolic LV dysfunction, mild aortic stenosis, mild to moderate aortic valve regurg, 1+ TR/MR  -CT right ankle showing commuted oblique fracture of the lateral malleolus and above the level of the syndesmosis. There is lateral displacement of 1 cortical thickness and associated  widening of the medial ankle mortise.There are small ossific fragments posterior and medial to the medial aspect of the navicular bone  Plan:  -Continue mIVFs  -Check orthostatics  -Stool pathogen ordered  -Echocardiogram ordered  -Continue telemetry  -Orthopedics consulted for ankle fracture    Chronic conditions:  OAB  HTN  HLD  GERD  Depression  -Continue home meds as tolerated once med rec is complete    Diet: NPO  DVT ppx: Heparin  Fluids: LR  On Telemetry  Code Status: Full code  Dispo: Anticipate at least 1-2 midnight stays, orthopedic consultation, PT/OT evaluation.    To be staffed with attending physician,  Kirk Walter MD      Patient seen and examined independent of resident.  My input was implemented above and agree with documentation    Juan F Marcano, DO  [id number] IM

## 2025-02-19 ENCOUNTER — APPOINTMENT (OUTPATIENT)
Dept: RADIOLOGY | Facility: HOSPITAL | Age: 75
DRG: 493 | End: 2025-02-19
Payer: MEDICARE

## 2025-02-19 ENCOUNTER — ANESTHESIA (OUTPATIENT)
Dept: OPERATING ROOM | Facility: HOSPITAL | Age: 75
End: 2025-02-19
Payer: MEDICARE

## 2025-02-19 ENCOUNTER — ANESTHESIA EVENT (OUTPATIENT)
Dept: OPERATING ROOM | Facility: HOSPITAL | Age: 75
End: 2025-02-19
Payer: MEDICARE

## 2025-02-19 LAB
ALBUMIN SERPL BCP-MCNC: 3.6 G/DL (ref 3.4–5)
ANION GAP SERPL CALC-SCNC: 11 MMOL/L (ref 10–20)
BUN SERPL-MCNC: 11 MG/DL (ref 6–23)
C COLI+JEJ+UPSA DNA STL QL NAA+PROBE: NOT DETECTED
C DIF TOX TCDA+TCDB STL QL NAA+PROBE: NOT DETECTED
CALCIUM SERPL-MCNC: 8.5 MG/DL (ref 8.6–10.3)
CHLORIDE SERPL-SCNC: 105 MMOL/L (ref 98–107)
CO2 SERPL-SCNC: 27 MMOL/L (ref 21–32)
CREAT SERPL-MCNC: 1.33 MG/DL (ref 0.5–1.3)
EC STX1 GENE STL QL NAA+PROBE: NOT DETECTED
EC STX2 GENE STL QL NAA+PROBE: NOT DETECTED
EGFRCR SERPLBLD CKD-EPI 2021: 56 ML/MIN/1.73M*2
ERYTHROCYTE [DISTWIDTH] IN BLOOD BY AUTOMATED COUNT: 12.3 % (ref 11.5–14.5)
GLUCOSE BLD MANUAL STRIP-MCNC: 99 MG/DL (ref 74–99)
GLUCOSE SERPL-MCNC: 102 MG/DL (ref 74–99)
HCT VFR BLD AUTO: 38 % (ref 41–52)
HGB BLD-MCNC: 12.5 G/DL (ref 13.5–17.5)
MAGNESIUM SERPL-MCNC: 1.99 MG/DL (ref 1.6–2.4)
MCH RBC QN AUTO: 30.9 PG (ref 26–34)
MCHC RBC AUTO-ENTMCNC: 32.9 G/DL (ref 32–36)
MCV RBC AUTO: 94 FL (ref 80–100)
NOROVIRUS GI + GII RNA STL NAA+PROBE: NOT DETECTED
NRBC BLD-RTO: 0 /100 WBCS (ref 0–0)
PHOSPHATE SERPL-MCNC: 3.5 MG/DL (ref 2.5–4.9)
PLATELET # BLD AUTO: 135 X10*3/UL (ref 150–450)
POTASSIUM SERPL-SCNC: 4 MMOL/L (ref 3.5–5.3)
RBC # BLD AUTO: 4.04 X10*6/UL (ref 4.5–5.9)
RV RNA STL NAA+PROBE: NOT DETECTED
SALMONELLA DNA STL QL NAA+PROBE: NOT DETECTED
SHIGELLA DNA SPEC QL NAA+PROBE: NOT DETECTED
SODIUM SERPL-SCNC: 139 MMOL/L (ref 136–145)
V CHOLERAE DNA STL QL NAA+PROBE: NOT DETECTED
WBC # BLD AUTO: 7.9 X10*3/UL (ref 4.4–11.3)
Y ENTEROCOL DNA STL QL NAA+PROBE: NOT DETECTED

## 2025-02-19 PROCEDURE — 7100000001 HC RECOVERY ROOM TIME - INITIAL BASE CHARGE: Performed by: ORTHOPAEDIC SURGERY

## 2025-02-19 PROCEDURE — 85027 COMPLETE CBC AUTOMATED: CPT

## 2025-02-19 PROCEDURE — 2500000005 HC RX 250 GENERAL PHARMACY W/O HCPCS: Performed by: NURSE ANESTHETIST, CERTIFIED REGISTERED

## 2025-02-19 PROCEDURE — 2500000002 HC RX 250 W HCPCS SELF ADMINISTERED DRUGS (ALT 637 FOR MEDICARE OP, ALT 636 FOR OP/ED): Performed by: ORTHOPAEDIC SURGERY

## 2025-02-19 PROCEDURE — 2500000001 HC RX 250 WO HCPCS SELF ADMINISTERED DRUGS (ALT 637 FOR MEDICARE OP)

## 2025-02-19 PROCEDURE — 99233 SBSQ HOSP IP/OBS HIGH 50: CPT

## 2025-02-19 PROCEDURE — 2780000003 HC OR 278 NO HCPCS: Performed by: ORTHOPAEDIC SURGERY

## 2025-02-19 PROCEDURE — 1200000002 HC GENERAL ROOM WITH TELEMETRY DAILY

## 2025-02-19 PROCEDURE — 2500000004 HC RX 250 GENERAL PHARMACY W/ HCPCS (ALT 636 FOR OP/ED)

## 2025-02-19 PROCEDURE — 2500000001 HC RX 250 WO HCPCS SELF ADMINISTERED DRUGS (ALT 637 FOR MEDICARE OP): Performed by: STUDENT IN AN ORGANIZED HEALTH CARE EDUCATION/TRAINING PROGRAM

## 2025-02-19 PROCEDURE — 3600000004 HC OR TIME - INITIAL BASE CHARGE - PROCEDURE LEVEL FOUR: Performed by: ORTHOPAEDIC SURGERY

## 2025-02-19 PROCEDURE — C1713 ANCHOR/SCREW BN/BN,TIS/BN: HCPCS | Performed by: ORTHOPAEDIC SURGERY

## 2025-02-19 PROCEDURE — 83735 ASSAY OF MAGNESIUM: CPT

## 2025-02-19 PROCEDURE — 2720000007 HC OR 272 NO HCPCS: Performed by: ORTHOPAEDIC SURGERY

## 2025-02-19 PROCEDURE — 99222 1ST HOSP IP/OBS MODERATE 55: CPT

## 2025-02-19 PROCEDURE — 2500000004 HC RX 250 GENERAL PHARMACY W/ HCPCS (ALT 636 FOR OP/ED): Performed by: ORTHOPAEDIC SURGERY

## 2025-02-19 PROCEDURE — 3700000001 HC GENERAL ANESTHESIA TIME - INITIAL BASE CHARGE: Performed by: ORTHOPAEDIC SURGERY

## 2025-02-19 PROCEDURE — 3700000002 HC GENERAL ANESTHESIA TIME - EACH INCREMENTAL 1 MINUTE: Performed by: ORTHOPAEDIC SURGERY

## 2025-02-19 PROCEDURE — 0QSJ04Z REPOSITION RIGHT FIBULA WITH INTERNAL FIXATION DEVICE, OPEN APPROACH: ICD-10-PCS | Performed by: ANESTHESIOLOGY

## 2025-02-19 PROCEDURE — 2500000004 HC RX 250 GENERAL PHARMACY W/ HCPCS (ALT 636 FOR OP/ED): Performed by: NURSE ANESTHETIST, CERTIFIED REGISTERED

## 2025-02-19 PROCEDURE — 80069 RENAL FUNCTION PANEL: CPT

## 2025-02-19 PROCEDURE — 3600000009 HC OR TIME - EACH INCREMENTAL 1 MINUTE - PROCEDURE LEVEL FOUR: Performed by: ORTHOPAEDIC SURGERY

## 2025-02-19 PROCEDURE — 36415 COLL VENOUS BLD VENIPUNCTURE: CPT

## 2025-02-19 PROCEDURE — 7100000002 HC RECOVERY ROOM TIME - EACH INCREMENTAL 1 MINUTE: Performed by: ORTHOPAEDIC SURGERY

## 2025-02-19 PROCEDURE — 82947 ASSAY GLUCOSE BLOOD QUANT: CPT

## 2025-02-19 DEVICE — SCREW, CORTICAL, SELF-TAPPING, 3.5 X 14 MM, STAINLESS STEEL: Type: IMPLANTABLE DEVICE | Site: ANKLE | Status: FUNCTIONAL

## 2025-02-19 DEVICE — PLATE LCP TUBULAR 1/3 81MM 7H: Type: IMPLANTABLE DEVICE | Site: ANKLE | Status: FUNCTIONAL

## 2025-02-19 DEVICE — SCREW LOCKING 3.5 W/RECESS 14: Type: IMPLANTABLE DEVICE | Site: ANKLE | Status: FUNCTIONAL

## 2025-02-19 DEVICE — SCREW LOCKING 3.5 W/RECESS 16: Type: IMPLANTABLE DEVICE | Site: ANKLE | Status: FUNCTIONAL

## 2025-02-19 DEVICE — SCREW, CORTICAL, SELF-TAPPING, 3.5 X 16 MM, STAINLESS STEEL: Type: IMPLANTABLE DEVICE | Site: ANKLE | Status: FUNCTIONAL

## 2025-02-19 DEVICE — SCREW, CORTICAL, SELF-TAPPING, 3.5 X 18 MM, STAINLESS STEEL: Type: IMPLANTABLE DEVICE | Site: ANKLE | Status: FUNCTIONAL

## 2025-02-19 RX ORDER — MIDAZOLAM HYDROCHLORIDE 1 MG/ML
INJECTION, SOLUTION INTRAMUSCULAR; INTRAVENOUS CONTINUOUS PRN
Status: DISCONTINUED | OUTPATIENT
Start: 2025-02-19 | End: 2025-02-19

## 2025-02-19 RX ORDER — AMLODIPINE BESYLATE 2.5 MG/1
2.5 TABLET ORAL DAILY
Status: DISCONTINUED | OUTPATIENT
Start: 2025-02-19 | End: 2025-02-21 | Stop reason: HOSPADM

## 2025-02-19 RX ORDER — PROPOFOL 10 MG/ML
INJECTION, EMULSION INTRAVENOUS AS NEEDED
Status: DISCONTINUED | OUTPATIENT
Start: 2025-02-19 | End: 2025-02-19

## 2025-02-19 RX ORDER — MIDAZOLAM HYDROCHLORIDE 1 MG/ML
1 INJECTION, SOLUTION INTRAMUSCULAR; INTRAVENOUS ONCE AS NEEDED
Status: DISCONTINUED | OUTPATIENT
Start: 2025-02-19 | End: 2025-02-19 | Stop reason: HOSPADM

## 2025-02-19 RX ORDER — DIPHENHYDRAMINE HYDROCHLORIDE 50 MG/ML
12.5 INJECTION INTRAMUSCULAR; INTRAVENOUS ONCE AS NEEDED
Status: DISCONTINUED | OUTPATIENT
Start: 2025-02-19 | End: 2025-02-19 | Stop reason: HOSPADM

## 2025-02-19 RX ORDER — PHENYLEPHRINE HCL IN 0.9% NACL 1 MG/10 ML
SYRINGE (ML) INTRAVENOUS AS NEEDED
Status: DISCONTINUED | OUTPATIENT
Start: 2025-02-19 | End: 2025-02-19

## 2025-02-19 RX ORDER — ROSUVASTATIN CALCIUM 5 MG/1
5 TABLET, COATED ORAL EVERY OTHER DAY
Status: DISCONTINUED | OUTPATIENT
Start: 2025-02-19 | End: 2025-02-21 | Stop reason: HOSPADM

## 2025-02-19 RX ORDER — OXYCODONE HYDROCHLORIDE 5 MG/1
5 TABLET ORAL EVERY 4 HOURS PRN
Status: DISCONTINUED | OUTPATIENT
Start: 2025-02-19 | End: 2025-02-19 | Stop reason: HOSPADM

## 2025-02-19 RX ORDER — HYDROMORPHONE HYDROCHLORIDE 1 MG/ML
1 INJECTION, SOLUTION INTRAMUSCULAR; INTRAVENOUS; SUBCUTANEOUS EVERY 5 MIN PRN
Status: DISCONTINUED | OUTPATIENT
Start: 2025-02-19 | End: 2025-02-19 | Stop reason: HOSPADM

## 2025-02-19 RX ORDER — LIDOCAINE HYDROCHLORIDE 20 MG/ML
INJECTION, SOLUTION EPIDURAL; INFILTRATION; INTRACAUDAL; PERINEURAL AS NEEDED
Status: DISCONTINUED | OUTPATIENT
Start: 2025-02-19 | End: 2025-02-19

## 2025-02-19 RX ORDER — BISACODYL 5 MG
10 TABLET, DELAYED RELEASE (ENTERIC COATED) ORAL EVERY 8 HOURS
Status: DISCONTINUED | OUTPATIENT
Start: 2025-02-19 | End: 2025-02-20

## 2025-02-19 RX ORDER — SODIUM CHLORIDE, SODIUM GLUCONATE, SODIUM ACETATE, POTASSIUM CHLORIDE AND MAGNESIUM CHLORIDE 30; 37; 368; 526; 502 MG/100ML; MG/100ML; MG/100ML; MG/100ML; MG/100ML
INJECTION, SOLUTION INTRAVENOUS CONTINUOUS PRN
Status: DISCONTINUED | OUTPATIENT
Start: 2025-02-19 | End: 2025-02-19

## 2025-02-19 RX ORDER — ALBUTEROL SULFATE 0.83 MG/ML
2.5 SOLUTION RESPIRATORY (INHALATION) ONCE AS NEEDED
Status: DISCONTINUED | OUTPATIENT
Start: 2025-02-19 | End: 2025-02-19 | Stop reason: HOSPADM

## 2025-02-19 RX ORDER — METOPROLOL TARTRATE 1 MG/ML
INJECTION, SOLUTION INTRAVENOUS AS NEEDED
Status: DISCONTINUED | OUTPATIENT
Start: 2025-02-19 | End: 2025-02-19

## 2025-02-19 RX ORDER — HYDRALAZINE HYDROCHLORIDE 20 MG/ML
5 INJECTION INTRAMUSCULAR; INTRAVENOUS EVERY 30 MIN PRN
Status: DISCONTINUED | OUTPATIENT
Start: 2025-02-19 | End: 2025-02-19 | Stop reason: HOSPADM

## 2025-02-19 RX ORDER — POLYETHYLENE GLYCOL 3350 17 G/17G
238 POWDER, FOR SOLUTION ORAL ONCE
Status: DISCONTINUED | OUTPATIENT
Start: 2025-02-19 | End: 2025-02-19

## 2025-02-19 RX ORDER — CEFAZOLIN SODIUM 2 G/100ML
INJECTION, SOLUTION INTRAVENOUS AS NEEDED
Status: DISCONTINUED | OUTPATIENT
Start: 2025-02-19 | End: 2025-02-19

## 2025-02-19 RX ORDER — ONDANSETRON HYDROCHLORIDE 2 MG/ML
4 INJECTION, SOLUTION INTRAVENOUS ONCE AS NEEDED
Status: DISCONTINUED | OUTPATIENT
Start: 2025-02-19 | End: 2025-02-19 | Stop reason: HOSPADM

## 2025-02-19 RX ORDER — ONDANSETRON HYDROCHLORIDE 2 MG/ML
INJECTION, SOLUTION INTRAVENOUS AS NEEDED
Status: DISCONTINUED | OUTPATIENT
Start: 2025-02-19 | End: 2025-02-19

## 2025-02-19 RX ORDER — SODIUM CHLORIDE, SODIUM LACTATE, POTASSIUM CHLORIDE, CALCIUM CHLORIDE 600; 310; 30; 20 MG/100ML; MG/100ML; MG/100ML; MG/100ML
100 INJECTION, SOLUTION INTRAVENOUS CONTINUOUS
Status: DISCONTINUED | OUTPATIENT
Start: 2025-02-19 | End: 2025-02-19 | Stop reason: HOSPADM

## 2025-02-19 RX ORDER — FENTANYL CITRATE 50 UG/ML
12.5 INJECTION, SOLUTION INTRAMUSCULAR; INTRAVENOUS EVERY 5 MIN PRN
Status: DISCONTINUED | OUTPATIENT
Start: 2025-02-19 | End: 2025-02-19 | Stop reason: HOSPADM

## 2025-02-19 RX ORDER — NORETHINDRONE AND ETHINYL ESTRADIOL 0.5-0.035
KIT ORAL AS NEEDED
Status: DISCONTINUED | OUTPATIENT
Start: 2025-02-19 | End: 2025-02-19

## 2025-02-19 RX ORDER — ACETAMINOPHEN 325 MG/1
975 TABLET ORAL ONCE
Status: DISCONTINUED | OUTPATIENT
Start: 2025-02-19 | End: 2025-02-19 | Stop reason: HOSPADM

## 2025-02-19 RX ORDER — MEPERIDINE HYDROCHLORIDE 50 MG/ML
12.5 INJECTION INTRAMUSCULAR; INTRAVENOUS; SUBCUTANEOUS EVERY 10 MIN PRN
Status: DISCONTINUED | OUTPATIENT
Start: 2025-02-19 | End: 2025-02-19 | Stop reason: HOSPADM

## 2025-02-19 RX ORDER — FENTANYL CITRATE 50 UG/ML
INJECTION, SOLUTION INTRAMUSCULAR; INTRAVENOUS AS NEEDED
Status: DISCONTINUED | OUTPATIENT
Start: 2025-02-19 | End: 2025-02-19

## 2025-02-19 RX ORDER — LIDOCAINE HYDROCHLORIDE 10 MG/ML
0.1 INJECTION, SOLUTION INFILTRATION; PERINEURAL ONCE
Status: DISCONTINUED | OUTPATIENT
Start: 2025-02-19 | End: 2025-02-19 | Stop reason: HOSPADM

## 2025-02-19 RX ADMIN — PANTOPRAZOLE SODIUM 40 MG: 40 INJECTION, POWDER, FOR SOLUTION INTRAVENOUS at 09:42

## 2025-02-19 RX ADMIN — FENTANYL CITRATE 25 MCG: 50 INJECTION, SOLUTION INTRAMUSCULAR; INTRAVENOUS at 14:42

## 2025-02-19 RX ADMIN — PROPOFOL 150 MG: 10 INJECTION, EMULSION INTRAVENOUS at 14:47

## 2025-02-19 RX ADMIN — METOPROLOL TARTRATE 5 MG: 5 INJECTION INTRAVENOUS at 14:47

## 2025-02-19 RX ADMIN — AMLODIPINE BESYLATE 2.5 MG: 2.5 TABLET ORAL at 09:42

## 2025-02-19 RX ADMIN — LIDOCAINE HYDROCHLORIDE 80 MG: 20 INJECTION, SOLUTION EPIDURAL; INFILTRATION; INTRACAUDAL; PERINEURAL at 14:47

## 2025-02-19 RX ADMIN — Medication 100 MCG: at 14:58

## 2025-02-19 RX ADMIN — TAMSULOSIN HYDROCHLORIDE 0.4 MG: 0.4 CAPSULE ORAL at 21:05

## 2025-02-19 RX ADMIN — ONDANSETRON 4 MG: 2 INJECTION INTRAMUSCULAR; INTRAVENOUS at 14:56

## 2025-02-19 RX ADMIN — OXYCODONE HYDROCHLORIDE 5 MG: 5 TABLET ORAL at 12:15

## 2025-02-19 RX ADMIN — PANTOPRAZOLE SODIUM 40 MG: 40 INJECTION, POWDER, FOR SOLUTION INTRAVENOUS at 21:05

## 2025-02-19 RX ADMIN — HEPARIN SODIUM 5000 UNITS: 5000 INJECTION, SOLUTION INTRAVENOUS; SUBCUTANEOUS at 03:09

## 2025-02-19 RX ADMIN — SODIUM CHLORIDE, SODIUM GLUCONATE, SODIUM ACETATE, POTASSIUM CHLORIDE AND MAGNESIUM CHLORIDE: 526; 502; 368; 37; 30 INJECTION, SOLUTION INTRAVENOUS at 14:42

## 2025-02-19 RX ADMIN — Medication 100 MCG: at 15:38

## 2025-02-19 RX ADMIN — DEXAMETHASONE SODIUM PHOSPHATE 4 MG: 4 INJECTION, SOLUTION INTRAMUSCULAR; INTRAVENOUS at 14:56

## 2025-02-19 RX ADMIN — Medication 100 MCG: at 15:12

## 2025-02-19 RX ADMIN — CEFAZOLIN SODIUM 2 G: 2 INJECTION, SOLUTION INTRAVENOUS at 14:54

## 2025-02-19 RX ADMIN — Medication 100 MCG: at 15:03

## 2025-02-19 RX ADMIN — ROSUVASTATIN CALCIUM 5 MG: 5 TABLET, FILM COATED ORAL at 21:05

## 2025-02-19 RX ADMIN — OXYCODONE HYDROCHLORIDE 5 MG: 5 TABLET ORAL at 03:09

## 2025-02-19 RX ADMIN — EPHEDRINE SULFATE 10 MG: 50 INJECTION, SOLUTION INTRAVENOUS at 15:24

## 2025-02-19 ASSESSMENT — PAIN SCALES - GENERAL
PAINLEVEL_OUTOF10: 0 - NO PAIN
PAINLEVEL_OUTOF10: 5 - MODERATE PAIN
PAINLEVEL_OUTOF10: 2
PAINLEVEL_OUTOF10: 0 - NO PAIN
PAINLEVEL_OUTOF10: 2
PAINLEVEL_OUTOF10: 0 - NO PAIN
PAIN_LEVEL: 0
PAINLEVEL_OUTOF10: 0 - NO PAIN
PAINLEVEL_OUTOF10: 0 - NO PAIN

## 2025-02-19 ASSESSMENT — ACTIVITIES OF DAILY LIVING (ADL): LACK_OF_TRANSPORTATION: NO

## 2025-02-19 ASSESSMENT — COGNITIVE AND FUNCTIONAL STATUS - GENERAL
WALKING IN HOSPITAL ROOM: A LOT
CLIMB 3 TO 5 STEPS WITH RAILING: A LOT
MOBILITY SCORE: 14
MOVING TO AND FROM BED TO CHAIR: A LOT
TURNING FROM BACK TO SIDE WHILE IN FLAT BAD: A LOT
STANDING UP FROM CHAIR USING ARMS: A LOT

## 2025-02-19 ASSESSMENT — PAIN - FUNCTIONAL ASSESSMENT
PAIN_FUNCTIONAL_ASSESSMENT: 0-10

## 2025-02-19 ASSESSMENT — PAIN DESCRIPTION - LOCATION: LOCATION: FOOT

## 2025-02-19 ASSESSMENT — PAIN SCALES - WONG BAKER: WONGBAKER_NUMERICALRESPONSE: HURTS LITTLE BIT

## 2025-02-19 NOTE — PROGRESS NOTES
Julio Miller is a 74 y.o. male on day 1 of admission presenting with Closed fracture of right ankle, initial encounter.      Subjective   Patient was seen and examined today in the morning at bedside.  No acute events overnight.  He was seen in the morning endorsing his abdominal pain seems much improved with no further any bloody bowel movement.  His wife at bedside.  Discussion regarding his ankle procedure and how was the surgeon.    Objective     Last Recorded Vitals  /73 (BP Location: Right arm, Patient Position: Lying)   Pulse 81   Temp 36.2 °C (97.2 °F) (Temporal)   Resp 18   Wt 95 kg (209 lb 7 oz)   SpO2 97%   Intake/Output last 3 Shifts:    Intake/Output Summary (Last 24 hours) at 2/19/2025 1726  Last data filed at 2/19/2025 1601  Gross per 24 hour   Intake 1935 ml   Output 1200 ml   Net 735 ml       Admission Weight  Weight: 81.6 kg (180 lb) (02/17/25 1957)    Daily Weight  02/18/25 : 95 kg (209 lb 7 oz)      Physical Exam:  General: Not in acute distress, alert  HEENT: PERRLA, head intact and normocephalic  Neck: Normal to inspection  Lungs: Clear to auscultation, work of breathing within normal limit  Cardiac: Regular rate and rhythm  Abdomen: Soft mild tenderness in the left lower quadrant, positive bowel sounds  : Exam deferred  Skin: Intact  Hematology: No petechia or excessive ecchymosis  Musculoskeletal: Without significant trauma or lower limb edema.  Neurological: Alert awake oriented, no focal deficit, cranial nerves grossly intact  Psych: No suicidal ideation or homicidal ideation    Relevant Results  Scheduled medications  acetaminophen, 975 mg, oral, Once  amLODIPine, 2.5 mg, oral, Daily  [Transfer Hold] bisacodyl, 10 mg, oral, q8h  [Held by provider] heparin (porcine), 5,000 Units, subcutaneous, q8h JANAE  lidocaine, 0.1 mL, subcutaneous, Once  [Transfer Hold] pantoprazole, 40 mg, intravenous, BID  rosuvastatin, 5 mg, oral, Every other day  tamsulosin, 0.4 mg, oral,  Nightly      Continuous medications  lactated Ringer's, 100 mL/hr, Last Rate: 100 mL/hr (02/19/25 1634)      PRN medications  PRN medications: acetaminophen **OR** acetaminophen **OR** acetaminophen, albuterol, diphenhydrAMINE, fentaNYL PF, hydrALAZINE, HYDROmorphone, HYDROmorphone, meperidine, midazolam, ondansetron, oxyCODONE, oxyCODONE, oxygen     Results for orders placed or performed during the hospital encounter of 02/17/25 (from the past 24 hours)   Stool Pathogen Panel, PCR    Specimen: Stool   Result Value Ref Range    Campylobacter Group Not Detected Not Detected    Salmonella species Not Detected Not Detected    Shigella species Not Detected Not Detected    Vibrio Group Not Detected Not Detected    Yersinia Enterocolitica Not Detected Not Detected    Shiga Toxin 1 Not Detected Not Detected    Shiga Toxin 2 Not Detected Not Detected    Norovirus GI/GII Not Detected Not Detected    Rotavirus A Not Detected Not Detected   C. difficile, PCR    Specimen: Stool   Result Value Ref Range    C. difficile, PCR Not Detected Not Detected   Occult Blood, Stool    Specimen: Stool   Result Value Ref Range    Occult Blood, Stool X1 Negative Negative   CBC   Result Value Ref Range    WBC 7.9 4.4 - 11.3 x10*3/uL    nRBC 0.0 0.0 - 0.0 /100 WBCs    RBC 4.04 (L) 4.50 - 5.90 x10*6/uL    Hemoglobin 12.5 (L) 13.5 - 17.5 g/dL    Hematocrit 38.0 (L) 41.0 - 52.0 %    MCV 94 80 - 100 fL    MCH 30.9 26.0 - 34.0 pg    MCHC 32.9 32.0 - 36.0 g/dL    RDW 12.3 11.5 - 14.5 %    Platelets 135 (L) 150 - 450 x10*3/uL   Magnesium   Result Value Ref Range    Magnesium 1.99 1.60 - 2.40 mg/dL   Renal Function Panel   Result Value Ref Range    Glucose 102 (H) 74 - 99 mg/dL    Sodium 139 136 - 145 mmol/L    Potassium 4.0 3.5 - 5.3 mmol/L    Chloride 105 98 - 107 mmol/L    Bicarbonate 27 21 - 32 mmol/L    Anion Gap 11 10 - 20 mmol/L    Urea Nitrogen 11 6 - 23 mg/dL    Creatinine 1.33 (H) 0.50 - 1.30 mg/dL    eGFR 56 (L) >60 mL/min/1.73m*2    Calcium  8.5 (L) 8.6 - 10.3 mg/dL    Phosphorus 3.5 2.5 - 4.9 mg/dL    Albumin 3.6 3.4 - 5.0 g/dL   POCT GLUCOSE   Result Value Ref Range    POCT Glucose 99 74 - 99 mg/dL             FL fluoro images no charge    Result Date: 2/19/2025  These images are not reportable by radiology and will not be interpreted by  Radiologists.    CT angio chest for pulmonary embolism    Result Date: 2/18/2025  Interpreted By:  Jimenez Hernandez, STUDY: CT ANGIO CHEST FOR PULMONARY EMBOLISM;  2/18/2025 7:51 pm   INDICATION: Signs/Symptoms:Moderately enlarged right ventricle on echo there is suggestive for PE.     COMPARISON: None.   ACCESSION NUMBER(S): [id number]   ORDERING CLINICIAN: SHAYAN CARDENAS   TECHNIQUE: Contiguous axial images of the chest were obtained after the intravenous administration of iodinated contrast using angiographic PE protocol. Coronal and sagittal reformatted images were reconstructed from the axial data. MIP images were created on an independent workstation and reviewed.   FINDINGS:   MEDIASTINUM AND LYMPH NODES:  The esophageal wall appears within normal limits.  No enlarged intrathoracic or axillary lymph nodes by imaging criteria. No pneumomediastinum.   VESSELS:  Normal caliber thoracic aorta without dissection. Mild aortic atherosclerosis.  No acute pulmonary embolism.   HEART: The right ventricle is slightly dilated compared to the left ventricle with artery-LV ratio of 1.25. The bilateral atria appear slightly enlarged.  Severe coronary artery calcifications. No significant pericardial effusion.   LUNG, AIRWAYS, AND PLEURA: Emphysema. Mild bibasilar atelectasis. No consolidation, pulmonary edema, pleural effusion or pneumothorax.   OSSEOUS STRUCTURES: No acute osseous abnormality.   CHEST WALL SOFT TISSUES: No discernible abnormality.   UPPER ABDOMEN/OTHER: Cholelithiasis. No discernible gallbladder wall thickening, pericholecystic fluid, or stranding. There is inflammatory wall thickening and stranding  adjacent to the proximal descending colon, which was seen on 02/17/2025 CT abdomen/pelvis.       Slight enlargement of bilateral atria and mild right ventricular dilatation resulting in RV-LV ratio of 1.25. However, the main pulmonary artery is normal in diameter. This is not exclude pulmonary hypertension in the appropriate clinical context.   No acute pulmonary embolism.   Redemonstration of descending colitis.   Cholelithiasis.   Emphysema.   MACRO: None.   Signed by: Jimenez Hernandez 2/18/2025 9:30 PM Dictation workstation:   [id number]    Transthoracic Echo (TTE) Limited    Result Date: 2/18/2025            [facility] [address] [address], [place] [id number]    Tel [phone] Fax [phone] TRANSTHORACIC ECHOCARDIOGRAM REPORT Patient Name:       SILVIO Joyner Physician:    [id number] Alexandru Kaba MD Study Date:         2/18/2025            Ordering Provider:    [id number] ERIC HUERTA MRN/PID:            [id number]             Fellow: Accession#:         [id number]         Nurse: Date of Birth/Age:  1950 / 74 years Sonographer:          Annabel Sykes Gender Assigned at  M                    Additional Staff: Birth: Height:             170.18 cm            Admit Date: Weight:             81.65 kg             Admission Status:     Inpatient -                                                                Priority                                                                discharge BSA / BMI:          1.93 m2 / 28.19      Department Location:  [facility] Echo Lab                     kg/m2 Blood Pressure: 155 /78 mmHg Study Type:    TRANSTHORACIC ECHO (TTE) LIMITED Diagnosis/ICD: Syncope and collapse-R55 Indication:    syncopal episode CPT Codes:     Echo Limited-46841; Doppler Limited-20165; Color Doppler-29025 Patient History: Valve Disorders:   Aortic Stenosis. Pertinent History: CAD, HTN, Hyperlipidemia and Syncope. Study  Detail: The following Echo studies were performed: 2D, M-Mode, Doppler and               color flow. Technically challenging study due to patient lying in               supine position.  PHYSICIAN INTERPRETATION: Left Ventricle: The left ventricular systolic function is normal, with a visually estimated ejection fraction of 60-65%. There are no regional wall motion abnormalities. The left ventricular cavity size is normal. Spectral Doppler shows a Grade I (impaired relaxation pattern) of left ventricular diastolic filling with normal left atrial filling pressure. Left Atrium: The left atrial size is mildly dilated. Right Ventricle: The right ventricle is moderately enlarged. There is normal right ventricular global systolic function. Morales's sign is present, suggestive of pulmonary embolism. Right Atrium: The right atrial size is normal. Aortic Valve: The aortic valve is trileaflet. There is moderate aortic valve cusp calcification. There is evidence of mild aortic valve stenosis. The aortic valve dimensionless index is 0.33. There is trace aortic valve regurgitation. The peak instantaneous gradient of the aortic valve is 32 mmHg. The mean gradient of the aortic valve is 15 mmHg. Mitral Valve: The mitral valve is mildly thickened. There is mild mitral valve regurgitation. Tricuspid Valve: The tricuspid valve is structurally normal. There is trace tricuspid regurgitation. The Doppler estimated RVSP is within normal limits at 33.7 mmHg. Pulmonic Valve: The pulmonic valve is structurally normal. There is no indication of pulmonic valve regurgitation. Pericardium: No pericardial effusion noted. Aorta: The aortic root is normal. Systemic Veins: The inferior vena cava appears normal in size.  CONCLUSIONS:  1. The left ventricular systolic function is normal, with a visually estimated ejection fraction of 60-65%.  2. Spectral Doppler shows a Grade I (impaired relaxation pattern) of left ventricular diastolic filling  with normal left atrial filling pressure.  3. There is normal right ventricular global systolic function.  4. Morales's sign is present, suggestive of pulmonary embolism.  5. Moderately enlarged right ventricle.  6. Right ventricular systolic pressure is within normal limits.  7. Mild aortic valve stenosis.  8. There is moderate aortic valve cusp calcification. QUANTITATIVE DATA SUMMARY:  2D MEASUREMENTS:             Normal Ranges: LAs:             4.00 cm     (2.7-4.0cm) IVSd:            1.00 cm     (0.6-1.1cm) LVPWd:           1.70 cm     (0.6-1.1cm) LVIDd:           4.70 cm     (3.9-5.9cm) LVIDs:           3.20 cm LV Mass Index:   130.0 g/m2 LVEDV Index:     34.75 ml/m2 LV % FS          31.9 %  LEFT ATRIUM:                  Normal Ranges: LA Vol A4C:        37.4 ml    (22+/-6mL/m2) LA Vol A2C:        36.4 ml LA Vol BP:         39.8 ml LA Vol Index A4C:  19.3ml/m2 LA Vol Index A2C:  18.8 ml/m2 LA Vol Index BP:   20.6 ml/m2 LA Area A4C:       15.3 cm2 LA Area A2C:       14.0 cm2 LA Major Axis A4C: 5.3 cm LA Major Axis A2C: 4.6 cm LA Volume Index:   18.8 ml/m2 LA Vol A4C:        34.5 ml LA Vol A2C:        33.6 ml LA Vol Index BSA:  17.6 ml/m2  RIGHT ATRIUM:                 Normal Ranges: RA Vol A4C:        37.5 ml    (8.3-19.5ml) RA Vol Index A4C:  19.4 ml/m2 RA Area A4C:       14.1 cm2 RA Major Axis A4C: 4.5 cm  M-MODE MEASUREMENTS:         Normal Ranges: Ao Root:             3.30 cm (2.0-3.7cm) AoV Exc:             1.10 cm (1.5-2.5cm)  AORTA MEASUREMENTS:         Normal Ranges: AoV Exc:            1.10 cm (1.5-2.5cm)  LV SYSTOLIC FUNCTION:                      Normal Ranges: EF-A4C View:    61 % (>=55%) EF-A2C View:    61 % EF-Biplane:     60 % EF-Visual:      63 % LV EF Reported: 63 %  LV DIASTOLIC FUNCTION:            Normal Ranges: MV Peak E:             0.55 m/s   (0.7-1.2 m/s) MV Peak A:             0.80 m/s   (0.42-0.7 m/s) E/A Ratio:             0.69       (1.0-2.2) MV e'                  0.064 m/s   (>8.0) MV lateral e'          0.07 m/s MV medial e'           0.06 m/s E/e' Ratio:            8.69       (<8.0) PulmV Sys Raj:         38.60 cm/s PulmV Crane Raj:        41.40 cm/s PulmV S/D Raj:         0.90 PulmV A Revs Raj:      34.80 cm/s PulmV A Revs Dur:      92.00 msec  MITRAL VALVE:          Normal Ranges: MV DT:        255 msec (150-240msec)  MITRAL INSUFFICIENCY:             Normal Ranges: MR Vmax:              562.00 cm/s  AORTIC VALVE:                      Normal Ranges: AoV Vmax:                2.83 m/s  (<=1.7m/s) AoV Peak P.0 mmHg (<20mmHg) AoV Mean PG:             15.0 mmHg (1.7-11.5mmHg) LVOT Max Raj:            1.02 m/s  (<=1.1m/s) AoV VTI:                 49.40 cm  (18-25cm) LVOT VTI:                16.20 cm LVOT Diameter:           2.20 cm   (1.8-2.4cm) AoV Area, VTI:           1.25 cm2  (2.5-5.5cm2) AoV Area,Vmax:           1.37 cm2  (2.5-4.5cm2) AoV Dimensionless Index: 0.33  RIGHT VENTRICLE: RV Basal 3.50 cm RV Mid   3.60 cm RV Major 6.2 cm TAPSE:   24.9 mm RV s'    0.23 m/s  TRICUSPID VALVE/RVSP:          Normal Ranges: Peak TR Velocity:     2.77 m/s Est. RA Pressure:     3 mmHg RV Syst Pressure:     34 mmHg  (< 30mmHg) IVC Diam:             1.80 cm  PULMONARY VEINS: PulmV A Revs Dur: 92.00 msec PulmV A Revs Raj: 34.80 cm/s PulmV Crane Raj:   41.40 cm/s PulmV S/D Raj:    0.90 PulmV Sys Raj:    38.60 cm/s  [id number] Alexandru Kaba MD Electronically signed on 2025 at 4:13:10 PM  ** Final **     ECG 12 lead    Result Date: 2025  Normal sinus rhythm T wave abnormality, consider inferior ischemia Abnormal ECG When compared with ECG of 18-MAY-2010 14:35, Nonspecific T wave abnormality now evident in Anterolateral leads    CT ankle right wo IV contrast    Result Date: 2025  Interpreted By:  Wayne Ceron, STUDY: CT ANKLE RIGHT WO IV CONTRAST; ;  2025 11:20 pm   INDICATION: Signs/Symptoms:Trauma.     COMPARISON: None.   ACCESSION NUMBER(S): [id number]   ORDERING  CLINICIAN: ERIC HUERTA   TECHNIQUE: Noncontrast CT of the right ankle with multiplanar reformations.   FINDINGS: Acute mildly comminuted oblique fracture of the lateral malleolus at and above the level of the syndesmosis. There is lateral displacement of 1 cortical thickness and associated widening of the medial ankle mortise.   There are small ossific fragments posterior and medial to the medial aspect of the navicular bone (series 201, images 85-87), that may relate to accessory ossicles or displaced avulsion fracture fragments; please correlate clinically.   Small ossific fragment ventral to the distal tibial epiphysis, compatible with displaced avulsion fracture (series 204, images 51 and 52).   Tiny ossific fragment seen along the lateral process of the calcaneus, suspicious for small avulsion fracture (series 203, image 51).   Cortical indistinctness, lucency, depression and irregularity involving the medial and lateral anterior aspects of the talar dome (series 204, images 41-45; 53-56), and also involving the medial aspect of the distal tibial epiphysis, suspicious for acute impaction fracture.   ORIF changes to the distal 1st through 3rd metatarsals associated with healed and remodeled fractures, without definite evidence of hardware complication.   Soft tissue swelling and edema. No subcutaneous air or foreign body.       Please see above.     MACRO: None   Signed by: Wayne Ceron 2/18/2025 12:11 AM Dictation workstation:   [id number]    CT head wo IV contrast    Result Date: 2/18/2025  Interpreted By:  Wayne Ceron, STUDY: CT HEAD WO IV CONTRAST; CT CERVICAL SPINE WO IV CONTRAST; ; 2/17/2025 11:20 pm   INDICATION: Signs/Symptoms:Trauma.     COMPARISON: None.   ACCESSION NUMBER(S): [id number]; [id number]   ORDERING CLINICIAN: ERIC HUERTA   TECHNIQUE: Noncontrast CT exams of the head and cervical spine with multiplanar reformations.   FINDINGS: BRAIN PARENCHYMA: Moderate volume loss.  There is patchy  white matter hypoattenuation, nonspecific, but most often attributable to chronic microvascular ischemic change. Gray-white matter interfaces are preserved. No mass, mass effect or midline shift.   HEMORRHAGE: No acute intracranial hemorrhage. VENTRICLES and EXTRA-AXIAL SPACES: Normal size. EXTRACRANIAL SOFT TISSUES: Within normal limits. PARANASAL SINUSES/MASTOIDS: The visualized paranasal sinuses and mastoid air cells are aerated. CALVARIUM: No depressed skull fracture. No destructive osseous lesion.   OTHER FINDINGS: None.   CERVICAL SPINE:   ALIGNMENT: Grade 1 degenerative anterolisthesis at C7-T1. Alignment is otherwise maintained. VERTEBRAE: No acute fracture. Vertebral stature is maintained. Endplate osteophytosis and irregularity and sclerosis associated with severe degenerative disc height loss at C6-C7 greater than C5-C6. Moderate hypertrophic facet osteoarthropathy. SPINAL CANAL: No critical spinal canal stenosis. PREVERTEBRAL SOFT TISSUES: No prevertebral soft tissue swelling. LUNG APICES: Imaged portion of the lung apices are within normal limits.   OTHER FINDINGS: None.       No evidence of acute intracranial abnormality.   No acute cervical spine fracture or malalignment.     MACRO: None   Signed by: Wayne Ceron 2/18/2025 12:02 AM Dictation workstation:   [id number]    CT cervical spine wo IV contrast    Result Date: 2/18/2025  Interpreted By:  Wayne Ceron, STUDY: CT HEAD WO IV CONTRAST; CT CERVICAL SPINE WO IV CONTRAST; ; 2/17/2025 11:20 pm   INDICATION: Signs/Symptoms:Trauma.     COMPARISON: None.   ACCESSION NUMBER(S): [id number]; [id number]   ORDERING CLINICIAN: ERIC HUERTA   TECHNIQUE: Noncontrast CT exams of the head and cervical spine with multiplanar reformations.   FINDINGS: BRAIN PARENCHYMA: Moderate volume loss.  There is patchy white matter hypoattenuation, nonspecific, but most often attributable to chronic microvascular ischemic change. Gray-white matter interfaces are preserved.  No mass, mass effect or midline shift.   HEMORRHAGE: No acute intracranial hemorrhage. VENTRICLES and EXTRA-AXIAL SPACES: Normal size. EXTRACRANIAL SOFT TISSUES: Within normal limits. PARANASAL SINUSES/MASTOIDS: The visualized paranasal sinuses and mastoid air cells are aerated. CALVARIUM: No depressed skull fracture. No destructive osseous lesion.   OTHER FINDINGS: None.   CERVICAL SPINE:   ALIGNMENT: Grade 1 degenerative anterolisthesis at C7-T1. Alignment is otherwise maintained. VERTEBRAE: No acute fracture. Vertebral stature is maintained. Endplate osteophytosis and irregularity and sclerosis associated with severe degenerative disc height loss at C6-C7 greater than C5-C6. Moderate hypertrophic facet osteoarthropathy. SPINAL CANAL: No critical spinal canal stenosis. PREVERTEBRAL SOFT TISSUES: No prevertebral soft tissue swelling. LUNG APICES: Imaged portion of the lung apices are within normal limits.   OTHER FINDINGS: None.       No evidence of acute intracranial abnormality.   No acute cervical spine fracture or malalignment.     MACRO: None   Signed by: Wayne Ceron 2/18/2025 12:02 AM Dictation workstation:   [id number]    CT abdomen pelvis w IV contrast    Result Date: 2/17/2025  Interpreted By:  Wayne Ceron, STUDY: CT ABDOMEN PELVIS W IV CONTRAST; ;  2/17/2025 11:19 pm   INDICATION: Signs/Symptoms:LLQ tenderness, significant watery stools recurrent since 5:30 PM, assess for diverticulitis.     COMPARISON: None.   ACCESSION NUMBER(S): [id number]   ORDERING CLINICIAN: ERIC HUERTA   TECHNIQUE: Axial CT images of the abdomen and pelvis with coronal and sagittal reconstructed images performed after intravenous administration of 75 cc Omnipaque 350.   FINDINGS: LOWER CHEST: No acute abnormality of the lung bases. Mild platelike atelectasis or scarring in both lung bases. Normal heart size. Submucosal edema in the distal esophagus, compatible with esophagitis; correlate clinically with GERD. BONES: No acute  osseous abnormality. Grade 1 degenerative anterolisthesis at L1-L2 through L3-L4. Moderate degenerative changes of the lumbar spine. Mild-to-moderate bilateral hip osteoarthropathy. ABDOMINAL WALL: Small fat containing umbilical hernia.   ABDOMEN:   LIVER: Mildly enlarged. No focal lesion. BILE DUCTS: Normal caliber. GALLBLADDER: Cholelithiasis. No wall thickening or pericholecystic fluid or inflammation. PANCREAS: Within normal limits. SPLEEN: Within normal limits. ADRENALS: Within normal limits. KIDNEYS and URETERS: Within normal limits.     VESSELS: Mild atherosclerosis. No aneurysmal dilation. RETROPERITONEUM: No pathologically enlarged retroperitoneal lymph nodes.   PELVIS:   REPRODUCTIVE ORGANS: Prostate is not enlarged. Metallic densities are present in the prostate. BLADDER: Suboptimally evaluated due to incomplete distention, without definite abnormality.   BOWEL: Stomach appears within normal limits. No dilated or thickened small bowel. There is diffuse haustral thickening throughout the colon, submucosal edema, compatible with colitis. Colonic diverticulosis without convincing evidence of acute diverticulitis. Normal appendix. PERITONEUM: No ascites or free air, no fluid collection.       There is diffuse haustral thickening throughout the colon, submucosal edema, compatible with uncomplicated acute colitis. Colonic diverticulosis without convincing evidence of acute diverticulitis.   No other evidence of acute pathology.   Cholelithiasis without evidence of acute cholecystitis.   Mild atherosclerosis.   Additional findings as discussed above.     MACRO: None   Signed by: Wayne Ceron 2/17/2025 11:55 PM Dictation workstation:   [id number]    XR chest 1 view    Result Date: 2/17/2025  Interpreted By:  Elmo Youssef, STUDY: XR CHEST 1 VIEW;  2/17/2025 9:39 pm   INDICATION: Signs/Symptoms:Syncope.     COMPARISON: None.   ACCESSION NUMBER(S): [id number]   ORDERING CLINICIAN: ERIC HUERTA   FINDINGS:          CARDIOMEDIASTINAL SILHOUETTE: Cardiomediastinal silhouette is normal in size and configuration.   LUNGS: Lungs are clear.   ABDOMEN: No remarkable upper abdominal findings.   BONES: No acute osseous changes.       1.  No evidence of acute cardiopulmonary process.       MACRO: None   Signed by: Elmo Youssef 2/17/2025 9:41 PM Dictation workstation:   [id number]    XR foot right 3+ views    Result Date: 2/17/2025  Interpreted By:  Elmo Youssef, STUDY: XR FOOT RIGHT 3+ VIEWS; ;  2/17/2025 9:21 pm   INDICATION: Signs/Symptoms:Trauma.     COMPARISON: None.   ACCESSION NUMBER(S): [id number]   ORDERING CLINICIAN: ERIC BRADLEY   FINDINGS:   Right ankle and right foot, three views of each   There is a mildly displaced oblique fracture through the distal fibula. There is widening of the medial clear space. There is severe bimalleolar soft tissue edema. No definite fracture seen in the tibia   Postsurgical changes in the foot to include osteotomy with fixation in the 1st, 2nd and 3rd metatarsal heads and the base of the 1st proximal phalanx. Moderate degenerative changes in the 1st, 2nd 3rd MTP joints.       Mildly displaced fracture through the distal fibula. Widening of the medial clear space compatible with underlying ligamentous injury. Bimalleolar soft tissue edema. Postsurgical changes of the forefoot without acute abnormality   MACRO: None   Signed by: Elmo Youssef 2/17/2025 9:26 PM Dictation workstation:   [id number]    XR ankle right 3+ views    Result Date: 2/17/2025  Interpreted By:  Elmo Youssef, STUDY: XR ANKLE RIGHT 3+ VIEWS; ;  2/17/2025 9:21 pm   INDICATION: Signs/Symptoms:Trauma.     COMPARISON: None.   ACCESSION NUMBER(S): [id number]   ORDERING CLINICIAN: ERIC BRADLEY   FINDINGS: Right ankle and right foot, three views of each   There is a mildly displaced oblique fracture through the distal fibula. There is widening of the medial clear space. There is severe bimalleolar soft tissue edema.  No definite fracture seen in the tibia   Postsurgical changes in the foot to include osteotomy with fixation in the 1st, 2nd and 3rd metatarsal heads and the base of the 1st proximal phalanx. Moderate degenerative changes in the 1st, 2nd 3rd MTP joints.         Mildly displaced fracture through the distal fibula. Widening of the medial clear space compatible with underlying ligamentous injury. Bimalleolar soft tissue edema. Postsurgical changes of the forefoot without acute abnormality     MACRO: None   Signed by: Elmo Youssef 2/17/2025 9:26 PM Dictation workstation:   [id number]       Julio Miller is a 74 y.o. male on day 1 of admission presenting with Closed fracture of right ankle, initial encounter.  Assessment/Plan                Assessment & Plan  Closed fracture of right ankle, initial encounter    Syncope      74-year-old male with a history of HTN, HLD, prostate cancer in remission, OAB, CKD3, GERD, and depression presenting with syncope, diarrhea, and a right ankle fracture. Symptoms likely secondary to acute diarrheal illness, possibly infectious given recent sick contact, leading to volume depletion and a syncopal episode. Workup revealed mild ASHANTI (Cr 1.77, BUN 25), mild leukocytosis (WBC 11.6), and imaging findings of colitis. No ongoing diarrhea or abdominal pain in the hospital. CT head and C-spine were unremarkable. EKG showed new T wave inversions compared to 2010; serial troponins were negative. Ankle imaging confirmed a bimalleolar fracture, currently splinted with intact neurovascular status. Plan includes IV fluids for ASHANTI, stool studies if diarrhea recurs, pain control with Tylenol and opioids as needed, orthopedic consultation for fracture management, telemetry monitoring for syncope evaluation, and cardiology input given EKG changes.    #Syncopal episode  #Viral gastroenteritis  #Pulmonary hypertension  #Hx mild aortic stenosis  -Suspect single episode is likely due to dehydration with  suspected viral GI illness, low concern for cardiogenic cause, given benign exam, EKG did show some inverted T waves in lateral leads unknown chronicity, denies any chest pain. Last echo 3/24/2022 showing EF 55 to 60%, some diastolic LV dysfunction, mild aortic stenosis, mild to moderate aortic valve regurg, 1+ TR/MR. Repeated echo showed EF of 60% with right ventricular dilatation which was concerning for PE.  Subsequently patient underwent CT scan that showed no PE but showed significant enlargement of bilateral atria with mild right ventricular dilatation with normal main pulmonary artery.    Plan:  -Due to ankle fracture unable to obtain full orthostatic vitals.  -Stool pathogen negative, C. difficile negative  -Continue telemetry  -Monitor electrolyte and replace as appropriate.  Keep K above 4, mag above 2.  -Patient will need outpatient follow-up with cardiology to monitor for pulmonary pretension    #GI bleed likely upper versus lower  #History of lower GI bleed in the setting of hemorrhoids s/p resection  #Viral gastroenteritis  -Patient had 1 bowel movement on 2/18 that was dark blood picture attached. Had colonoscopy on 2022 that showed hemorrhoids on perianal exam with 2 polyps in the ascending colon and mild diverticulosis in the sigmoid colon, 2 polyps in the rectum which was removed    Plan   -N.p.o. after midnight  -Pantoprazole 40 mg IV twice daily  -Supportive measure  -Will monitor CBC on transfuse if hemoglobin less than 7  -GI consulted, appreciate recommendation.    #R Ankle fracture  -Initially in the ED x-ray showed confirmed a bimalleolar fracture  -CT right ankle showing commuted oblique fracture of the lateral malleolus and above the level of the syndesmosis. There is lateral displacement of 1 cortical thickness and associated widening of the medial ankle mortise.There are small ossific fragments posterior and medial to the medial aspect of the navicular bone  -Patient underwent ORIF by  Dr. Lopes on 2/19   -Will reevaluate the patient after he gets out of the OR   -Ortho consulted, appreciate recommendation  -PT OT    #Nonoliguric, ASHANTI on CKD3-   -Aetiology is most likely prerenal 2/2 likely gastroenteritis, baseline (1.2-1.3)  PLAN:  -Status post IV resuscitation in the ED.  -Placed on IV maintenance fluids with  L/h   -Repeated RFP showed improvement in creatinine function to 1.3  -Daily RFP's  -Avoid nephrotoxic agents  -Renal adjustment of medications.     Chronic conditions:  OAB  HTN  HLD  GERD  Depression  -Continue home meds as tolerated once med rec is complete     Diet: NPO  DVT ppx: Heparin  Fluids: LR  On Telemetry  Code Status: Full code  Dispo: Anticipate at least 1-2 midnight stays, orthopedic consultation, PT/OT evaluation.      Assessment and plan discussed with my attending.   Magdi Hamilton MD   Internal Medicine, PGY-2 .

## 2025-02-19 NOTE — CARE PLAN
The patient's goals for the shift include To not fall all shift    The clinical goals for the shift include Pt to remain hemodynamically stable all shift

## 2025-02-19 NOTE — CONSULTS
Gastroenterology   Consult     Patient Julio Miller PCP Rufus Hudson MD    MRN [id number]  Admission Date 2/17/2025      Chief Complaint/Reason for Admission:  Julio is a 74 y.o. male who presented today with syncope, diarrhea    Inpatient consult to Gastroenterology  Consult performed by: Kaylyn Vargas MD  Consult ordered by: Shelly Boothe MD           Subjective    Subjective   History of Presenting Illness  Mr. Julio Miller is a 74 y.o. male with a past medical history of GERD, HTN, dyslipidemia who presented with syncope diarrhea and ankle fracture.  He has been having sharp left-sided abdominal pain with diffuse watery diarrhea.  He did endorses sick contacts will also had similar episodes of diarrhea and diaphoresis, but denied any travel.  He denied any nausea or vomiting.    Most recent colonoscopy was done in 06/2022 which showed internal hemorrhoids and one 2 mm polyp in the ascending colon which was biopsied, as well as mild diverticulosis in sigmoid colon but otherwise unremarkable     In the ED, he was hemodynamically stable.  Initial labs showed mild leukocytosis, no anemia, elevated CR 1.77.  CT A/P showed diffuse haustral thickening throughout the colon, submucosal edema compatible with uncomplicated acute colitis as well as colonic diverticulosis without evidence of diverticulitis.  C. difficile PCR negative, pathogen PCR negative, stool occult negative.     Consult to GI placed for bloody bowel movement on admission (see picture below)      Home Medication:  Prior to Admission medications    Medication Sig Start Date End Date Taking? Authorizing Provider   rosuvastatin (Crestor) 5 mg tablet Take 1 tablet (5 mg) by mouth every other day.  Patient taking differently: Take 1 tablet (5 mg) by mouth every other day. Alternate with Tamsulosin at bedtime 11/4/24  Yes Rufus Hudson MD   amLODIPine (Norvasc) 2.5 mg tablet Take 1 tablet (2.5 mg) by mouth once daily. 8/2/24   Kenroy Marc MD  "  amLODIPine (Norvasc) 2.5 mg tablet Take 1 tablet (2.5 mg) by mouth once daily. 8/25/24   Rufus Hudson MD   aspirin 81 mg EC tablet Take 1 tablet (81 mg) by mouth 2 times a week.    Historical Provider, MD   CALCIUM-MAGNESIUM-ZINC ORAL Take 1 tablet by mouth once daily.    Historical Provider, MD   cyclobenzaprine (Flexeril) 10 mg tablet Take 1 tablet (10 mg) by mouth 3 times a day as needed for muscle spasms.  Patient not taking: Reported on 2/18/2025 9/3/24 11/2/24  Rufus Hudson MD   potassium citrate 99 mg capsule Take 1 tablet by mouth once daily.    Historical Provider, MD   tamsulosin (Flomax) 0.4 mg 24 hr capsule Take 1 capsule (0.4 mg) by mouth once daily at bedtime.  Patient taking differently: Take 1 capsule (0.4 mg) by mouth every other day. Alternate with Rosuvastatin 8/13/24   Fuentes Caba MD          Review of System:  Review of Systems  See HPI    Objective    Objective   Vital Signs:  Visit Vitals  /69 (BP Location: Right arm, Patient Position: Lying)   Pulse 77   Temp 36.5 °C (97.7 °F) (Temporal)   Resp 18   Ht 1.702 m (5' 7.01\")   Wt 95 kg (209 lb 7 oz)   SpO2 97%   BMI 32.79 kg/m²   Smoking Status Never   BSA 2.12 m²        Physical Examination:  General: Patient does not appear to be in any acute distress, alert, awake  Head: Normocephalic, Atraumatic   Cardiovascular: RRR, S1/S2, no murmurs, rubs, or gallops, radial pulses +2  Pulmonary: CTAB, no respiratory distress.  Abdomen: +BS, soft, non-tender, nondistended, no guarding or rebound, no masses noted  Neuro: A&O x3, CN intact, no focal deficits, strength and sensation intact bilaterally  MSK: No joint swelling, normal movements of all extremities. Passive ROM intact  Extremities: No edema appreciated in lower extremities bilaterally, no cyanosis  Skin- Warm. Dry. No lesions, contusions, or erythema.  Psychiatric: Judgment intact. Appropriate mood and behavior      Laboratory Data:  Results for orders placed or performed during the " hospital encounter of 02/17/25 (from the past 24 hours)   Transthoracic Echo (TTE) Limited   Result Value Ref Range    AV pk konrad 2.83 m/s    LV Biplane EF 60 %    LVOT diam 2.20 cm    MV E/A ratio 0.69     Tricuspid annular plane systolic excursion 2.5 cm    LA vol index A/L 20.6 ml/m2    AV mn grad 15 mmHg    LV EF 63 %    RV free wall pk S' 22.80 cm/s    RVSP 33.7 mmHg    LVIDd 4.70 cm    Aortic Valve Area by Continuity of Peak Velocity 1.37 cm2    AV pk grad 32 mmHg    Aortic Valve Area by Continuity of VTI 1.25 cm2    LV A4C EF 60.5    Troponin I, High Sensitivity   Result Value Ref Range    Troponin I, High Sensitivity 4 0 - 20 ng/L   Stool Pathogen Panel, PCR    Specimen: Stool   Result Value Ref Range    Campylobacter Group Not Detected Not Detected    Salmonella species Not Detected Not Detected    Shigella species Not Detected Not Detected    Vibrio Group Not Detected Not Detected    Yersinia Enterocolitica Not Detected Not Detected    Shiga Toxin 1 Not Detected Not Detected    Shiga Toxin 2 Not Detected Not Detected    Norovirus GI/GII Not Detected Not Detected    Rotavirus A Not Detected Not Detected   C. difficile, PCR    Specimen: Stool   Result Value Ref Range    C. difficile, PCR Not Detected Not Detected   Occult Blood, Stool    Specimen: Stool   Result Value Ref Range    Occult Blood, Stool X1 Negative Negative   CBC   Result Value Ref Range    WBC 7.9 4.4 - 11.3 x10*3/uL    nRBC 0.0 0.0 - 0.0 /100 WBCs    RBC 4.04 (L) 4.50 - 5.90 x10*6/uL    Hemoglobin 12.5 (L) 13.5 - 17.5 g/dL    Hematocrit 38.0 (L) 41.0 - 52.0 %    MCV 94 80 - 100 fL    MCH 30.9 26.0 - 34.0 pg    MCHC 32.9 32.0 - 36.0 g/dL    RDW 12.3 11.5 - 14.5 %    Platelets 135 (L) 150 - 450 x10*3/uL   Magnesium   Result Value Ref Range    Magnesium 1.99 1.60 - 2.40 mg/dL   Renal Function Panel   Result Value Ref Range    Glucose 102 (H) 74 - 99 mg/dL    Sodium 139 136 - 145 mmol/L    Potassium 4.0 3.5 - 5.3 mmol/L    Chloride 105 98 - 107  mmol/L    Bicarbonate 27 21 - 32 mmol/L    Anion Gap 11 10 - 20 mmol/L    Urea Nitrogen 11 6 - 23 mg/dL    Creatinine 1.33 (H) 0.50 - 1.30 mg/dL    eGFR 56 (L) >60 mL/min/1.73m*2    Calcium 8.5 (L) 8.6 - 10.3 mg/dL    Phosphorus 3.5 2.5 - 4.9 mg/dL    Albumin 3.6 3.4 - 5.0 g/dL       Imaging:  CT angio chest for pulmonary embolism    Result Date: 2/18/2025  Interpreted By:  Jimenez Hernandez, STUDY: CT ANGIO CHEST FOR PULMONARY EMBOLISM;  2/18/2025 7:51 pm   INDICATION: Signs/Symptoms:Moderately enlarged right ventricle on echo there is suggestive for PE.     COMPARISON: None.   ACCESSION NUMBER(S): [id number]   ORDERING CLINICIAN: SHAYAN CARDENAS   TECHNIQUE: Contiguous axial images of the chest were obtained after the intravenous administration of iodinated contrast using angiographic PE protocol. Coronal and sagittal reformatted images were reconstructed from the axial data. MIP images were created on an independent workstation and reviewed.   FINDINGS:   MEDIASTINUM AND LYMPH NODES:  The esophageal wall appears within normal limits.  No enlarged intrathoracic or axillary lymph nodes by imaging criteria. No pneumomediastinum.   VESSELS:  Normal caliber thoracic aorta without dissection. Mild aortic atherosclerosis.  No acute pulmonary embolism.   HEART: The right ventricle is slightly dilated compared to the left ventricle with artery-LV ratio of 1.25. The bilateral atria appear slightly enlarged.  Severe coronary artery calcifications. No significant pericardial effusion.   LUNG, AIRWAYS, AND PLEURA: Emphysema. Mild bibasilar atelectasis. No consolidation, pulmonary edema, pleural effusion or pneumothorax.   OSSEOUS STRUCTURES: No acute osseous abnormality.   CHEST WALL SOFT TISSUES: No discernible abnormality.   UPPER ABDOMEN/OTHER: Cholelithiasis. No discernible gallbladder wall thickening, pericholecystic fluid, or stranding. There is inflammatory wall thickening and stranding adjacent to the proximal  descending colon, which was seen on 02/17/2025 CT abdomen/pelvis.       Slight enlargement of bilateral atria and mild right ventricular dilatation resulting in RV-LV ratio of 1.25. However, the main pulmonary artery is normal in diameter. This is not exclude pulmonary hypertension in the appropriate clinical context.   No acute pulmonary embolism.   Redemonstration of descending colitis.   Cholelithiasis.   Emphysema.   MACRO: None.   Signed by: Jimenez Hernandez 2/18/2025 9:30 PM Dictation workstation:   [id number]    Transthoracic Echo (TTE) Limited    Result Date: 2/18/2025            [facility] [address], [facility] [address]    Tel [phone] Fax [phone] TRANSTHORACIC ECHOCARDIOGRAM REPORT Patient Name:       SILVIO BARRAZA        Reading Physician:    [id number] Alexandru Kaba MD Study Date:         2/18/2025            Ordering Provider:    [id number] ERIC HUERTA MRN/PID:            [id number]             Fellow: Accession#:         [id number]         Nurse: Date of Birth/Age:  1950 / 74 years Sonographer:          Annabel Sykes Gender Assigned at  M                    Additional Staff: Birth: Height:             170.18 cm            Admit Date: Weight:             81.65 kg             Admission Status:     Inpatient -                                                                Priority                                                                discharge BSA / BMI:          1.93 m2 / 28.19      Department Location:  [facility]                     kg/m2 Blood Pressure: 155 /78 mmHg Study Type:    TRANSTHORACIC ECHO (TTE) LIMITED Diagnosis/ICD: Syncope and collapse-R55 Indication:    syncopal episode CPT Codes:     Echo Limited-66306; Doppler Limited-46991; Color Doppler-27796 Patient History: Valve Disorders:   Aortic Stenosis. Pertinent History: CAD, HTN, Hyperlipidemia and Syncope. Study Detail: The following Echo  studies were performed: 2D, M-Mode, Doppler and               color flow. Technically challenging study due to patient lying in               supine position.  PHYSICIAN INTERPRETATION: Left Ventricle: The left ventricular systolic function is normal, with a visually estimated ejection fraction of 60-65%. There are no regional wall motion abnormalities. The left ventricular cavity size is normal. Spectral Doppler shows a Grade I (impaired relaxation pattern) of left ventricular diastolic filling with normal left atrial filling pressure. Left Atrium: The left atrial size is mildly dilated. Right Ventricle: The right ventricle is moderately enlarged. There is normal right ventricular global systolic function. Morales's sign is present, suggestive of pulmonary embolism. Right Atrium: The right atrial size is normal. Aortic Valve: The aortic valve is trileaflet. There is moderate aortic valve cusp calcification. There is evidence of mild aortic valve stenosis. The aortic valve dimensionless index is 0.33. There is trace aortic valve regurgitation. The peak instantaneous gradient of the aortic valve is 32 mmHg. The mean gradient of the aortic valve is 15 mmHg. Mitral Valve: The mitral valve is mildly thickened. There is mild mitral valve regurgitation. Tricuspid Valve: The tricuspid valve is structurally normal. There is trace tricuspid regurgitation. The Doppler estimated RVSP is within normal limits at 33.7 mmHg. Pulmonic Valve: The pulmonic valve is structurally normal. There is no indication of pulmonic valve regurgitation. Pericardium: No pericardial effusion noted. Aorta: The aortic root is normal. Systemic Veins: The inferior vena cava appears normal in size.  CONCLUSIONS:  1. The left ventricular systolic function is normal, with a visually estimated ejection fraction of 60-65%.  2. Spectral Doppler shows a Grade I (impaired relaxation pattern) of left ventricular diastolic filling with normal left atrial  filling pressure.  3. There is normal right ventricular global systolic function.  4. Morales's sign is present, suggestive of pulmonary embolism.  5. Moderately enlarged right ventricle.  6. Right ventricular systolic pressure is within normal limits.  7. Mild aortic valve stenosis.  8. There is moderate aortic valve cusp calcification. QUANTITATIVE DATA SUMMARY:  2D MEASUREMENTS:             Normal Ranges: LAs:             4.00 cm     (2.7-4.0cm) IVSd:            1.00 cm     (0.6-1.1cm) LVPWd:           1.70 cm     (0.6-1.1cm) LVIDd:           4.70 cm     (3.9-5.9cm) LVIDs:           3.20 cm LV Mass Index:   130.0 g/m2 LVEDV Index:     34.75 ml/m2 LV % FS          31.9 %  LEFT ATRIUM:                  Normal Ranges: LA Vol A4C:        37.4 ml    (22+/-6mL/m2) LA Vol A2C:        36.4 ml LA Vol BP:         39.8 ml LA Vol Index A4C:  19.3ml/m2 LA Vol Index A2C:  18.8 ml/m2 LA Vol Index BP:   20.6 ml/m2 LA Area A4C:       15.3 cm2 LA Area A2C:       14.0 cm2 LA Major Axis A4C: 5.3 cm LA Major Axis A2C: 4.6 cm LA Volume Index:   18.8 ml/m2 LA Vol A4C:        34.5 ml LA Vol A2C:        33.6 ml LA Vol Index BSA:  17.6 ml/m2  RIGHT ATRIUM:                 Normal Ranges: RA Vol A4C:        37.5 ml    (8.3-19.5ml) RA Vol Index A4C:  19.4 ml/m2 RA Area A4C:       14.1 cm2 RA Major Axis A4C: 4.5 cm  M-MODE MEASUREMENTS:         Normal Ranges: Ao Root:             3.30 cm (2.0-3.7cm) AoV Exc:             1.10 cm (1.5-2.5cm)  AORTA MEASUREMENTS:         Normal Ranges: AoV Exc:            1.10 cm (1.5-2.5cm)  LV SYSTOLIC FUNCTION:                      Normal Ranges: EF-A4C View:    61 % (>=55%) EF-A2C View:    61 % EF-Biplane:     60 % EF-Visual:      63 % LV EF Reported: 63 %  LV DIASTOLIC FUNCTION:            Normal Ranges: MV Peak E:             0.55 m/s   (0.7-1.2 m/s) MV Peak A:             0.80 m/s   (0.42-0.7 m/s) E/A Ratio:             0.69       (1.0-2.2) MV e'                  0.064 m/s  (>8.0) MV lateral e'           0.07 m/s MV medial e'           0.06 m/s E/e' Ratio:            8.69       (<8.0) PulmV Sys Raj:         38.60 cm/s PulmV Crane Raj:        41.40 cm/s PulmV S/D Raj:         0.90 PulmV A Revs Raj:      34.80 cm/s PulmV A Revs Dur:      92.00 msec  MITRAL VALVE:          Normal Ranges: MV DT:        255 msec (150-240msec)  MITRAL INSUFFICIENCY:             Normal Ranges: MR Vmax:              562.00 cm/s  AORTIC VALVE:                      Normal Ranges: AoV Vmax:                2.83 m/s  (<=1.7m/s) AoV Peak P.0 mmHg (<20mmHg) AoV Mean PG:             15.0 mmHg (1.7-11.5mmHg) LVOT Max Raj:            1.02 m/s  (<=1.1m/s) AoV VTI:                 49.40 cm  (18-25cm) LVOT VTI:                16.20 cm LVOT Diameter:           2.20 cm   (1.8-2.4cm) AoV Area, VTI:           1.25 cm2  (2.5-5.5cm2) AoV Area,Vmax:           1.37 cm2  (2.5-4.5cm2) AoV Dimensionless Index: 0.33  RIGHT VENTRICLE: RV Basal 3.50 cm RV Mid   3.60 cm RV Major 6.2 cm TAPSE:   24.9 mm RV s'    0.23 m/s  TRICUSPID VALVE/RVSP:          Normal Ranges: Peak TR Velocity:     2.77 m/s Est. RA Pressure:     3 mmHg RV Syst Pressure:     34 mmHg  (< 30mmHg) IVC Diam:             1.80 cm  PULMONARY VEINS: PulmV A Revs Dur: 92.00 msec PulmV A Revs Raj: 34.80 cm/s PulmV Crane Raj:   41.40 cm/s PulmV S/D Raj:    0.90 PulmV Sys Raj:    38.60 cm/s  [id number] Alexandru Kaba MD Electronically signed on 2025 at 4:13:10 PM  ** Final **     ECG 12 lead    Result Date: 2025  Normal sinus rhythm T wave abnormality, consider inferior ischemia Abnormal ECG When compared with ECG of 18-MAY-2010 14:35, Nonspecific T wave abnormality now evident in Anterolateral leads    CT ankle right wo IV contrast    Result Date: 2025  Interpreted By:  Wayne Ceron, STUDY: CT ANKLE RIGHT WO IV CONTRAST; ;  2025 11:20 pm   INDICATION: Signs/Symptoms:Trauma.     COMPARISON: None.   ACCESSION NUMBER(S): [id number]   ORDERING CLINICIAN: EIRC HUERTA   TECHNIQUE:  Noncontrast CT of the right ankle with multiplanar reformations.   FINDINGS: Acute mildly comminuted oblique fracture of the lateral malleolus at and above the level of the syndesmosis. There is lateral displacement of 1 cortical thickness and associated widening of the medial ankle mortise.   There are small ossific fragments posterior and medial to the medial aspect of the navicular bone (series 201, images 85-87), that may relate to accessory ossicles or displaced avulsion fracture fragments; please correlate clinically.   Small ossific fragment ventral to the distal tibial epiphysis, compatible with displaced avulsion fracture (series 204, images 51 and 52).   Tiny ossific fragment seen along the lateral process of the calcaneus, suspicious for small avulsion fracture (series 203, image 51).   Cortical indistinctness, lucency, depression and irregularity involving the medial and lateral anterior aspects of the talar dome (series 204, images 41-45; 53-56), and also involving the medial aspect of the distal tibial epiphysis, suspicious for acute impaction fracture.   ORIF changes to the distal 1st through 3rd metatarsals associated with healed and remodeled fractures, without definite evidence of hardware complication.   Soft tissue swelling and edema. No subcutaneous air or foreign body.       Please see above.     MACRO: None   Signed by: Wayne Ceron 2/18/2025 12:11 AM Dictation workstation:   [id number]    CT head wo IV contrast    Result Date: 2/18/2025  Interpreted By:  Wayne Ceron, STUDY: CT HEAD WO IV CONTRAST; CT CERVICAL SPINE WO IV CONTRAST; ; 2/17/2025 11:20 pm   INDICATION: Signs/Symptoms:Trauma.     COMPARISON: None.   ACCESSION NUMBER(S): [id number]; [id number]   ORDERING CLINICIAN: ERIC HUERTA   TECHNIQUE: Noncontrast CT exams of the head and cervical spine with multiplanar reformations.   FINDINGS: BRAIN PARENCHYMA: Moderate volume loss.  There is patchy white matter hypoattenuation,  nonspecific, but most often attributable to chronic microvascular ischemic change. Gray-white matter interfaces are preserved. No mass, mass effect or midline shift.   HEMORRHAGE: No acute intracranial hemorrhage. VENTRICLES and EXTRA-AXIAL SPACES: Normal size. EXTRACRANIAL SOFT TISSUES: Within normal limits. PARANASAL SINUSES/MASTOIDS: The visualized paranasal sinuses and mastoid air cells are aerated. CALVARIUM: No depressed skull fracture. No destructive osseous lesion.   OTHER FINDINGS: None.   CERVICAL SPINE:   ALIGNMENT: Grade 1 degenerative anterolisthesis at C7-T1. Alignment is otherwise maintained. VERTEBRAE: No acute fracture. Vertebral stature is maintained. Endplate osteophytosis and irregularity and sclerosis associated with severe degenerative disc height loss at C6-C7 greater than C5-C6. Moderate hypertrophic facet osteoarthropathy. SPINAL CANAL: No critical spinal canal stenosis. PREVERTEBRAL SOFT TISSUES: No prevertebral soft tissue swelling. LUNG APICES: Imaged portion of the lung apices are within normal limits.   OTHER FINDINGS: None.       No evidence of acute intracranial abnormality.   No acute cervical spine fracture or malalignment.     MACRO: None   Signed by: Wayne Ceron 2/18/2025 12:02 AM Dictation workstation:   [id number]    CT cervical spine wo IV contrast    Result Date: 2/18/2025  Interpreted By:  Wayne Ceron, STUDY: CT HEAD WO IV CONTRAST; CT CERVICAL SPINE WO IV CONTRAST; ; 2/17/2025 11:20 pm   INDICATION: Signs/Symptoms:Trauma.     COMPARISON: None.   ACCESSION NUMBER(S): [id number]; [id number]   ORDERING CLINICIAN: ERIC HUERTA   TECHNIQUE: Noncontrast CT exams of the head and cervical spine with multiplanar reformations.   FINDINGS: BRAIN PARENCHYMA: Moderate volume loss.  There is patchy white matter hypoattenuation, nonspecific, but most often attributable to chronic microvascular ischemic change. Gray-white matter interfaces are preserved. No mass, mass effect or  midline shift.   HEMORRHAGE: No acute intracranial hemorrhage. VENTRICLES and EXTRA-AXIAL SPACES: Normal size. EXTRACRANIAL SOFT TISSUES: Within normal limits. PARANASAL SINUSES/MASTOIDS: The visualized paranasal sinuses and mastoid air cells are aerated. CALVARIUM: No depressed skull fracture. No destructive osseous lesion.   OTHER FINDINGS: None.   CERVICAL SPINE:   ALIGNMENT: Grade 1 degenerative anterolisthesis at C7-T1. Alignment is otherwise maintained. VERTEBRAE: No acute fracture. Vertebral stature is maintained. Endplate osteophytosis and irregularity and sclerosis associated with severe degenerative disc height loss at C6-C7 greater than C5-C6. Moderate hypertrophic facet osteoarthropathy. SPINAL CANAL: No critical spinal canal stenosis. PREVERTEBRAL SOFT TISSUES: No prevertebral soft tissue swelling. LUNG APICES: Imaged portion of the lung apices are within normal limits.   OTHER FINDINGS: None.       No evidence of acute intracranial abnormality.   No acute cervical spine fracture or malalignment.     MACRO: None   Signed by: Wayne Ceron 2/18/2025 12:02 AM Dictation workstation:   [id number]    CT abdomen pelvis w IV contrast    Result Date: 2/17/2025  Interpreted By:  Wayne Ceron, STUDY: CT ABDOMEN PELVIS W IV CONTRAST; ;  2/17/2025 11:19 pm   INDICATION: Signs/Symptoms:LLQ tenderness, significant watery stools recurrent since 5:30 PM, assess for diverticulitis.     COMPARISON: None.   ACCESSION NUMBER(S): [id number]   ORDERING CLINICIAN: ERIC HUERTA   TECHNIQUE: Axial CT images of the abdomen and pelvis with coronal and sagittal reconstructed images performed after intravenous administration of 75 cc Omnipaque 350.   FINDINGS: LOWER CHEST: No acute abnormality of the lung bases. Mild platelike atelectasis or scarring in both lung bases. Normal heart size. Submucosal edema in the distal esophagus, compatible with esophagitis; correlate clinically with GERD. BONES: No acute osseous abnormality.  Grade 1 degenerative anterolisthesis at L1-L2 through L3-L4. Moderate degenerative changes of the lumbar spine. Mild-to-moderate bilateral hip osteoarthropathy. ABDOMINAL WALL: Small fat containing umbilical hernia.   ABDOMEN:   LIVER: Mildly enlarged. No focal lesion. BILE DUCTS: Normal caliber. GALLBLADDER: Cholelithiasis. No wall thickening or pericholecystic fluid or inflammation. PANCREAS: Within normal limits. SPLEEN: Within normal limits. ADRENALS: Within normal limits. KIDNEYS and URETERS: Within normal limits.     VESSELS: Mild atherosclerosis. No aneurysmal dilation. RETROPERITONEUM: No pathologically enlarged retroperitoneal lymph nodes.   PELVIS:   REPRODUCTIVE ORGANS: Prostate is not enlarged. Metallic densities are present in the prostate. BLADDER: Suboptimally evaluated due to incomplete distention, without definite abnormality.   BOWEL: Stomach appears within normal limits. No dilated or thickened small bowel. There is diffuse haustral thickening throughout the colon, submucosal edema, compatible with colitis. Colonic diverticulosis without convincing evidence of acute diverticulitis. Normal appendix. PERITONEUM: No ascites or free air, no fluid collection.       There is diffuse haustral thickening throughout the colon, submucosal edema, compatible with uncomplicated acute colitis. Colonic diverticulosis without convincing evidence of acute diverticulitis.   No other evidence of acute pathology.   Cholelithiasis without evidence of acute cholecystitis.   Mild atherosclerosis.   Additional findings as discussed above.     MACRO: None   Signed by: Wayne Ceron 2/17/2025 11:55 PM Dictation workstation:   [id number]    XR chest 1 view    Result Date: 2/17/2025  Interpreted By:  Elmo Youssef, STUDY: XR CHEST 1 VIEW;  2/17/2025 9:39 pm   INDICATION: Signs/Symptoms:Syncope.     COMPARISON: None.   ACCESSION NUMBER(S): [id number]   ORDERING CLINICIAN: ERIC HUERTA   FINDINGS:         CARDIOMEDIASTINAL  SILHOUETTE: Cardiomediastinal silhouette is normal in size and configuration.   LUNGS: Lungs are clear.   ABDOMEN: No remarkable upper abdominal findings.   BONES: No acute osseous changes.       1.  No evidence of acute cardiopulmonary process.       MACRO: None   Signed by: Elmo Youssef 2/17/2025 9:41 PM Dictation workstation:   [id number]    XR foot right 3+ views    Result Date: 2/17/2025  Interpreted By:  Elmo Youssef, STUDY: XR FOOT RIGHT 3+ VIEWS; ;  2/17/2025 9:21 pm   INDICATION: Signs/Symptoms:Trauma.     COMPARISON: None.   ACCESSION NUMBER(S): [id number]   ORDERING CLINICIAN: ERIC BRADLEY   FINDINGS:   Right ankle and right foot, three views of each   There is a mildly displaced oblique fracture through the distal fibula. There is widening of the medial clear space. There is severe bimalleolar soft tissue edema. No definite fracture seen in the tibia   Postsurgical changes in the foot to include osteotomy with fixation in the 1st, 2nd and 3rd metatarsal heads and the base of the 1st proximal phalanx. Moderate degenerative changes in the 1st, 2nd 3rd MTP joints.       Mildly displaced fracture through the distal fibula. Widening of the medial clear space compatible with underlying ligamentous injury. Bimalleolar soft tissue edema. Postsurgical changes of the forefoot without acute abnormality   MACRO: None   Signed by: Elmo Youssef 2/17/2025 9:26 PM Dictation workstation:   [id number]    XR ankle right 3+ views    Result Date: 2/17/2025  Interpreted By:  Elmo Youssef, STUDY: XR ANKLE RIGHT 3+ VIEWS; ;  2/17/2025 9:21 pm   INDICATION: Signs/Symptoms:Trauma.     COMPARISON: None.   ACCESSION NUMBER(S): [id number]   ORDERING CLINICIAN: ERIC BRADLEY   FINDINGS: Right ankle and right foot, three views of each   There is a mildly displaced oblique fracture through the distal fibula. There is widening of the medial clear space. There is severe bimalleolar soft tissue edema. No definite  fracture seen in the tibia   Postsurgical changes in the foot to include osteotomy with fixation in the 1st, 2nd and 3rd metatarsal heads and the base of the 1st proximal phalanx. Moderate degenerative changes in the 1st, 2nd 3rd MTP joints.         Mildly displaced fracture through the distal fibula. Widening of the medial clear space compatible with underlying ligamentous injury. Bimalleolar soft tissue edema. Postsurgical changes of the forefoot without acute abnormality     MACRO: None   Signed by: Elmo Youssef 2/17/2025 9:26 PM Dictation workstation:   [id number]      Medications:  Scheduled medications  amLODIPine, 2.5 mg, oral, Daily  [Held by provider] heparin (porcine), 5,000 Units, subcutaneous, q8h JANAE  pantoprazole, 40 mg, intravenous, BID  rosuvastatin, 5 mg, oral, Every other day  tamsulosin, 0.4 mg, oral, Nightly      Continuous medications     PRN medications  PRN medications: acetaminophen **OR** acetaminophen **OR** acetaminophen, oxyCODONE    Assessment    Assessment & Plan   Mr. Julio Miller is a 74 y.o. male who presents with abdominal pain, diarrhea, syncope    Assessment & Plan  Closed fracture of right ankle, initial encounter    Syncope          #Bloody BM c/f LGIB   #Colitis, possible viral gastroenteritis   -Documented photos showed BRBPR; Hgb 12.5 today, baseline 13-14   -CT as above showing colitis, but workup for infectious colitis negative thus far   -Prior colonoscopy (2022) showed internal hemorrhoids  Plan:   -Plan for Colonoscopy tomorrow  -Bowel prep ordered   -Will add fecal calprotectin  -NPO after midnight  -Trend CBC, transfuse to maintain Hgb >7       Case seen and discussed with Dr. Sam Gracia DO  PGY-3 Internal Medicine  This note has been transcribed using Dragon voice recognition system and there is a possibility of unintentional typing misprints.  Any information found to be copied from previous providers is done in the best interest of the patient to  provide accurate, quality, and continuity of care.

## 2025-02-19 NOTE — ANESTHESIA PREPROCEDURE EVALUATION
Patient: Julio Miller    Procedure Information       Anesthesia Start Date/Time: 02/19/25 1442    Procedure: ORIF, ANKLE (Right: Ankle)    Location: [facility] OR 07 / [facility]    Surgeons: Arsenio Lopes MD            Relevant Problems   Cardiac   (+) Aortic valve stenosis   (+) Atherosclerotic heart disease of native coronary artery without angina pectoris   (+) Benign essential hypertension   (+) Heart murmur   (+) Hyperlipidemia   (+) Hypertension      Neuro   (+) Depression   (+) Mild major depression (CMS-HCC)   (+) Barr's neuroma      GI   (+) GERD (gastroesophageal reflux disease)      /Renal   (+) Prostate cancer (Multi)       Clinical information reviewed:   Tobacco  Allergies  Meds   Med Hx  Surg Hx   Fam Hx  Soc Hx        NPO Detail:  No data recorded     Physical Exam    Airway  Mallampati: II  TM distance: >3 FB  Neck ROM: full     Cardiovascular - normal exam     Dental    Pulmonary - normal exam     Abdominal - normal exam             Anesthesia Plan    History of general anesthesia?: yes  History of complications of general anesthesia?: no    ASA 3 - emergent     general and regional   (CT chest on 2/18 showed No acute pulmonary embolism. )  intravenous induction   Anesthetic plan and risks discussed with patient.    Plan discussed with CRNA.

## 2025-02-19 NOTE — ANESTHESIA PROCEDURE NOTES
Airway  Date/Time: 2/19/2025 2:52 PM  Urgency: elective    Airway not difficult    Staffing  Performed: GAUTAM   Authorized by: DEANNE Kelly    Performed by: DEANNE Kelly  Patient location during procedure: OR    Indications and Patient Condition  Indications for airway management: anesthesia  Spontaneous Ventilation: absent  Sedation level: deep  Preoxygenated: yes  Patient position: sniffing  MILS maintained throughout  Mask difficulty assessment: 1 - vent by mask    Final Airway Details  Final airway type: supraglottic airway      Successful airway: classic  Size 4     Number of attempts at approach: 1

## 2025-02-19 NOTE — DOCUMENTATION CLARIFICATION NOTE
"    PATIENT:               SILVIO BARRAZA  ACCT #:                  [id number]  MRN:                       [id number]  :                       1950  ADMIT DATE:       2025 7:49 PM  DISCH DATE:  RESPONDING PROVIDER #:        [id number]          PROVIDER RESPONSE TEXT:    Thrombocytopenia is clinically significant and required treatment/monitoring    CDI QUERY TEXT:    Clarification    Instruction:    Based on your assessment of the patient and the clinical information, please provide the requested documentation by clicking on the appropriate radio button and enter any additional information if prompted.    Question: Is there a diagnosis indicative of the lab values and clinical findings    When answering this query, please exercise your independent professional judgment. The fact that a question is being asked, does not imply that any particular answer is desired or expected.    The patient's clinical indicators include:  Clinical Information:  Admitted for Displaced Right Bimalleolar Fracture, Viral GE, ASHANTI.    Clinical Indicators:  Platelets 144, 121, 135 from -.    HP by IM  0131 AM states \"Viral gastroenteritis\".    Treatment: Daily lab monitoring of CBC.  LR, NS IVFs.    Risk Factors: Elderly male presenting w/Viral Gastroenteritis.  Options provided:  -- Thrombocytopenia is clinically significant and required treatment/monitoring  -- Low platelets, clinically insignificant  -- Other - I will add my own diagnosis  -- Refer to Clinical Documentation Reviewer    Query created by: Nathaly Dennis on 2025 3:16 PM      Electronically signed by:  SHAYAN CARDENAS MD 2025 3:25 PM          "

## 2025-02-19 NOTE — DISCHARGE INSTRUCTIONS
Orthopedically the patient is nonweightbearing in the right lower extremity.  May elevate the leg is much as possible ice as needed directly over the right ankle.  Strict nonweightbearing on the right lower extremity.  Follow-up in the office in 7 to 10 days for suture removal and a wound check and cast application.  Call [phone] for an appointment    Please call and follow up with your primary care provider (PCP) to review hospital course.  Please follow-up with the GI appointment with Dr. Espinoza for an outpatient follow-up for outpatient colonoscopy.     Please take all of your medications as directed.     New medications:    Aspirin 81 mg twice daily for 14 days  Oxycodone 5 mg as needed for pain  Pantoprazole 40 mg once daily    If you have any concerning symptoms, or worsening symptoms please call or return, such as chest pain, shortness of breath, new onset confusion, loss of sensation, or fever >103F.    Thank you for allowing us to participate in your health care.    -[facility].

## 2025-02-19 NOTE — PROGRESS NOTES
Patient successfully underwent an open reduction internal fixation of his right ankle.  The patient will return to the floor after the recovery room.  Orthopedically the patient is okay for discharge to home.  Follow-up in my office in 10 days.  Call [phone] for such appointment.  Patient should elevate and ice his leg is much as possible in the interim.  He strictly nonweightbearing on the right lower extremity with crutches or a walker.    Arsenio Lopes MD

## 2025-02-19 NOTE — ANESTHESIA POSTPROCEDURE EVALUATION
Patient: Julio Miller    Procedure Summary       Date: 02/19/25 Room / Location: [facility] / [facility]    Anesthesia Start: 1442 Anesthesia Stop: 1603    Procedure: ORIF, ANKLE (Right: Ankle) Diagnosis:       Closed fracture of right ankle, initial encounter      (S82.891A)    Surgeons: Arsenio Lopes MD Responsible Provider: Sarthak Sarmiento DO    Anesthesia Type: general ASA Status: 3 - Emergent            Anesthesia Type: general    Vitals Value Taken Time   /65 02/19/25 1601   Temp 36.5 02/19/25 1603   Pulse 74 02/19/25 1603   Resp 12 02/19/25 1603   SpO2 99 02/19/25 1603   Vitals shown include unfiled device data.    Anesthesia Post Evaluation    Patient location during evaluation: PACU  Patient participation: complete - patient participated  Level of consciousness: awake and alert  Pain score: 0  Pain management: adequate  Multimodal analgesia pain management approach  Airway patency: patent  Cardiovascular status: acceptable, blood pressure returned to baseline and hemodynamically stable  Respiratory status: acceptable, face mask, nonlabored ventilation, spontaneous ventilation and unassisted  Hydration status: acceptable  Postoperative Nausea and Vomiting: none  Comments: Handoff to PACU RN        There were no known notable events for this encounter.     dietitian/nutrition services

## 2025-02-19 NOTE — PROGRESS NOTES
02/19/25 1202   Discharge Planning   Living Arrangements Spouse/significant other   Support Systems Spouse/significant other   Type of Residence Private residence   Number of Stairs to Enter Residence 3   Number of Stairs Within Residence 12   Home or Post Acute Services Other (Comment)  (Outpatient therapy)   Expected Discharge Disposition Home   Does the patient need discharge transport arranged? No   Financial Resource Strain   How hard is it for you to pay for the very basics like food, housing, medical care, and heating? Not hard   Housing Stability   In the last 12 months, was there a time when you were not able to pay the mortgage or rent on time? N   At any time in the past 12 months, were you homeless or living in a shelter (including now)? N   Transportation Needs   In the past 12 months, has lack of transportation kept you from medical appointments or from getting medications? no   In the past 12 months, has lack of transportation kept you from meetings, work, or from getting things needed for daily living? No   Stroke Family Assessment   Stroke Family Assessment Needed No   Intensity of Service   Intensity of Service 0-30 min     Met with patient and spouse at the bedside, confirmed demographics, insurance, and pcp is Dr. Hudson. Patient was independent in his care needs prior to this admission. He denies any financial concerns, able to purchase his medications, and takes as ordered. He will discharge home after surgery with his wife. His wife is available to assist as needed.

## 2025-02-19 NOTE — OP NOTE
ORIF, ANKLE (R) Operative Note     Date: 2025 - 2025  OR Location: [facility] OR    Name: Julio Miller : 1950, Age: 74 y.o., MRN: [id number], Sex: male    Diagnosis  Pre-op Diagnosis      * Closed fracture of right ankle, initial encounter [S82.891A] Post-op Diagnosis     * Closed fracture of right ankle, initial encounter [S82.891A]     Procedures  ORIF, ANKLE  [id number] - IA OPEN TX DISTAL FIBULAR FRACTURE LAT MALLEOLUS  Right distal fibula open reduction internal fixation    Surgeons      * Arsenio Lopes - Primary    Resident/Fellow/Other Assistant:  Surgeons and Role:  * No surgeons found with a matching role *    Staff:   Circulator: Mirela Aldridgeub Person: Sadiq  Surgical Assistant: Cash  Circulator: Fannie Wooten Person: Pearl    Anesthesia Staff: Anesthesiologist: Sarthak Sarmiento DO  CRNA: PATTI Kelly-CRNA  SRNA: Marsha Laird RN    Procedure Summary  Anesthesia: Anesthesia type not filed in the log.  ASA: ASA status not filed in the log.  Estimated Blood Loss: 0 mL  Intra-op Medications:   Administrations occurring from 1430 to 1645 on 25:   Medication Name Total Dose   bisacodyl (Dulcolax) EC tablet 10 mg Cannot be calculated   ceFAZolin (Ancef) IV 2 g in 100 mL dextrose (iso) - premix 2 g   dexAMETHasone (Decadron) injection 4 mg/mL 4 mg   electrolyte-A (Plasmalyte-A) Cannot be calculated   ePHEDrine injection 10 mg   fentaNYL (Sublimaze) injection 50 mcg/mL 25 mcg   lidocaine PF (Xylocaine-MPF) local injection 2 % 80 mg   metoprolol tartrate (Lopressor) injection 5 mg   ondansetron (Zofran) 2 mg/mL injection 4 mg   pantoprazole (ProtoNix) injection 40 mg Cannot be calculated   phenylephrine 100 mcg/mL syringe 10 mL (prefilled) 400 mcg   polyethylene glycol (Glycolax, Miralax) powder 238 g Cannot be calculated   propofol (Diprivan) injection 10 mg/mL 150 mg              Anesthesia Record               Intraprocedure I/O Totals       None           Specimen: No specimens  collected              Drains and/or Catheters: * None in log *    Tourniquet Times:     Total Tourniquet Time Documented:  Thigh (Right) - 44 minutes  Total: Thigh (Right) - 44 minutes      Implants:  Implants       Type Name Action Serial No.      Screw SCREW, CORTICAL, SELF-TAPPING, 3.5 X 18 MM, STAINLESS STEEL - [id number] Implanted      Screw SCREW, CORTICAL, SELF-TAPPING, 3.5 X 16 MM, STAINLESS STEEL - [id number] Implanted      Screw SCREW, CORTICAL, SELF-TAPPING, 3.5 X 14 MM, STAINLESS STEEL - [id number] Implanted      Screw PLATE LCP TUBULAR 1/3 81MM 7H - [id number] Implanted               Findings: Displaced oblique fracture fibula  Complications:  None; patient tolerated the procedure well.    Disposition: PACU - hemodynamically stable.  Condition: stable     Brief clinical note:    Patient presents with a distal fibula fracture.  The patient is obtain medical clearance.  The patient presents today for definitive fixation in the form of a plate screw construct.  The risks and benefits of surgery were discussed.  He understands IntraOp decision made.  He does understand the nonoperative option which was also offered as well.  He wished to proceed operatively.  He understands the risks to include but not limited to injury soft tissue nerves and vessels numbness around the incision failure fixation hardware related pain infection incisional problems medical and anesthetic risks.  He wished to proceed.  He is consented and marked.    Operative note:    Patient is taken the operative and anesthesia was ministered the extremities prepped and draped in usual manner.  A final timeout is done with the operative team.  Esmarch sang ration is done the tourniquet is inflated.  Incision is carried over the fracture centered centrally.  Dissection is carried out proximally and distally.  The fracture was exposed.  The hematoma was evacuated.  Reduction clamp is reduce the fracture.  The reduction is then help with a lag  screw placed from dorsal to volar.  This is across the oblique fracture.  Fixation is then obtained with the plate construct.  This is done with 2 locking screws distally and additional bicortical screw distally.  This is followed by 3 bicortical screws proximally.  Good fixation was obtained.  Final x-rays were taken.  Fortunately the mortise is shifted nicely back into position with such reduction precluding the need for syndesmotic screw as the patient does not open up with any type of load.  Final x-rays are taken in AP and lateral planes.  Wounds irrigated.  The closure over the plates done with Vicryl suture.  Once that is done the skin is approximated and closed in 2 layers with Monocryl and nylon.  The patient received a preoperative block and therefore no Marcaine was utilized.  Dressings include Xeroform fluffs web roll and well-padded posterior splint with a side splint attachment.  The patient tolerated procedure well without complication.  Disposition will be the recovery room and to the floor.  Orthopedically the patient is okay for discharge.  The patient received antibiotic preoperatively.  Arsenio Lopes  Phone Number: [phone]

## 2025-02-19 NOTE — ANESTHESIA PROCEDURE NOTES
Peripheral Block    Patient location during procedure: pre-op  Reason for block: at surgeon's request and post-op pain management  Staffing  Performed: attending   Authorized by: Sarthak Sarmiento DO    Performed by: Sarthak Sarmiento DO  Preanesthetic Checklist  Completed: patient identified, IV checked, site marked, risks and benefits discussed, surgical consent, monitors and equipment checked, pre-op evaluation and timeout performed   Timeout performed at:   Peripheral Block  Patient position: laying flat  Prep: ChloraPrep  Patient monitoring: continuous pulse ox  Block type: popliteal  Laterality: right  Injection technique: single-shot  Guidance: ultrasound guided  Local infiltration: ropivacaine  Infiltration strength: 0.5 %  Dose: 20 mL  Needle  Needle localization: anatomical landmarks and ultrasound guidance  Assessment  Injection assessment: negative aspiration for heme, no paresthesia on injection, incremental injection and local visualized surrounding nerve on ultrasound  Paresthesia pain: none  Heart rate change: no  Slow fractionated injection: yes

## 2025-02-20 ENCOUNTER — ANESTHESIA EVENT (OUTPATIENT)
Dept: GASTROENTEROLOGY | Facility: HOSPITAL | Age: 75
End: 2025-02-20
Payer: MEDICARE

## 2025-02-20 ENCOUNTER — ANESTHESIA (OUTPATIENT)
Dept: GASTROENTEROLOGY | Facility: HOSPITAL | Age: 75
End: 2025-02-20
Payer: MEDICARE

## 2025-02-20 ENCOUNTER — APPOINTMENT (OUTPATIENT)
Dept: GASTROENTEROLOGY | Facility: HOSPITAL | Age: 75
DRG: 493 | End: 2025-02-20
Payer: MEDICARE

## 2025-02-20 LAB
ALBUMIN SERPL BCP-MCNC: 3.9 G/DL (ref 3.4–5)
ANION GAP SERPL CALC-SCNC: 18 MMOL/L (ref 10–20)
BUN SERPL-MCNC: 14 MG/DL (ref 6–23)
CALCIUM SERPL-MCNC: 8.6 MG/DL (ref 8.6–10.3)
CHLORIDE SERPL-SCNC: 101 MMOL/L (ref 98–107)
CO2 SERPL-SCNC: 19 MMOL/L (ref 21–32)
CREAT SERPL-MCNC: 1.31 MG/DL (ref 0.5–1.3)
EGFRCR SERPLBLD CKD-EPI 2021: 57 ML/MIN/1.73M*2
ERYTHROCYTE [DISTWIDTH] IN BLOOD BY AUTOMATED COUNT: 12 % (ref 11.5–14.5)
GLUCOSE SERPL-MCNC: 118 MG/DL (ref 74–99)
HCT VFR BLD AUTO: 42.3 % (ref 41–52)
HGB BLD-MCNC: 13 G/DL (ref 13.5–17.5)
LACTATE SERPL-SCNC: 0.9 MMOL/L (ref 0.4–2)
MAGNESIUM SERPL-MCNC: 2.23 MG/DL (ref 1.6–2.4)
MCH RBC QN AUTO: 30.8 PG (ref 26–34)
MCHC RBC AUTO-ENTMCNC: 30.7 G/DL (ref 32–36)
MCV RBC AUTO: 100 FL (ref 80–100)
NRBC BLD-RTO: 0 /100 WBCS (ref 0–0)
PHOSPHATE SERPL-MCNC: 4.2 MG/DL (ref 2.5–4.9)
PLATELET # BLD AUTO: 111 X10*3/UL (ref 150–450)
POTASSIUM SERPL-SCNC: 4.4 MMOL/L (ref 3.5–5.3)
RBC # BLD AUTO: 4.22 X10*6/UL (ref 4.5–5.9)
SODIUM SERPL-SCNC: 134 MMOL/L (ref 136–145)
WBC # BLD AUTO: 7.2 X10*3/UL (ref 4.4–11.3)

## 2025-02-20 PROCEDURE — 99233 SBSQ HOSP IP/OBS HIGH 50: CPT

## 2025-02-20 PROCEDURE — 2500000001 HC RX 250 WO HCPCS SELF ADMINISTERED DRUGS (ALT 637 FOR MEDICARE OP)

## 2025-02-20 PROCEDURE — 97112 NEUROMUSCULAR REEDUCATION: CPT | Mod: GO

## 2025-02-20 PROCEDURE — 97535 SELF CARE MNGMENT TRAINING: CPT | Mod: GO

## 2025-02-20 PROCEDURE — 97161 PT EVAL LOW COMPLEX 20 MIN: CPT | Mod: GP

## 2025-02-20 PROCEDURE — 83605 ASSAY OF LACTIC ACID: CPT

## 2025-02-20 PROCEDURE — 97165 OT EVAL LOW COMPLEX 30 MIN: CPT | Mod: GO

## 2025-02-20 PROCEDURE — 80069 RENAL FUNCTION PANEL: CPT | Performed by: ORTHOPAEDIC SURGERY

## 2025-02-20 PROCEDURE — 2500000004 HC RX 250 GENERAL PHARMACY W/ HCPCS (ALT 636 FOR OP/ED)

## 2025-02-20 PROCEDURE — 36415 COLL VENOUS BLD VENIPUNCTURE: CPT

## 2025-02-20 PROCEDURE — 2500000001 HC RX 250 WO HCPCS SELF ADMINISTERED DRUGS (ALT 637 FOR MEDICARE OP): Performed by: STUDENT IN AN ORGANIZED HEALTH CARE EDUCATION/TRAINING PROGRAM

## 2025-02-20 PROCEDURE — 1200000002 HC GENERAL ROOM WITH TELEMETRY DAILY

## 2025-02-20 PROCEDURE — 36415 COLL VENOUS BLD VENIPUNCTURE: CPT | Performed by: ORTHOPAEDIC SURGERY

## 2025-02-20 PROCEDURE — 99231 SBSQ HOSP IP/OBS SF/LOW 25: CPT | Performed by: PHYSICIAN ASSISTANT

## 2025-02-20 PROCEDURE — 97116 GAIT TRAINING THERAPY: CPT | Mod: GP

## 2025-02-20 PROCEDURE — 85027 COMPLETE CBC AUTOMATED: CPT | Performed by: ORTHOPAEDIC SURGERY

## 2025-02-20 PROCEDURE — 83735 ASSAY OF MAGNESIUM: CPT | Performed by: ORTHOPAEDIC SURGERY

## 2025-02-20 RX ORDER — SODIUM CHLORIDE, SODIUM LACTATE, POTASSIUM CHLORIDE, CALCIUM CHLORIDE 600; 310; 30; 20 MG/100ML; MG/100ML; MG/100ML; MG/100ML
100 INJECTION, SOLUTION INTRAVENOUS CONTINUOUS
Status: ACTIVE | OUTPATIENT
Start: 2025-02-20 | End: 2025-02-20

## 2025-02-20 RX ORDER — PANTOPRAZOLE SODIUM 40 MG/1
40 TABLET, DELAYED RELEASE ORAL
Status: DISCONTINUED | OUTPATIENT
Start: 2025-02-21 | End: 2025-02-21 | Stop reason: HOSPADM

## 2025-02-20 RX ORDER — CALCIUM CARBONATE 200(500)MG
500 TABLET,CHEWABLE ORAL ONCE
Status: COMPLETED | OUTPATIENT
Start: 2025-02-20 | End: 2025-02-20

## 2025-02-20 RX ORDER — NAPROXEN SODIUM 220 MG/1
81 TABLET, FILM COATED ORAL 2 TIMES DAILY
Status: DISCONTINUED | OUTPATIENT
Start: 2025-02-20 | End: 2025-02-21 | Stop reason: HOSPADM

## 2025-02-20 RX ADMIN — PANTOPRAZOLE SODIUM 40 MG: 40 INJECTION, POWDER, FOR SOLUTION INTRAVENOUS at 08:40

## 2025-02-20 RX ADMIN — SODIUM CHLORIDE, POTASSIUM CHLORIDE, SODIUM LACTATE AND CALCIUM CHLORIDE 100 ML/HR: 600; 310; 30; 20 INJECTION, SOLUTION INTRAVENOUS at 08:40

## 2025-02-20 RX ADMIN — AMLODIPINE BESYLATE 2.5 MG: 2.5 TABLET ORAL at 08:40

## 2025-02-20 RX ADMIN — CALCIUM CARBONATE (ANTACID) CHEW TAB 500 MG 500 MG: 500 CHEW TAB at 20:40

## 2025-02-20 RX ADMIN — ASPIRIN 81 MG CHEWABLE TABLET 81 MG: 81 TABLET CHEWABLE at 20:43

## 2025-02-20 RX ADMIN — OXYCODONE HYDROCHLORIDE 5 MG: 5 TABLET ORAL at 16:27

## 2025-02-20 ASSESSMENT — COGNITIVE AND FUNCTIONAL STATUS - GENERAL
STANDING UP FROM CHAIR USING ARMS: A LITTLE
HELP NEEDED FOR BATHING: A LITTLE
MOBILITY SCORE: 18
CLIMB 3 TO 5 STEPS WITH RAILING: A LITTLE
DAILY ACTIVITIY SCORE: 20
MOVING TO AND FROM BED TO CHAIR: A LITTLE
TURNING FROM BACK TO SIDE WHILE IN FLAT BAD: A LITTLE
WALKING IN HOSPITAL ROOM: A LITTLE
HELP NEEDED FOR BATHING: A LITTLE
MOBILITY SCORE: 18
MOVING FROM LYING ON BACK TO SITTING ON SIDE OF FLAT BED WITH BEDRAILS: A LITTLE
STANDING UP FROM CHAIR USING ARMS: A LITTLE
STANDING UP FROM CHAIR USING ARMS: A LITTLE
WALKING IN HOSPITAL ROOM: A LITTLE
PERSONAL GROOMING: A LITTLE
EATING MEALS: A LITTLE
PERSONAL GROOMING: A LITTLE
MOVING TO AND FROM BED TO CHAIR: A LITTLE
DRESSING REGULAR UPPER BODY CLOTHING: A LITTLE
DRESSING REGULAR LOWER BODY CLOTHING: A LITTLE
CLIMB 3 TO 5 STEPS WITH RAILING: A LOT
TURNING FROM BACK TO SIDE WHILE IN FLAT BAD: A LITTLE
MOVING TO AND FROM BED TO CHAIR: A LITTLE
TOILETING: A LITTLE
DAILY ACTIVITIY SCORE: 18
WALKING IN HOSPITAL ROOM: A LITTLE
MOBILITY SCORE: 20
TOILETING: A LITTLE
CLIMB 3 TO 5 STEPS WITH RAILING: A LITTLE
DRESSING REGULAR LOWER BODY CLOTHING: A LITTLE

## 2025-02-20 ASSESSMENT — PAIN SCALES - GENERAL
PAINLEVEL_OUTOF10: 0 - NO PAIN
PAINLEVEL_OUTOF10: 7

## 2025-02-20 ASSESSMENT — ACTIVITIES OF DAILY LIVING (ADL)
BATHING_ASSISTANCE: MINIMAL
HOME_MANAGEMENT_TIME_ENTRY: 13

## 2025-02-20 ASSESSMENT — PAIN - FUNCTIONAL ASSESSMENT
PAIN_FUNCTIONAL_ASSESSMENT: 0-10

## 2025-02-20 NOTE — PROGRESS NOTES
Physical Therapy    Physical Therapy Evaluation    Patient Name: Julio Miller  MRN: [id number]  Today's Date: 2/20/2025   Time Calculation  Start Time: 0957  Stop Time: 1028  Time Calculation (min): 31 min  3127/3127-A    Assessment/Plan   PT Assessment: Pt demonstrates impairments listed below.  Pt appears below baseline level of function and based on current level of function, pt would benefit from continued skilled therapy while in the hospital to ensure safety, decrease risk of falls, and regain strength/mobility back to baseline.  Once stable enough for discharge, pt would benefit from low intensity therapy.     PT Assessment Results: Decreased strength, Decreased range of motion, Impaired balance, Decreased mobility  Rehab Prognosis: Good  Evaluation/Treatment Tolerance: Patient tolerated treatment well, Patient limited by fatigue  Medical Staff Made Aware: Yes  End of Session Communication: Bedside nurse  End of Session Patient Position: Up in chair, Alarm on  IP OR SWING BED PT PLAN  Inpatient or Swing Bed: Inpatient  PT Plan  PT Plan: Ongoing PT  PT Frequency: Daily  Equipment Recommended upon Discharge: Wheeled walker  PT Recommended Transfer Status: Contact guard  PT - OK to Discharge: Yes - To next level of care when cleared by medical team    Subjective     Current Problem:  1. Closed fracture of right ankle, initial encounter  Referral to Orthopaedic Surgery    CANCELED: Case Request Operating Room: ORIF, ANKLE    CANCELED: Case Request Operating Room: ORIF, ANKLE    CANCELED: Case Request Operating Room: ORIF, ANKLE    CANCELED: Case Request Operating Room: ORIF, ANKLE      2. Syncope, unspecified syncope type  Transthoracic Echo (TTE) Limited    Transthoracic Echo (TTE) Limited      3. ASHANTI (acute kidney injury) (CMS-HCC)        4. Lower GI bleed  Colonoscopy Diagnostic          Past Medical History:  Patient Active Problem List   Diagnosis    Abnormal echocardiogram    Acquired abduction deformity of  right foot    Aortic valve stenosis    Atherosclerotic heart disease of native coronary artery without angina pectoris    Benign essential hypertension    Capsulitis of foot, right    Depression    Fat pad atrophy of foot    Fatigue    Generalized weakness    GERD (gastroesophageal reflux disease)    Heart murmur    Hemorrhoids    Hyperlipidemia    Hypertension    Hypotestosteronism    Low back pain    Low testosterone    Metatarsalgia of right foot    Barr's neuroma    Muscle cramps    Pain of left lower extremity    Palpitations    Prostate cancer (Multi)    Spermatocele    Stage 1 chronic kidney disease    Overactive bladder    Mild major depression (CMS-HCC)    Stage 3a chronic kidney disease (Multi)    Syncope    Closed fracture of right ankle, initial encounter       General Visit Information:  Per EMR: pt presenting with syncope, diarrhea and ankle fracture.  Symptoms started this 2/17/2025 around 4:30 PM where began having sharp left-sided abdominal pain and diffuse watery diarrhea for multiple episodes.  Getting up Stairs lying down the bed then felt like he had to go once again got up started walking on the hallway and felt significantly diaphoretic and passed out.  He hit his head on the wall as well as fell onto his right ankle.  He was able to get up and limp into the bathroom where he had more episodes of watery diarrhea while off sweating.  After these episodes he did notice his severe ankle pain and subsequently called EMS and arrived in our ER.  Patient now no longer complaining of abdominal pain and has not had additional episodes of diarrhea here.  Denies any headache, chest pain, shortness of breath, abdominal pain, burning or discomfort with urination or any blood present in stool.  He has a known sick contact that he saw on Saturday who ended up cooking the food who had similar symptoms of diarrhea and diaphoresis.  He has had no travel, and has otherwise had a normal diet for him.     On  arrival, pt supine in bed.  Pt in no apparent distress and agreeable to therapy.    General  Reason for Referral: recent surgery  Referred By: Shelly Boothe  Prior to Session Communication: Bedside nurse  Patient Position Received: Up in chair, Alarm on    Home Living/PLOF:  Pt lives in house with wife with 1+4 EMANI with rail.  1 floor set up.. Has tub shower without DME.  IND with mobility and ADLs prior to admission.  Owns SPC, FWW, and crutches.  Pt drves.  Shared IADLs with wife.  Pt was active prior to admission.     Precautions:  Precautions  LE Weight Bearing Status: Right Non-Weight Bearing (2/2 R ankle ORIF)  Medical Precautions: Fall precautions     Objective     Pain:  Pain Assessment  Pain Assessment: 0-10  0-10 (Numeric) Pain Score: 0 - No pain    Cognition:  Cognition  Overall Cognitive Status: Within Functional Limits  Orientation Level: Oriented X4    General Assessments:      Activity Tolerance  Endurance: Tolerates 10 - 20 min exercise with multiple rests    Static Sitting Balance  Static Sitting-Comment/Number of Minutes: good  Dynamic Sitting Balance  Dynamic Sitting-Comments: good  Static Standing Balance  Static Standing-Comment/Number of Minutes: good  Dynamic Standing Balance  Dynamic Standing-Comments: fair plus    Extremity/Trunk Assessments:  RLE strength: NT 2/2 being immobilized and splinted; pt able to wiggle toes and perform SLR  LLE strength: WFL    Functional Mobility:  Bed mobility  Supine to sit: SUP    Transfers  Sit to stand: SBA; good maintenance of NWB  Stand to sit: SBA    Ambulation/Stairs  Pt ambulated 3 ft x1, 15 ft x1 with FWW and 3 pt gait pattern; good maintenance of NWB and good balance during ambulation     Pt deferred stair training at this time 2/2 fatigue    Outcome Measures:  [facility] Basic Mobility  Turning from your back to your side while in a flat bed without using bedrails: None  Moving from lying on your back to sitting on the side of a flat bed without using  bedrails: None  Moving to and from bed to chair (including a wheelchair): A little  Standing up from a chair using your arms (e.g. wheelchair or bedside chair): A little  To walk in hospital room: A little  Climbing 3-5 steps with railing: A little  Basic Mobility - Total Score: 20    Goals:  Encounter Problems       Encounter Problems (Active)       PT Problem       Pt will be able to perform all bed mobility tasks with Mod I.  (Progressing)       Start:  02/20/25    Expected End:  03/06/25            Pt will perform all transfers with Mod I and FWW with proper safety mechanics.   (Progressing)       Start:  02/20/25    Expected End:  03/06/25            Pt will ambulate 150 ft with Mod I using FWW for improved functional independence.  (Progressing)       Start:  02/20/25    Expected End:  03/06/25            Pt will be able to negotiate 1+4 steps with 1 HR with SBA.  (Progressing)       Start:  02/20/25    Expected End:  03/06/25            Pt will be able to maintain RLE NWB precautions while performing functional mobility and tasks.  (Progressing)       Start:  02/20/25    Expected End:  03/06/25                 Education Documentation  Precautions, taught by Kathrine Beck PT at 2/20/2025 11:59 AM.  Learner: Patient  Readiness: Acceptance  Method: Explanation  Response: Verbalizes Understanding    Body Mechanics, taught by Kathrine Beck PT at 2/20/2025 11:59 AM.  Learner: Patient  Readiness: Acceptance  Method: Explanation  Response: Verbalizes Understanding    Home Exercise Program, taught by Kathrine Beck PT at 2/20/2025 11:59 AM.  Learner: Patient  Readiness: Acceptance  Method: Explanation  Response: Verbalizes Understanding    Mobility Training, taught by Kathrine Beck PT at 2/20/2025 11:59 AM.  Learner: Patient  Readiness: Acceptance  Method: Explanation  Response: Verbalizes Understanding    Education Comments  No comments found.

## 2025-02-20 NOTE — PROGRESS NOTES
"Julio Miller is a 74 y.o. male on day 2 of admission presenting with Closed fracture of right ankle, initial encounter.    Subjective   Mr. Miller has minimal right ankle pain. He has been elevating his distal right LE.        Objective     Physical Exam  Vitals reviewed.   HENT:      Head: Normocephalic.      Mouth/Throat:      Mouth: Mucous membranes are moist.      Pharynx: Oropharynx is clear.   Eyes:      Extraocular Movements: Extraocular movements intact.   Cardiovascular:      Rate and Rhythm: Normal rate.   Pulmonary:      Effort: Pulmonary effort is normal.   Abdominal:      Palpations: Abdomen is soft.   Musculoskeletal:      Comments: Distal right LE splint dressing is dry and intact.  light touch sensation is intact to toes, motor function present toes, toes are pink with brisk cap refill      Skin:     General: Skin is warm and dry.      Capillary Refill: Capillary refill takes less than 2 seconds.   Neurological:      General: No focal deficit present.      Mental Status: He is alert. Mental status is at baseline.   Psychiatric:         Mood and Affect: Mood normal.         Last Recorded Vitals  Blood pressure 163/78, pulse 74, temperature 37 °C (98.6 °F), temperature source Temporal, resp. rate 18, height 1.702 m (5' 7.01\"), weight 95 kg (209 lb 7 oz), SpO2 98%.  Intake/Output last 3 Shifts:  I/O last 3 completed shifts:  In: 2535 (26.7 mL/kg) [P.O.:600; I.V.:1135 (11.9 mL/kg); IV Piggyback:800]  Out: 1825 (19.2 mL/kg) [Urine:1825 (0.5 mL/kg/hr)]  Weight: 95 kg     Relevant Results      Scheduled medications  amLODIPine, 2.5 mg, oral, Daily  [Held by provider] heparin (porcine), 5,000 Units, subcutaneous, q8h JANAE  pantoprazole, 40 mg, intravenous, BID  rosuvastatin, 5 mg, oral, Every other day  tamsulosin, 0.4 mg, oral, Nightly      Continuous medications  lactated Ringer's, 100 mL/hr, Last Rate: 100 mL/hr (02/20/25 0840)      PRN medications  PRN medications: acetaminophen **OR** acetaminophen " **OR** acetaminophen, oxyCODONE  Results for orders placed or performed during the hospital encounter of 02/17/25 (from the past 24 hours)   POCT GLUCOSE   Result Value Ref Range    POCT Glucose 99 74 - 99 mg/dL   CBC   Result Value Ref Range    WBC 7.2 4.4 - 11.3 x10*3/uL    nRBC 0.0 0.0 - 0.0 /100 WBCs    RBC 4.22 (L) 4.50 - 5.90 x10*6/uL    Hemoglobin 13.0 (L) 13.5 - 17.5 g/dL    Hematocrit 42.3 41.0 - 52.0 %     80 - 100 fL    MCH 30.8 26.0 - 34.0 pg    MCHC 30.7 (L) 32.0 - 36.0 g/dL    RDW 12.0 11.5 - 14.5 %    Platelets 111 (L) 150 - 450 x10*3/uL   Magnesium   Result Value Ref Range    Magnesium 2.23 1.60 - 2.40 mg/dL   Renal Function Panel   Result Value Ref Range    Glucose 118 (H) 74 - 99 mg/dL    Sodium 134 (L) 136 - 145 mmol/L    Potassium 4.4 3.5 - 5.3 mmol/L    Chloride 101 98 - 107 mmol/L    Bicarbonate 19 (L) 21 - 32 mmol/L    Anion Gap 18 10 - 20 mmol/L    Urea Nitrogen 14 6 - 23 mg/dL    Creatinine 1.31 (H) 0.50 - 1.30 mg/dL    eGFR 57 (L) >60 mL/min/1.73m*2    Calcium 8.6 8.6 - 10.3 mg/dL    Phosphorus 4.2 2.5 - 4.9 mg/dL    Albumin 3.9 3.4 - 5.0 g/dL   Lactate   Result Value Ref Range    Lactate 0.9 0.4 - 2.0 mmol/L                   FL fluoro images no charge    Result Date: 2/19/2025  These images are not reportable by radiology and will not be interpreted by  Radiologists.    CT angio chest for pulmonary embolism    Result Date: 2/18/2025  Interpreted By:  Jimenez Hernandez, STUDY: CT ANGIO CHEST FOR PULMONARY EMBOLISM;  2/18/2025 7:51 pm   INDICATION: Signs/Symptoms:Moderately enlarged right ventricle on echo there is suggestive for PE.     COMPARISON: None.   ACCESSION NUMBER(S): [id number]   ORDERING CLINICIAN: SHAYAN CARDENAS   TECHNIQUE: Contiguous axial images of the chest were obtained after the intravenous administration of iodinated contrast using angiographic PE protocol. Coronal and sagittal reformatted images were reconstructed from the axial data. MIP images were  created on an independent workstation and reviewed.   FINDINGS:   MEDIASTINUM AND LYMPH NODES:  The esophageal wall appears within normal limits.  No enlarged intrathoracic or axillary lymph nodes by imaging criteria. No pneumomediastinum.   VESSELS:  Normal caliber thoracic aorta without dissection. Mild aortic atherosclerosis.  No acute pulmonary embolism.   HEART: The right ventricle is slightly dilated compared to the left ventricle with artery-LV ratio of 1.25. The bilateral atria appear slightly enlarged.  Severe coronary artery calcifications. No significant pericardial effusion.   LUNG, AIRWAYS, AND PLEURA: Emphysema. Mild bibasilar atelectasis. No consolidation, pulmonary edema, pleural effusion or pneumothorax.   OSSEOUS STRUCTURES: No acute osseous abnormality.   CHEST WALL SOFT TISSUES: No discernible abnormality.   UPPER ABDOMEN/OTHER: Cholelithiasis. No discernible gallbladder wall thickening, pericholecystic fluid, or stranding. There is inflammatory wall thickening and stranding adjacent to the proximal descending colon, which was seen on 02/17/2025 CT abdomen/pelvis.       Slight enlargement of bilateral atria and mild right ventricular dilatation resulting in RV-LV ratio of 1.25. However, the main pulmonary artery is normal in diameter. This is not exclude pulmonary hypertension in the appropriate clinical context.   No acute pulmonary embolism.   Redemonstration of descending colitis.   Cholelithiasis.   Emphysema.   MACRO: None.   Signed by: Jimenez Hernandez 2/18/2025 9:30 PM Dictation workstation:   [id number]    Transthoracic Echo (TTE) Limited    Result Date: 2/18/2025            [facility] [id number] [id number]    Tel [phone] Fax [phone] TRANSTHORACIC ECHOCARDIOGRAM REPORT Patient Name:       SILVIO BARRAZA        Reading Physician:    [id number] Alexandru Kaba MD Study Date:         2/18/2025             Ordering Provider:    [id number] ERIC HUERTA MRN/PID:            [id number]             Fellow: Accession#:         [id number]         Nurse: Date of Birth/Age:  1950 / 74 years Sonographer:          Annabel Sykes Gender Assigned at  M                    Additional Staff: Birth: Height:             170.18 cm            Admit Date: Weight:             81.65 kg             Admission Status:     Inpatient -                                                                Priority                                                                discharge BSA / BMI:          1.93 m2 / 28.19      Department Location:  [facility] Echo Lab                     kg/m2 Blood Pressure: 155 /78 mmHg Study Type:    TRANSTHORACIC ECHO (TTE) LIMITED Diagnosis/ICD: Syncope and collapse-R55 Indication:    syncopal episode CPT Codes:     Echo Limited-20830; Doppler Limited-48235; Color Doppler-25949 Patient History: Valve Disorders:   Aortic Stenosis. Pertinent History: CAD, HTN, Hyperlipidemia and Syncope. Study Detail: The following Echo studies were performed: 2D, M-Mode, Doppler and               color flow. Technically challenging study due to patient lying in               supine position.  PHYSICIAN INTERPRETATION: Left Ventricle: The left ventricular systolic function is normal, with a visually estimated ejection fraction of 60-65%. There are no regional wall motion abnormalities. The left ventricular cavity size is normal. Spectral Doppler shows a Grade I (impaired relaxation pattern) of left ventricular diastolic filling with normal left atrial filling pressure. Left Atrium: The left atrial size is mildly dilated. Right Ventricle: The right ventricle is moderately enlarged. There is normal right ventricular global systolic function. Morales's sign is present, suggestive of pulmonary embolism. Right Atrium: The right atrial size is normal. Aortic Valve: The aortic valve is trileaflet. There is moderate aortic valve cusp  calcification. There is evidence of mild aortic valve stenosis. The aortic valve dimensionless index is 0.33. There is trace aortic valve regurgitation. The peak instantaneous gradient of the aortic valve is 32 mmHg. The mean gradient of the aortic valve is 15 mmHg. Mitral Valve: The mitral valve is mildly thickened. There is mild mitral valve regurgitation. Tricuspid Valve: The tricuspid valve is structurally normal. There is trace tricuspid regurgitation. The Doppler estimated RVSP is within normal limits at 33.7 mmHg. Pulmonic Valve: The pulmonic valve is structurally normal. There is no indication of pulmonic valve regurgitation. Pericardium: No pericardial effusion noted. Aorta: The aortic root is normal. Systemic Veins: The inferior vena cava appears normal in size.  CONCLUSIONS:  1. The left ventricular systolic function is normal, with a visually estimated ejection fraction of 60-65%.  2. Spectral Doppler shows a Grade I (impaired relaxation pattern) of left ventricular diastolic filling with normal left atrial filling pressure.  3. There is normal right ventricular global systolic function.  4. Morales's sign is present, suggestive of pulmonary embolism.  5. Moderately enlarged right ventricle.  6. Right ventricular systolic pressure is within normal limits.  7. Mild aortic valve stenosis.  8. There is moderate aortic valve cusp calcification. QUANTITATIVE DATA SUMMARY:  2D MEASUREMENTS:             Normal Ranges: LAs:             4.00 cm     (2.7-4.0cm) IVSd:            1.00 cm     (0.6-1.1cm) LVPWd:           1.70 cm     (0.6-1.1cm) LVIDd:           4.70 cm     (3.9-5.9cm) LVIDs:           3.20 cm LV Mass Index:   130.0 g/m2 LVEDV Index:     34.75 ml/m2 LV % FS          31.9 %  LEFT ATRIUM:                  Normal Ranges: LA Vol A4C:        37.4 ml    (22+/-6mL/m2) LA Vol A2C:        36.4 ml LA Vol BP:         39.8 ml LA Vol Index A4C:  19.3ml/m2 LA Vol Index A2C:  18.8 ml/m2 LA Vol Index BP:   20.6  ml/m2 LA Area A4C:       15.3 cm2 LA Area A2C:       14.0 cm2 LA Major Axis A4C: 5.3 cm LA Major Axis A2C: 4.6 cm LA Volume Index:   18.8 ml/m2 LA Vol A4C:        34.5 ml LA Vol A2C:        33.6 ml LA Vol Index BSA:  17.6 ml/m2  RIGHT ATRIUM:                 Normal Ranges: RA Vol A4C:        37.5 ml    (8.3-19.5ml) RA Vol Index A4C:  19.4 ml/m2 RA Area A4C:       14.1 cm2 RA Major Axis A4C: 4.5 cm  M-MODE MEASUREMENTS:         Normal Ranges: Ao Root:             3.30 cm (2.0-3.7cm) AoV Exc:             1.10 cm (1.5-2.5cm)  AORTA MEASUREMENTS:         Normal Ranges: AoV Exc:            1.10 cm (1.5-2.5cm)  LV SYSTOLIC FUNCTION:                      Normal Ranges: EF-A4C View:    61 % (>=55%) EF-A2C View:    61 % EF-Biplane:     60 % EF-Visual:      63 % LV EF Reported: 63 %  LV DIASTOLIC FUNCTION:            Normal Ranges: MV Peak E:             0.55 m/s   (0.7-1.2 m/s) MV Peak A:             0.80 m/s   (0.42-0.7 m/s) E/A Ratio:             0.69       (1.0-2.2) MV e'                  0.064 m/s  (>8.0) MV lateral e'          0.07 m/s MV medial e'           0.06 m/s E/e' Ratio:            8.69       (<8.0) PulmV Sys Raj:         38.60 cm/s PulmV Crane Raj:        41.40 cm/s PulmV S/D Raj:         0.90 PulmV A Revs Raj:      34.80 cm/s PulmV A Revs Dur:      92.00 msec  MITRAL VALVE:          Normal Ranges: MV DT:        255 msec (150-240msec)  MITRAL INSUFFICIENCY:             Normal Ranges: MR Vmax:              562.00 cm/s  AORTIC VALVE:                      Normal Ranges: AoV Vmax:                2.83 m/s  (<=1.7m/s) AoV Peak P.0 mmHg (<20mmHg) AoV Mean PG:             15.0 mmHg (1.7-11.5mmHg) LVOT Max Raj:            1.02 m/s  (<=1.1m/s) AoV VTI:                 49.40 cm  (18-25cm) LVOT VTI:                16.20 cm LVOT Diameter:           2.20 cm   (1.8-2.4cm) AoV Area, VTI:           1.25 cm2  (2.5-5.5cm2) AoV Area,Vmax:           1.37 cm2  (2.5-4.5cm2) AoV Dimensionless Index: 0.33  RIGHT  VENTRICLE: RV Basal 3.50 cm RV Mid   3.60 cm RV Major 6.2 cm TAPSE:   24.9 mm RV s'    0.23 m/s  TRICUSPID VALVE/RVSP:          Normal Ranges: Peak TR Velocity:     2.77 m/s Est. RA Pressure:     3 mmHg RV Syst Pressure:     34 mmHg  (< 30mmHg) IVC Diam:             1.80 cm  PULMONARY VEINS: PulmV A Revs Dur: 92.00 msec PulmV A Revs Raj: 34.80 cm/s PulmV Crane Raj:   41.40 cm/s PulmV S/D Raj:    0.90 PulmV Sys Raj:    38.60 cm/s  [id number] Alexandru Kaba MD Electronically signed on 2/18/2025 at 4:13:10 PM  ** Final **     ECG 12 lead    Result Date: 2/18/2025  Normal sinus rhythm T wave abnormality, consider inferior ischemia Abnormal ECG When compared with ECG of 18-MAY-2010 14:35, Nonspecific T wave abnormality now evident in Anterolateral leads    CT ankle right wo IV contrast    Result Date: 2/18/2025  Interpreted By:  Wayne Ceron, STUDY: CT ANKLE RIGHT WO IV CONTRAST; ;  2/17/2025 11:20 pm   INDICATION: Signs/Symptoms:Trauma.     COMPARISON: None.   ACCESSION NUMBER(S): [id number]   ORDERING CLINICIAN: ERIC HUERTA   TECHNIQUE: Noncontrast CT of the right ankle with multiplanar reformations.   FINDINGS: Acute mildly comminuted oblique fracture of the lateral malleolus at and above the level of the syndesmosis. There is lateral displacement of 1 cortical thickness and associated widening of the medial ankle mortise.   There are small ossific fragments posterior and medial to the medial aspect of the navicular bone (series 201, images 85-87), that may relate to accessory ossicles or displaced avulsion fracture fragments; please correlate clinically.   Small ossific fragment ventral to the distal tibial epiphysis, compatible with displaced avulsion fracture (series 204, images 51 and 52).   Tiny ossific fragment seen along the lateral process of the calcaneus, suspicious for small avulsion fracture (series 203, image 51).   Cortical indistinctness, lucency, depression and irregularity involving the medial and lateral  anterior aspects of the talar dome (series 204, images 41-45; 53-56), and also involving the medial aspect of the distal tibial epiphysis, suspicious for acute impaction fracture.   ORIF changes to the distal 1st through 3rd metatarsals associated with healed and remodeled fractures, without definite evidence of hardware complication.   Soft tissue swelling and edema. No subcutaneous air or foreign body.       Please see above.     MACRO: None   Signed by: Wayne Ceron 2/18/2025 12:11 AM Dictation workstation:   [id number]    CT head wo IV contrast    Result Date: 2/18/2025  Interpreted By:  Wayne Ceron, STUDY: CT HEAD WO IV CONTRAST; CT CERVICAL SPINE WO IV CONTRAST; ; 2/17/2025 11:20 pm   INDICATION: Signs/Symptoms:Trauma.     COMPARISON: None.   ACCESSION NUMBER(S): [id number]; [id number]   ORDERING CLINICIAN: ERIC HUERTA   TECHNIQUE: Noncontrast CT exams of the head and cervical spine with multiplanar reformations.   FINDINGS: BRAIN PARENCHYMA: Moderate volume loss.  There is patchy white matter hypoattenuation, nonspecific, but most often attributable to chronic microvascular ischemic change. Gray-white matter interfaces are preserved. No mass, mass effect or midline shift.   HEMORRHAGE: No acute intracranial hemorrhage. VENTRICLES and EXTRA-AXIAL SPACES: Normal size. EXTRACRANIAL SOFT TISSUES: Within normal limits. PARANASAL SINUSES/MASTOIDS: The visualized paranasal sinuses and mastoid air cells are aerated. CALVARIUM: No depressed skull fracture. No destructive osseous lesion.   OTHER FINDINGS: None.   CERVICAL SPINE:   ALIGNMENT: Grade 1 degenerative anterolisthesis at C7-T1. Alignment is otherwise maintained. VERTEBRAE: No acute fracture. Vertebral stature is maintained. Endplate osteophytosis and irregularity and sclerosis associated with severe degenerative disc height loss at C6-C7 greater than C5-C6. Moderate hypertrophic facet osteoarthropathy. SPINAL CANAL: No critical spinal canal stenosis.  PREVERTEBRAL SOFT TISSUES: No prevertebral soft tissue swelling. LUNG APICES: Imaged portion of the lung apices are within normal limits.   OTHER FINDINGS: None.       No evidence of acute intracranial abnormality.   No acute cervical spine fracture or malalignment.     MACRO: None   Signed by: Wayne Ceron 2/18/2025 12:02 AM Dictation workstation:   [id number]    CT cervical spine wo IV contrast    Result Date: 2/18/2025  Interpreted By:  Wayne Ceron, STUDY: CT HEAD WO IV CONTRAST; CT CERVICAL SPINE WO IV CONTRAST; ; 2/17/2025 11:20 pm   INDICATION: Signs/Symptoms:Trauma.     COMPARISON: None.   ACCESSION NUMBER(S): [id number]; [id number]   ORDERING CLINICIAN: ERIC HUERTA   TECHNIQUE: Noncontrast CT exams of the head and cervical spine with multiplanar reformations.   FINDINGS: BRAIN PARENCHYMA: Moderate volume loss.  There is patchy white matter hypoattenuation, nonspecific, but most often attributable to chronic microvascular ischemic change. Gray-white matter interfaces are preserved. No mass, mass effect or midline shift.   HEMORRHAGE: No acute intracranial hemorrhage. VENTRICLES and EXTRA-AXIAL SPACES: Normal size. EXTRACRANIAL SOFT TISSUES: Within normal limits. PARANASAL SINUSES/MASTOIDS: The visualized paranasal sinuses and mastoid air cells are aerated. CALVARIUM: No depressed skull fracture. No destructive osseous lesion.   OTHER FINDINGS: None.   CERVICAL SPINE:   ALIGNMENT: Grade 1 degenerative anterolisthesis at C7-T1. Alignment is otherwise maintained. VERTEBRAE: No acute fracture. Vertebral stature is maintained. Endplate osteophytosis and irregularity and sclerosis associated with severe degenerative disc height loss at C6-C7 greater than C5-C6. Moderate hypertrophic facet osteoarthropathy. SPINAL CANAL: No critical spinal canal stenosis. PREVERTEBRAL SOFT TISSUES: No prevertebral soft tissue swelling. LUNG APICES: Imaged portion of the lung apices are within normal limits.   OTHER FINDINGS:  None.       No evidence of acute intracranial abnormality.   No acute cervical spine fracture or malalignment.     MACRO: None   Signed by: Wayne Ceron 2/18/2025 12:02 AM Dictation workstation:   [id number]    CT abdomen pelvis w IV contrast    Result Date: 2/17/2025  Interpreted By:  Wayne Ceron, STUDY: CT ABDOMEN PELVIS W IV CONTRAST; ;  2/17/2025 11:19 pm   INDICATION: Signs/Symptoms:LLQ tenderness, significant watery stools recurrent since 5:30 PM, assess for diverticulitis.     COMPARISON: None.   ACCESSION NUMBER(S): [id number]   ORDERING CLINICIAN: ERIC HUERTA   TECHNIQUE: Axial CT images of the abdomen and pelvis with coronal and sagittal reconstructed images performed after intravenous administration of 75 cc Omnipaque 350.   FINDINGS: LOWER CHEST: No acute abnormality of the lung bases. Mild platelike atelectasis or scarring in both lung bases. Normal heart size. Submucosal edema in the distal esophagus, compatible with esophagitis; correlate clinically with GERD. BONES: No acute osseous abnormality. Grade 1 degenerative anterolisthesis at L1-L2 through L3-L4. Moderate degenerative changes of the lumbar spine. Mild-to-moderate bilateral hip osteoarthropathy. ABDOMINAL WALL: Small fat containing umbilical hernia.   ABDOMEN:   LIVER: Mildly enlarged. No focal lesion. BILE DUCTS: Normal caliber. GALLBLADDER: Cholelithiasis. No wall thickening or pericholecystic fluid or inflammation. PANCREAS: Within normal limits. SPLEEN: Within normal limits. ADRENALS: Within normal limits. KIDNEYS and URETERS: Within normal limits.     VESSELS: Mild atherosclerosis. No aneurysmal dilation. RETROPERITONEUM: No pathologically enlarged retroperitoneal lymph nodes.   PELVIS:   REPRODUCTIVE ORGANS: Prostate is not enlarged. Metallic densities are present in the prostate. BLADDER: Suboptimally evaluated due to incomplete distention, without definite abnormality.   BOWEL: Stomach appears within normal limits. No dilated or  thickened small bowel. There is diffuse haustral thickening throughout the colon, submucosal edema, compatible with colitis. Colonic diverticulosis without convincing evidence of acute diverticulitis. Normal appendix. PERITONEUM: No ascites or free air, no fluid collection.       There is diffuse haustral thickening throughout the colon, submucosal edema, compatible with uncomplicated acute colitis. Colonic diverticulosis without convincing evidence of acute diverticulitis.   No other evidence of acute pathology.   Cholelithiasis without evidence of acute cholecystitis.   Mild atherosclerosis.   Additional findings as discussed above.     MACRO: None   Signed by: Wayne Ceron 2/17/2025 11:55 PM Dictation workstation:   [id number]    XR chest 1 view    Result Date: 2/17/2025  Interpreted By:  Elmo Youssef, STUDY: XR CHEST 1 VIEW;  2/17/2025 9:39 pm   INDICATION: Signs/Symptoms:Syncope.     COMPARISON: None.   ACCESSION NUMBER(S): [id number]   ORDERING CLINICIAN: ERIC HUERTA   FINDINGS:         CARDIOMEDIASTINAL SILHOUETTE: Cardiomediastinal silhouette is normal in size and configuration.   LUNGS: Lungs are clear.   ABDOMEN: No remarkable upper abdominal findings.   BONES: No acute osseous changes.       1.  No evidence of acute cardiopulmonary process.       MACRO: None   Signed by: Elmo Youssef 2/17/2025 9:41 PM Dictation workstation:   [id number]    XR foot right 3+ views    Result Date: 2/17/2025  Interpreted By:  Elmo Youssef, STUDY: XR FOOT RIGHT 3+ VIEWS; ;  2/17/2025 9:21 pm   INDICATION: Signs/Symptoms:Trauma.     COMPARISON: None.   ACCESSION NUMBER(S): [id number]   ORDERING CLINICIAN: ERIC HUERTA   FINDINGS:   Right ankle and right foot, three views of each   There is a mildly displaced oblique fracture through the distal fibula. There is widening of the medial clear space. There is severe bimalleolar soft tissue edema. No definite fracture seen in the tibia   Postsurgical changes in the  foot to include osteotomy with fixation in the 1st, 2nd and 3rd metatarsal heads and the base of the 1st proximal phalanx. Moderate degenerative changes in the 1st, 2nd 3rd MTP joints.       Mildly displaced fracture through the distal fibula. Widening of the medial clear space compatible with underlying ligamentous injury. Bimalleolar soft tissue edema. Postsurgical changes of the forefoot without acute abnormality   MACRO: None   Signed by: Elmo Youssef 2/17/2025 9:26 PM Dictation workstation:   [id number]    XR ankle right 3+ views    Result Date: 2/17/2025  Interpreted By:  lEmo Youssef, STUDY: XR ANKLE RIGHT 3+ VIEWS; ;  2/17/2025 9:21 pm   INDICATION: Signs/Symptoms:Trauma.     COMPARISON: None.   ACCESSION NUMBER(S): [id number]   ORDERING CLINICIAN: ERIC HUERTA   FINDINGS: Right ankle and right foot, three views of each   There is a mildly displaced oblique fracture through the distal fibula. There is widening of the medial clear space. There is severe bimalleolar soft tissue edema. No definite fracture seen in the tibia   Postsurgical changes in the foot to include osteotomy with fixation in the 1st, 2nd and 3rd metatarsal heads and the base of the 1st proximal phalanx. Moderate degenerative changes in the 1st, 2nd 3rd MTP joints.         Mildly displaced fracture through the distal fibula. Widening of the medial clear space compatible with underlying ligamentous injury. Bimalleolar soft tissue edema. Postsurgical changes of the forefoot without acute abnormality     MACRO: None   Signed by: Elmo Youssef 2/17/2025 9:26 PM Dictation workstation:   [id number]            Assessment/Plan   Assessment & Plan  Closed fracture of right ankle, initial encounter    Syncope    POD #1 s/p right ankle ORIF  PT/OT, NWB right  LE  Reviewed am labs  Elevate distal right LE  Multimodal pain regimen  Keep distal right LE splint dressing clean and dry  Dispo: to be determined after PT eval  Follow up with   [facility] in 10 days     Discussed care plan with patient and they verbalized understanding  Thank you for allowing us to participate in this patient's care  Okay for discharge from orthopedic standpoint when medically stable, please reach out for any further questions or concerns       I spent 25 minutes in the professional and overall care of this patient.      Meredith Grigsby PA-C

## 2025-02-20 NOTE — CARE PLAN
The patient's goals for the shift include comfort, sleep  The clinical goals for the shift include See POC    Over the shift, the patient did  make progress toward the following goals.   Pt stated did get at least 4 hrs sleep.  Pt stated has some sensation to lt foot however still has nerve block working, unablel to wiggle toes,  foot warm to touch,good circ chek.  Safety measures maintained.

## 2025-02-20 NOTE — PROGRESS NOTES
Julio Miller is a 74 y.o. male on day 2 of admission presenting with Closed fracture of right ankle, initial encounter.      Subjective   Patient seen and examined at bedside. He denies any bowel movements.        Objective     Last Recorded Vitals  /78 (BP Location: Right arm)   Pulse 74   Temp 37 °C (98.6 °F) (Temporal)   Resp 18   Wt 95 kg (209 lb 7 oz)   SpO2 98%   Intake/Output last 3 Shifts:    Intake/Output Summary (Last 24 hours) at 2/20/2025 1153  Last data filed at 2/20/2025 0530  Gross per 24 hour   Intake 1400 ml   Output 1025 ml   Net 375 ml       Admission Weight  Weight: 81.6 kg (180 lb) (02/17/25 1957)    Daily Weight  02/18/25 : 95 kg (209 lb 7 oz)    Image Results  FL fluoro images no charge  These images are not reportable by radiology and will not be interpreted   by  Radiologists.      Physical Exam  General: Patient does not appear to be in any acute distress, alert, awake  Head: Normocephalic, Atraumatic   Cardiovascular: RRR, S1/S2, no murmurs, rubs, or gallops, radial pulses +2  Pulmonary: CTAB, no respiratory distress.  Abdomen: +BS, soft, non-tender, nondistended, no guarding or rebound, no masses noted  Neuro: A&O x3, CN intact, no focal deficits, strength and sensation intact bilaterally  MSK: No joint swelling, normal movements of all extremities. Passive ROM intact  Extremities: No edema appreciated in lower extremities bilaterally, no cyanosis  Skin- Warm. Dry. No lesions, contusions, or erythema.  Psychiatric: Judgment intact. Appropriate mood and behavior    Relevant Results               Assessment/Plan                  Assessment & Plan  Closed fracture of right ankle, initial encounter    Syncope    #Bloody BM c/f LGIB - improved?  #Colitis, possible viral gastroenteritis   -Documented photos showed BRBPR; Hgb 12.5 today, baseline 13-14   -CT as above showing colitis, but workup for infectious colitis negative thus far   -Prior colonoscopy (2022) showed internal  hemorrhoids  Plan:   -No colonoscopy this AM as patient went to OR for ankle surgery last night  -No more BM overnight and can have outpatient colonoscopy on discharge; however, should assess bleeding risk on discharge if patient will require VTE ppx following ankle surgery and if this will deem inpatient colonoscopy necessary        Case seen and discussed with Dr. Sam Gracia DO  PGY-3 Internal Medicine  This note has been transcribed using Dragon voice recognition system and there is a possibility of unintentional typing misprints.  Any information found to be copied from previous providers is done in the best interest of the patient to provide accurate, quality, and continuity of care.

## 2025-02-20 NOTE — CARE PLAN
Problem: Discharge Planning  Goal: Discharge to home or other facility with appropriate resources  Outcome: Progressing     Problem: Chronic Conditions and Co-morbidities  Goal: Patient's chronic conditions and co-morbidity symptoms are monitored and maintained or improved  Outcome: Progressing     Problem: Nutrition  Goal: Nutrient intake appropriate for maintaining nutritional needs  Outcome: Progressing   The patient's goals for the shift include To not fall all shift    The clinical goals for the shift include See POC

## 2025-02-20 NOTE — CARE PLAN
Problem: Discharge Planning  Goal: Discharge to home or other facility with appropriate resources  Outcome: Progressing   The patient's goals for the shift include To not fall all shift    The clinical goals for the shift include See POC

## 2025-02-20 NOTE — PROGRESS NOTES
Julio Miller is a 74 y.o. male on day 2 of admission presenting with Closed fracture of right ankle, initial encounter.      Subjective   Patient was seen and examined today in the morning at bedside.  No acute events overnight..  He was evaluated in the morning, endorsing that he has no more abdominal pain.  He denies any further bloody bowel movement and no changes to his urinary habits.  Postoperative course has been stable.    Objective     Last Recorded Vitals  BP (!) 192/87 (BP Location: Right arm, Patient Position: Sitting)   Pulse 80   Temp 36 °C (96.8 °F) (Temporal)   Resp 17   Wt 95 kg (209 lb 7 oz)   SpO2 98%   Intake/Output last 3 Shifts:    Intake/Output Summary (Last 24 hours) at 2/20/2025 1535  Last data filed at 2/20/2025 1500  Gross per 24 hour   Intake 1633.33 ml   Output 1175 ml   Net 458.33 ml       Admission Weight  Weight: 81.6 kg (180 lb) (02/17/25 1957)    Daily Weight  02/18/25 : 95 kg (209 lb 7 oz)      Physical Exam:  General: Not in acute distress, alert  HEENT: PERRLA, head intact and normocephalic  Neck: Normal to inspection  Lungs: Clear to auscultation, work of breathing within normal limit  Cardiac: Regular rate and rhythm  Abdomen: Soft mild tenderness in the left lower quadrant, positive bowel sounds  : Exam deferred  Skin: Intact  Hematology: No petechia or excessive ecchymosis  Musculoskeletal: Left ankle splint status post ORIF  Neurological: Alert awake oriented, no focal deficit, cranial nerves grossly intact  Psych: No suicidal ideation or homicidal ideation    Relevant Results  Scheduled medications  amLODIPine, 2.5 mg, oral, Daily  aspirin, 81 mg, oral, BID  [Held by provider] heparin (porcine), 5,000 Units, subcutaneous, q8h JANAE  pantoprazole, 40 mg, intravenous, BID  rosuvastatin, 5 mg, oral, Every other day  tamsulosin, 0.4 mg, oral, Nightly      Continuous medications  lactated Ringer's, 100 mL/hr, Last Rate: 100 mL/hr (02/20/25 0840)      PRN medications  PRN  medications: acetaminophen **OR** acetaminophen **OR** acetaminophen, oxyCODONE     Results for orders placed or performed during the hospital encounter of 02/17/25 (from the past 24 hours)   CBC   Result Value Ref Range    WBC 7.2 4.4 - 11.3 x10*3/uL    nRBC 0.0 0.0 - 0.0 /100 WBCs    RBC 4.22 (L) 4.50 - 5.90 x10*6/uL    Hemoglobin 13.0 (L) 13.5 - 17.5 g/dL    Hematocrit 42.3 41.0 - 52.0 %     80 - 100 fL    MCH 30.8 26.0 - 34.0 pg    MCHC 30.7 (L) 32.0 - 36.0 g/dL    RDW 12.0 11.5 - 14.5 %    Platelets 111 (L) 150 - 450 x10*3/uL   Magnesium   Result Value Ref Range    Magnesium 2.23 1.60 - 2.40 mg/dL   Renal Function Panel   Result Value Ref Range    Glucose 118 (H) 74 - 99 mg/dL    Sodium 134 (L) 136 - 145 mmol/L    Potassium 4.4 3.5 - 5.3 mmol/L    Chloride 101 98 - 107 mmol/L    Bicarbonate 19 (L) 21 - 32 mmol/L    Anion Gap 18 10 - 20 mmol/L    Urea Nitrogen 14 6 - 23 mg/dL    Creatinine 1.31 (H) 0.50 - 1.30 mg/dL    eGFR 57 (L) >60 mL/min/1.73m*2    Calcium 8.6 8.6 - 10.3 mg/dL    Phosphorus 4.2 2.5 - 4.9 mg/dL    Albumin 3.9 3.4 - 5.0 g/dL   Lactate   Result Value Ref Range    Lactate 0.9 0.4 - 2.0 mmol/L             FL fluoro images no charge    Result Date: 2/19/2025  These images are not reportable by radiology and will not be interpreted by  Radiologists.    CT angio chest for pulmonary embolism    Result Date: 2/18/2025  Interpreted By:  Jimenez Hernandez, STUDY: CT ANGIO CHEST FOR PULMONARY EMBOLISM;  2/18/2025 7:51 pm   INDICATION: Signs/Symptoms:Moderately enlarged right ventricle on echo there is suggestive for PE.     COMPARISON: None.   ACCESSION NUMBER(S): [id number]   ORDERING CLINICIAN: SHAYAN LIGHTFORD   TECHNIQUE: Contiguous axial images of the chest were obtained after the intravenous administration of iodinated contrast using angiographic PE protocol. Coronal and sagittal reformatted images were reconstructed from the axial data. MIP images were created on an independent  workstation and reviewed.   FINDINGS:   MEDIASTINUM AND LYMPH NODES:  The esophageal wall appears within normal limits.  No enlarged intrathoracic or axillary lymph nodes by imaging criteria. No pneumomediastinum.   VESSELS:  Normal caliber thoracic aorta without dissection. Mild aortic atherosclerosis.  No acute pulmonary embolism.   HEART: The right ventricle is slightly dilated compared to the left ventricle with artery-LV ratio of 1.25. The bilateral atria appear slightly enlarged.  Severe coronary artery calcifications. No significant pericardial effusion.   LUNG, AIRWAYS, AND PLEURA: Emphysema. Mild bibasilar atelectasis. No consolidation, pulmonary edema, pleural effusion or pneumothorax.   OSSEOUS STRUCTURES: No acute osseous abnormality.   CHEST WALL SOFT TISSUES: No discernible abnormality.   UPPER ABDOMEN/OTHER: Cholelithiasis. No discernible gallbladder wall thickening, pericholecystic fluid, or stranding. There is inflammatory wall thickening and stranding adjacent to the proximal descending colon, which was seen on 02/17/2025 CT abdomen/pelvis.       Slight enlargement of bilateral atria and mild right ventricular dilatation resulting in RV-LV ratio of 1.25. However, the main pulmonary artery is normal in diameter. This is not exclude pulmonary hypertension in the appropriate clinical context.   No acute pulmonary embolism.   Redemonstration of descending colitis.   Cholelithiasis.   Emphysema.   MACRO: None.   Signed by: Jimenez Hernandez 2/18/2025 9:30 PM Dictation workstation:   [id number]    Transthoracic Echo (TTE) Limited    Result Date: 2/18/2025            [facility] [place] [address] [address]    Tel [phone] Fax [phone] TRANSTHORACIC ECHOCARDIOGRAM REPORT Patient Name:       SILVIO BARRAZA        Reading Physician:    [id number] Alexandru Kaba MD Study Date:         2/18/2025            Ordering  Provider:    [id number] ERIC HUERTA MRN/PID:            [id number]             Fellow: Accession#:         [id number]         Nurse: Date of Birth/Age:  1950 / 74 years Sonographer:          Annabel Sykes Gender Assigned at  M                    Additional Staff: Birth: Height:             170.18 cm            Admit Date: Weight:             81.65 kg             Admission Status:     Inpatient -                                                                Priority                                                                discharge BSA / BMI:          1.93 m2 / 28.19      Department Location:  [facility] Echo Lab                     kg/m2 Blood Pressure: 155 /78 mmHg Study Type:    TRANSTHORACIC ECHO (TTE) LIMITED Diagnosis/ICD: Syncope and collapse-R55 Indication:    syncopal episode CPT Codes:     Echo Limited-83788; Doppler Limited-06168; Color Doppler-69534 Patient History: Valve Disorders:   Aortic Stenosis. Pertinent History: CAD, HTN, Hyperlipidemia and Syncope. Study Detail: The following Echo studies were performed: 2D, M-Mode, Doppler and               color flow. Technically challenging study due to patient lying in               supine position.  PHYSICIAN INTERPRETATION: Left Ventricle: The left ventricular systolic function is normal, with a visually estimated ejection fraction of 60-65%. There are no regional wall motion abnormalities. The left ventricular cavity size is normal. Spectral Doppler shows a Grade I (impaired relaxation pattern) of left ventricular diastolic filling with normal left atrial filling pressure. Left Atrium: The left atrial size is mildly dilated. Right Ventricle: The right ventricle is moderately enlarged. There is normal right ventricular global systolic function. Morales's sign is present, suggestive of pulmonary embolism. Right Atrium: The right atrial size is normal. Aortic Valve: The aortic valve is trileaflet. There is moderate aortic valve cusp calcification. There is  evidence of mild aortic valve stenosis. The aortic valve dimensionless index is 0.33. There is trace aortic valve regurgitation. The peak instantaneous gradient of the aortic valve is 32 mmHg. The mean gradient of the aortic valve is 15 mmHg. Mitral Valve: The mitral valve is mildly thickened. There is mild mitral valve regurgitation. Tricuspid Valve: The tricuspid valve is structurally normal. There is trace tricuspid regurgitation. The Doppler estimated RVSP is within normal limits at 33.7 mmHg. Pulmonic Valve: The pulmonic valve is structurally normal. There is no indication of pulmonic valve regurgitation. Pericardium: No pericardial effusion noted. Aorta: The aortic root is normal. Systemic Veins: The inferior vena cava appears normal in size.  CONCLUSIONS:  1. The left ventricular systolic function is normal, with a visually estimated ejection fraction of 60-65%.  2. Spectral Doppler shows a Grade I (impaired relaxation pattern) of left ventricular diastolic filling with normal left atrial filling pressure.  3. There is normal right ventricular global systolic function.  4. Morales's sign is present, suggestive of pulmonary embolism.  5. Moderately enlarged right ventricle.  6. Right ventricular systolic pressure is within normal limits.  7. Mild aortic valve stenosis.  8. There is moderate aortic valve cusp calcification. QUANTITATIVE DATA SUMMARY:  2D MEASUREMENTS:             Normal Ranges: LAs:             4.00 cm     (2.7-4.0cm) IVSd:            1.00 cm     (0.6-1.1cm) LVPWd:           1.70 cm     (0.6-1.1cm) LVIDd:           4.70 cm     (3.9-5.9cm) LVIDs:           3.20 cm LV Mass Index:   130.0 g/m2 LVEDV Index:     34.75 ml/m2 LV % FS          31.9 %  LEFT ATRIUM:                  Normal Ranges: LA Vol A4C:        37.4 ml    (22+/-6mL/m2) LA Vol A2C:        36.4 ml LA Vol BP:         39.8 ml LA Vol Index A4C:  19.3ml/m2 LA Vol Index A2C:  18.8 ml/m2 LA Vol Index BP:   20.6 ml/m2 LA Area A4C:        15.3 cm2 LA Area A2C:       14.0 cm2 LA Major Axis A4C: 5.3 cm LA Major Axis A2C: 4.6 cm LA Volume Index:   18.8 ml/m2 LA Vol A4C:        34.5 ml LA Vol A2C:        33.6 ml LA Vol Index BSA:  17.6 ml/m2  RIGHT ATRIUM:                 Normal Ranges: RA Vol A4C:        37.5 ml    (8.3-19.5ml) RA Vol Index A4C:  19.4 ml/m2 RA Area A4C:       14.1 cm2 RA Major Axis A4C: 4.5 cm  M-MODE MEASUREMENTS:         Normal Ranges: Ao Root:             3.30 cm (2.0-3.7cm) AoV Exc:             1.10 cm (1.5-2.5cm)  AORTA MEASUREMENTS:         Normal Ranges: AoV Exc:            1.10 cm (1.5-2.5cm)  LV SYSTOLIC FUNCTION:                      Normal Ranges: EF-A4C View:    61 % (>=55%) EF-A2C View:    61 % EF-Biplane:     60 % EF-Visual:      63 % LV EF Reported: 63 %  LV DIASTOLIC FUNCTION:            Normal Ranges: MV Peak E:             0.55 m/s   (0.7-1.2 m/s) MV Peak A:             0.80 m/s   (0.42-0.7 m/s) E/A Ratio:             0.69       (1.0-2.2) MV e'                  0.064 m/s  (>8.0) MV lateral e'          0.07 m/s MV medial e'           0.06 m/s E/e' Ratio:            8.69       (<8.0) PulmV Sys Raj:         38.60 cm/s PulmV Crane Raj:        41.40 cm/s PulmV S/D Raj:         0.90 PulmV A Revs Raj:      34.80 cm/s PulmV A Revs Dur:      92.00 msec  MITRAL VALVE:          Normal Ranges: MV DT:        255 msec (150-240msec)  MITRAL INSUFFICIENCY:             Normal Ranges: MR Vmax:              562.00 cm/s  AORTIC VALVE:                      Normal Ranges: AoV Vmax:                2.83 m/s  (<=1.7m/s) AoV Peak P.0 mmHg (<20mmHg) AoV Mean PG:             15.0 mmHg (1.7-11.5mmHg) LVOT Max Raj:            1.02 m/s  (<=1.1m/s) AoV VTI:                 49.40 cm  (18-25cm) LVOT VTI:                16.20 cm LVOT Diameter:           2.20 cm   (1.8-2.4cm) AoV Area, VTI:           1.25 cm2  (2.5-5.5cm2) AoV Area,Vmax:           1.37 cm2  (2.5-4.5cm2) AoV Dimensionless Index: 0.33  RIGHT VENTRICLE: RV Basal 3.50 cm RV  Mid   3.60 cm RV Major 6.2 cm TAPSE:   24.9 mm RV s'    0.23 m/s  TRICUSPID VALVE/RVSP:          Normal Ranges: Peak TR Velocity:     2.77 m/s Est. RA Pressure:     3 mmHg RV Syst Pressure:     34 mmHg  (< 30mmHg) IVC Diam:             1.80 cm  PULMONARY VEINS: PulmV A Revs Dur: 92.00 msec PulmV A Revs Raj: 34.80 cm/s PulmV Crane Raj:   41.40 cm/s PulmV S/D Raj:    0.90 PulmV Sys Raj:    38.60 cm/s  [id number] Alexandru Kaba MD Electronically signed on 2/18/2025 at 4:13:10 PM  ** Final **     ECG 12 lead    Result Date: 2/18/2025  Normal sinus rhythm T wave abnormality, consider inferior ischemia Abnormal ECG When compared with ECG of 18-MAY-2010 14:35, Nonspecific T wave abnormality now evident in Anterolateral leads    CT ankle right wo IV contrast    Result Date: 2/18/2025  Interpreted By:  Wayne Ceron, STUDY: CT ANKLE RIGHT WO IV CONTRAST; ;  2/17/2025 11:20 pm   INDICATION: Signs/Symptoms:Trauma.     COMPARISON: None.   ACCESSION NUMBER(S): [id number]   ORDERING CLINICIAN: ERIC HUERTA   TECHNIQUE: Noncontrast CT of the right ankle with multiplanar reformations.   FINDINGS: Acute mildly comminuted oblique fracture of the lateral malleolus at and above the level of the syndesmosis. There is lateral displacement of 1 cortical thickness and associated widening of the medial ankle mortise.   There are small ossific fragments posterior and medial to the medial aspect of the navicular bone (series 201, images 85-87), that may relate to accessory ossicles or displaced avulsion fracture fragments; please correlate clinically.   Small ossific fragment ventral to the distal tibial epiphysis, compatible with displaced avulsion fracture (series 204, images 51 and 52).   Tiny ossific fragment seen along the lateral process of the calcaneus, suspicious for small avulsion fracture (series 203, image 51).   Cortical indistinctness, lucency, depression and irregularity involving the medial and lateral anterior aspects of the talar  dome (series 204, images 41-45; 53-56), and also involving the medial aspect of the distal tibial epiphysis, suspicious for acute impaction fracture.   ORIF changes to the distal 1st through 3rd metatarsals associated with healed and remodeled fractures, without definite evidence of hardware complication.   Soft tissue swelling and edema. No subcutaneous air or foreign body.       Please see above.     MACRO: None   Signed by: Wayne Ceron 2/18/2025 12:11 AM Dictation workstation:   [id number]    CT head wo IV contrast    Result Date: 2/18/2025  Interpreted By:  Wayne Ceron, STUDY: CT HEAD WO IV CONTRAST; CT CERVICAL SPINE WO IV CONTRAST; ; 2/17/2025 11:20 pm   INDICATION: Signs/Symptoms:Trauma.     COMPARISON: None.   ACCESSION NUMBER(S): [id number]; [id number]   ORDERING CLINICIAN: ERIC HUERTA   TECHNIQUE: Noncontrast CT exams of the head and cervical spine with multiplanar reformations.   FINDINGS: BRAIN PARENCHYMA: Moderate volume loss.  There is patchy white matter hypoattenuation, nonspecific, but most often attributable to chronic microvascular ischemic change. Gray-white matter interfaces are preserved. No mass, mass effect or midline shift.   HEMORRHAGE: No acute intracranial hemorrhage. VENTRICLES and EXTRA-AXIAL SPACES: Normal size. EXTRACRANIAL SOFT TISSUES: Within normal limits. PARANASAL SINUSES/MASTOIDS: The visualized paranasal sinuses and mastoid air cells are aerated. CALVARIUM: No depressed skull fracture. No destructive osseous lesion.   OTHER FINDINGS: None.   CERVICAL SPINE:   ALIGNMENT: Grade 1 degenerative anterolisthesis at C7-T1. Alignment is otherwise maintained. VERTEBRAE: No acute fracture. Vertebral stature is maintained. Endplate osteophytosis and irregularity and sclerosis associated with severe degenerative disc height loss at C6-C7 greater than C5-C6. Moderate hypertrophic facet osteoarthropathy. SPINAL CANAL: No critical spinal canal stenosis. PREVERTEBRAL SOFT TISSUES: No  prevertebral soft tissue swelling. LUNG APICES: Imaged portion of the lung apices are within normal limits.   OTHER FINDINGS: None.       No evidence of acute intracranial abnormality.   No acute cervical spine fracture or malalignment.     MACRO: None   Signed by: Wayne Ceron 2/18/2025 12:02 AM Dictation workstation:   [id number]    CT cervical spine wo IV contrast    Result Date: 2/18/2025  Interpreted By:  Wayne Ceron, STUDY: CT HEAD WO IV CONTRAST; CT CERVICAL SPINE WO IV CONTRAST; ; 2/17/2025 11:20 pm   INDICATION: Signs/Symptoms:Trauma.     COMPARISON: None.   ACCESSION NUMBER(S): [id number]; [id number]   ORDERING CLINICIAN: ERIC HUERTA   TECHNIQUE: Noncontrast CT exams of the head and cervical spine with multiplanar reformations.   FINDINGS: BRAIN PARENCHYMA: Moderate volume loss.  There is patchy white matter hypoattenuation, nonspecific, but most often attributable to chronic microvascular ischemic change. Gray-white matter interfaces are preserved. No mass, mass effect or midline shift.   HEMORRHAGE: No acute intracranial hemorrhage. VENTRICLES and EXTRA-AXIAL SPACES: Normal size. EXTRACRANIAL SOFT TISSUES: Within normal limits. PARANASAL SINUSES/MASTOIDS: The visualized paranasal sinuses and mastoid air cells are aerated. CALVARIUM: No depressed skull fracture. No destructive osseous lesion.   OTHER FINDINGS: None.   CERVICAL SPINE:   ALIGNMENT: Grade 1 degenerative anterolisthesis at C7-T1. Alignment is otherwise maintained. VERTEBRAE: No acute fracture. Vertebral stature is maintained. Endplate osteophytosis and irregularity and sclerosis associated with severe degenerative disc height loss at C6-C7 greater than C5-C6. Moderate hypertrophic facet osteoarthropathy. SPINAL CANAL: No critical spinal canal stenosis. PREVERTEBRAL SOFT TISSUES: No prevertebral soft tissue swelling. LUNG APICES: Imaged portion of the lung apices are within normal limits.   OTHER FINDINGS: None.       No evidence of  acute intracranial abnormality.   No acute cervical spine fracture or malalignment.     MACRO: None   Signed by: Wayne Ceron 2/18/2025 12:02 AM Dictation workstation:   [id number]    CT abdomen pelvis w IV contrast    Result Date: 2/17/2025  Interpreted By:  Wayne Ceron, STUDY: CT ABDOMEN PELVIS W IV CONTRAST; ;  2/17/2025 11:19 pm   INDICATION: Signs/Symptoms:LLQ tenderness, significant watery stools recurrent since 5:30 PM, assess for diverticulitis.     COMPARISON: None.   ACCESSION NUMBER(S): [id number]   ORDERING CLINICIAN: ERIC HUERTA   TECHNIQUE: Axial CT images of the abdomen and pelvis with coronal and sagittal reconstructed images performed after intravenous administration of 75 cc Omnipaque 350.   FINDINGS: LOWER CHEST: No acute abnormality of the lung bases. Mild platelike atelectasis or scarring in both lung bases. Normal heart size. Submucosal edema in the distal esophagus, compatible with esophagitis; correlate clinically with GERD. BONES: No acute osseous abnormality. Grade 1 degenerative anterolisthesis at L1-L2 through L3-L4. Moderate degenerative changes of the lumbar spine. Mild-to-moderate bilateral hip osteoarthropathy. ABDOMINAL WALL: Small fat containing umbilical hernia.   ABDOMEN:   LIVER: Mildly enlarged. No focal lesion. BILE DUCTS: Normal caliber. GALLBLADDER: Cholelithiasis. No wall thickening or pericholecystic fluid or inflammation. PANCREAS: Within normal limits. SPLEEN: Within normal limits. ADRENALS: Within normal limits. KIDNEYS and URETERS: Within normal limits.     VESSELS: Mild atherosclerosis. No aneurysmal dilation. RETROPERITONEUM: No pathologically enlarged retroperitoneal lymph nodes.   PELVIS:   REPRODUCTIVE ORGANS: Prostate is not enlarged. Metallic densities are present in the prostate. BLADDER: Suboptimally evaluated due to incomplete distention, without definite abnormality.   BOWEL: Stomach appears within normal limits. No dilated or thickened small bowel. There  is diffuse haustral thickening throughout the colon, submucosal edema, compatible with colitis. Colonic diverticulosis without convincing evidence of acute diverticulitis. Normal appendix. PERITONEUM: No ascites or free air, no fluid collection.       There is diffuse haustral thickening throughout the colon, submucosal edema, compatible with uncomplicated acute colitis. Colonic diverticulosis without convincing evidence of acute diverticulitis.   No other evidence of acute pathology.   Cholelithiasis without evidence of acute cholecystitis.   Mild atherosclerosis.   Additional findings as discussed above.     MACRO: None   Signed by: Wayne Ceron 2/17/2025 11:55 PM Dictation workstation:   [id number]    XR chest 1 view    Result Date: 2/17/2025  Interpreted By:  Elmo Youssef, STUDY: XR CHEST 1 VIEW;  2/17/2025 9:39 pm   INDICATION: Signs/Symptoms:Syncope.     COMPARISON: None.   ACCESSION NUMBER(S): [id number]   ORDERING CLINICIAN: ERIC HUERTA   FINDINGS:         CARDIOMEDIASTINAL SILHOUETTE: Cardiomediastinal silhouette is normal in size and configuration.   LUNGS: Lungs are clear.   ABDOMEN: No remarkable upper abdominal findings.   BONES: No acute osseous changes.       1.  No evidence of acute cardiopulmonary process.       MACRO: None   Signed by: Elmo Youssef 2/17/2025 9:41 PM Dictation workstation:   [id number]    XR foot right 3+ views    Result Date: 2/17/2025  Interpreted By:  Elmo Youssef, STUDY: XR FOOT RIGHT 3+ VIEWS; ;  2/17/2025 9:21 pm   INDICATION: Signs/Symptoms:Trauma.     COMPARISON: None.   ACCESSION NUMBER(S): [id number]   ORDERING CLINICIAN: ERIC HUERTA   FINDINGS:   Right ankle and right foot, three views of each   There is a mildly displaced oblique fracture through the distal fibula. There is widening of the medial clear space. There is severe bimalleolar soft tissue edema. No definite fracture seen in the tibia   Postsurgical changes in the foot to include osteotomy with  fixation in the 1st, 2nd and 3rd metatarsal heads and the base of the 1st proximal phalanx. Moderate degenerative changes in the 1st, 2nd 3rd MTP joints.       Mildly displaced fracture through the distal fibula. Widening of the medial clear space compatible with underlying ligamentous injury. Bimalleolar soft tissue edema. Postsurgical changes of the forefoot without acute abnormality   MACRO: None   Signed by: Elmo Youssef 2/17/2025 9:26 PM Dictation workstation:   [id number]    XR ankle right 3+ views    Result Date: 2/17/2025  Interpreted By:  Elmo Youssef, STUDY: XR ANKLE RIGHT 3+ VIEWS; ;  2/17/2025 9:21 pm   INDICATION: Signs/Symptoms:Trauma.     COMPARISON: None.   ACCESSION NUMBER(S): [id number]   ORDERING CLINICIAN: ERIC HUERTA   FINDINGS: Right ankle and right foot, three views of each   There is a mildly displaced oblique fracture through the distal fibula. There is widening of the medial clear space. There is severe bimalleolar soft tissue edema. No definite fracture seen in the tibia   Postsurgical changes in the foot to include osteotomy with fixation in the 1st, 2nd and 3rd metatarsal heads and the base of the 1st proximal phalanx. Moderate degenerative changes in the 1st, 2nd 3rd MTP joints.         Mildly displaced fracture through the distal fibula. Widening of the medial clear space compatible with underlying ligamentous injury. Bimalleolar soft tissue edema. Postsurgical changes of the forefoot without acute abnormality     MACRO: None   Signed by: Elmo Youssef 2/17/2025 9:26 PM Dictation workstation:   [id number]       Julio Miller is a 74 y.o. male on day 2 of admission presenting with Closed fracture of right ankle, initial encounter.  Assessment/Plan        Assessment & Plan  Closed fracture of right ankle, initial encounter    Syncope      74-year-old male with a history of HTN, HLD, prostate cancer in remission, OAB, CKD3, GERD, and depression presenting with syncope,  diarrhea, and a right ankle fracture. Symptoms likely secondary to acute diarrheal illness, possibly infectious given recent sick contact, leading to volume depletion and a syncopal episode. Workup revealed mild ASHANTI (Cr 1.77, BUN 25), mild leukocytosis (WBC 11.6), and imaging findings of colitis. No ongoing diarrhea or abdominal pain in the hospital. CT head and C-spine were unremarkable. EKG showed new T wave inversions compared to 2010; serial troponins were negative. Ankle imaging confirmed a bimalleolar fracture, currently splinted with intact neurovascular status. Plan includes IV fluids for ASHANTI, stool studies if diarrhea recurs, pain control with Tylenol and opioids as needed, orthopedic consultation for fracture management, telemetry monitoring for syncope evaluation, and cardiology input given EKG changes.    #Syncopal episode  #Viral gastroenteritis  #Pulmonary hypertension  #Hx mild aortic stenosis  -Suspect single episode is likely due to dehydration with suspected viral GI illness, low concern for cardiogenic cause, given benign exam, EKG did show some inverted T waves in lateral leads unknown chronicity, denies any chest pain. Last echo 3/24/2022 showing EF 55 to 60%, some diastolic LV dysfunction, mild aortic stenosis, mild to moderate aortic valve regurg, 1+ TR/MR. Repeated echo showed EF of 60% with right ventricular dilatation which was concerning for PE.  Subsequently patient underwent CT scan that showed no PE but showed significant enlargement of bilateral atria with mild right ventricular dilatation with normal main pulmonary artery.    Plan:  -Due to ankle fracture unable to obtain full orthostatic vitals.  -Stool pathogen negative, C. difficile negative  -Continue telemetry  -Monitor electrolyte and replace as appropriate.  Keep K above 4, mag above 2.  -Patient will need outpatient follow-up with cardiology to monitor for pulmonary pretension    #GI bleed likely upper versus lower  #History  of lower GI bleed in the setting of hemorrhoids s/p resection  #Viral gastroenteritis  -Patient had 1 bowel movement on 2/18 that was dark blood picture attached. Had colonoscopy on 2022 that showed hemorrhoids on perianal exam with 2 polyps in the ascending colon and mild diverticulosis in the sigmoid colon, 2 polyps in the rectum which was removed.  Patient has no more bloody bowel movement and hemoglobin has been stable.    Plan   -Start Cardiac diet, will monitor any more bloody bowel movement.  -Switch to pantoprazole 40 mg p.o. once daily  -Per GI patient will be evaluated as outpatient for colonoscopy.   -Will monitor CBC on transfuse if hemoglobin less than 7  -GI consulted, appreciate recommendation.    #R Ankle fracture  -Initially in the ED x-ray showed confirmed a bimalleolar fracture  -CT right ankle showing commuted oblique fracture of the lateral malleolus and above the level of the syndesmosis. There is lateral displacement of 1 cortical thickness and associated widening of the medial ankle mortise.There are small ossific fragments posterior and medial to the medial aspect of the navicular bone  -Patient underwent ORIF by Dr. Lopes on 2/19   -For DVT prophylaxisTonight will start aspirin 81 mg twice daily for 14 days   -Ortho consulted, appreciate recommendation  -PT OT    #Nonoliguric, ASHANTI on CKD3  -Aetiology is most likely prerenal 2/2 likely gastroenteritis, baseline (1.2-1.3)  PLAN:  -Status post IV resuscitation in the ED.  -Placed on IV maintenance fluids with  L/h   -Repeated RFP showed improvement in creatinine function to 1.3  -Daily RFP's  -Avoid nephrotoxic agents  -Renal adjustment of medications.     Chronic conditions:  OAB  HTN  HLD  GERD  Depression  -Continue home meds as tolerated once med rec is complete     Diet: NPO  DVT ppx: Heparin  Fluids: LR  On Telemetry  Code Status: Full code  Dispo: Anticipate 1 more midnight for PT OT assessment and possible discharge  tomorrow      Assessment and plan discussed with my attending.   Magdi Hamilton MD   Internal Medicine, PGY-2 .

## 2025-02-21 ENCOUNTER — DOCUMENTATION (OUTPATIENT)
Dept: HOME HEALTH SERVICES | Facility: HOME HEALTH | Age: 75
End: 2025-02-21
Payer: MEDICARE

## 2025-02-21 ENCOUNTER — PHARMACY VISIT (OUTPATIENT)
Dept: PHARMACY | Facility: CLINIC | Age: 75
End: 2025-02-21
Payer: COMMERCIAL

## 2025-02-21 ENCOUNTER — HOME HEALTH ADMISSION (OUTPATIENT)
Dept: HOME HEALTH SERVICES | Facility: HOME HEALTH | Age: 75
End: 2025-02-21
Payer: MEDICARE

## 2025-02-21 VITALS
OXYGEN SATURATION: 96 % | SYSTOLIC BLOOD PRESSURE: 150 MMHG | RESPIRATION RATE: 16 BRPM | BODY MASS INDEX: 32.87 KG/M2 | TEMPERATURE: 96.8 F | HEIGHT: 67 IN | DIASTOLIC BLOOD PRESSURE: 77 MMHG | HEART RATE: 70 BPM | WEIGHT: 209.44 LBS

## 2025-02-21 LAB
ALBUMIN SERPL BCP-MCNC: 3.8 G/DL (ref 3.4–5)
ANION GAP SERPL CALC-SCNC: 14 MMOL/L (ref 10–20)
BUN SERPL-MCNC: 20 MG/DL (ref 6–23)
CALCIUM SERPL-MCNC: 8.6 MG/DL (ref 8.6–10.3)
CHLORIDE SERPL-SCNC: 102 MMOL/L (ref 98–107)
CO2 SERPL-SCNC: 26 MMOL/L (ref 21–32)
CREAT SERPL-MCNC: 1.36 MG/DL (ref 0.5–1.3)
EGFRCR SERPLBLD CKD-EPI 2021: 54 ML/MIN/1.73M*2
ERYTHROCYTE [DISTWIDTH] IN BLOOD BY AUTOMATED COUNT: 12.1 % (ref 11.5–14.5)
GLUCOSE SERPL-MCNC: 99 MG/DL (ref 74–99)
HCT VFR BLD AUTO: 39.3 % (ref 41–52)
HGB BLD-MCNC: 12.8 G/DL (ref 13.5–17.5)
MAGNESIUM SERPL-MCNC: 2.05 MG/DL (ref 1.6–2.4)
MCH RBC QN AUTO: 31 PG (ref 26–34)
MCHC RBC AUTO-ENTMCNC: 32.6 G/DL (ref 32–36)
MCV RBC AUTO: 95 FL (ref 80–100)
NRBC BLD-RTO: 0 /100 WBCS (ref 0–0)
PHOSPHATE SERPL-MCNC: 3.9 MG/DL (ref 2.5–4.9)
PLATELET # BLD AUTO: 146 X10*3/UL (ref 150–450)
POTASSIUM SERPL-SCNC: 3.7 MMOL/L (ref 3.5–5.3)
RBC # BLD AUTO: 4.13 X10*6/UL (ref 4.5–5.9)
SODIUM SERPL-SCNC: 138 MMOL/L (ref 136–145)
WBC # BLD AUTO: 5.9 X10*3/UL (ref 4.4–11.3)

## 2025-02-21 PROCEDURE — 2500000001 HC RX 250 WO HCPCS SELF ADMINISTERED DRUGS (ALT 637 FOR MEDICARE OP): Performed by: STUDENT IN AN ORGANIZED HEALTH CARE EDUCATION/TRAINING PROGRAM

## 2025-02-21 PROCEDURE — 80069 RENAL FUNCTION PANEL: CPT | Performed by: STUDENT IN AN ORGANIZED HEALTH CARE EDUCATION/TRAINING PROGRAM

## 2025-02-21 PROCEDURE — 85027 COMPLETE CBC AUTOMATED: CPT | Performed by: STUDENT IN AN ORGANIZED HEALTH CARE EDUCATION/TRAINING PROGRAM

## 2025-02-21 PROCEDURE — RXMED WILLOW AMBULATORY MEDICATION CHARGE

## 2025-02-21 PROCEDURE — 97116 GAIT TRAINING THERAPY: CPT | Mod: GP,CQ

## 2025-02-21 PROCEDURE — 97110 THERAPEUTIC EXERCISES: CPT | Mod: GP,CQ

## 2025-02-21 PROCEDURE — 99232 SBSQ HOSP IP/OBS MODERATE 35: CPT | Performed by: PHYSICIAN ASSISTANT

## 2025-02-21 PROCEDURE — 99239 HOSP IP/OBS DSCHRG MGMT >30: CPT

## 2025-02-21 PROCEDURE — 83735 ASSAY OF MAGNESIUM: CPT | Performed by: STUDENT IN AN ORGANIZED HEALTH CARE EDUCATION/TRAINING PROGRAM

## 2025-02-21 PROCEDURE — 36415 COLL VENOUS BLD VENIPUNCTURE: CPT | Performed by: STUDENT IN AN ORGANIZED HEALTH CARE EDUCATION/TRAINING PROGRAM

## 2025-02-21 PROCEDURE — 2500000001 HC RX 250 WO HCPCS SELF ADMINISTERED DRUGS (ALT 637 FOR MEDICARE OP)

## 2025-02-21 PROCEDURE — 97530 THERAPEUTIC ACTIVITIES: CPT | Mod: GP,CQ

## 2025-02-21 RX ORDER — AMOXICILLIN 250 MG
1 CAPSULE ORAL DAILY
Qty: 10 TABLET | Refills: 0 | Status: SHIPPED | OUTPATIENT
Start: 2025-02-21 | End: 2025-03-03

## 2025-02-21 RX ORDER — TAMSULOSIN HYDROCHLORIDE 0.4 MG/1
0.4 CAPSULE ORAL EVERY OTHER DAY
Start: 2025-02-21

## 2025-02-21 RX ORDER — PANTOPRAZOLE SODIUM 40 MG/1
40 TABLET, DELAYED RELEASE ORAL
Qty: 30 TABLET | Refills: 0 | Status: SHIPPED | OUTPATIENT
Start: 2025-02-22 | End: 2025-03-24

## 2025-02-21 RX ORDER — POTASSIUM CHLORIDE 1.5 G/1.58G
40 POWDER, FOR SOLUTION ORAL ONCE
Status: COMPLETED | OUTPATIENT
Start: 2025-02-21 | End: 2025-02-21

## 2025-02-21 RX ORDER — OXYCODONE HYDROCHLORIDE 5 MG/1
5 TABLET ORAL EVERY 6 HOURS PRN
Qty: 20 TABLET | Refills: 0 | Status: SHIPPED | OUTPATIENT
Start: 2025-02-21 | End: 2025-02-26

## 2025-02-21 RX ORDER — NAPROXEN SODIUM 220 MG/1
81 TABLET, FILM COATED ORAL 2 TIMES DAILY
Qty: 26 TABLET | Refills: 0 | Status: SHIPPED | OUTPATIENT
Start: 2025-02-21 | End: 2025-03-06

## 2025-02-21 RX ORDER — ROSUVASTATIN CALCIUM 5 MG/1
5 TABLET, COATED ORAL EVERY OTHER DAY
Start: 2025-02-21

## 2025-02-21 RX ADMIN — POTASSIUM CHLORIDE 40 MEQ: 1.5 POWDER, FOR SOLUTION ORAL at 10:21

## 2025-02-21 RX ADMIN — OXYCODONE HYDROCHLORIDE 5 MG: 5 TABLET ORAL at 00:15

## 2025-02-21 RX ADMIN — ASPIRIN 81 MG CHEWABLE TABLET 81 MG: 81 TABLET CHEWABLE at 10:21

## 2025-02-21 RX ADMIN — OXYCODONE HYDROCHLORIDE 5 MG: 5 TABLET ORAL at 10:29

## 2025-02-21 RX ADMIN — AMLODIPINE BESYLATE 2.5 MG: 2.5 TABLET ORAL at 10:21

## 2025-02-21 RX ADMIN — PANTOPRAZOLE SODIUM 40 MG: 40 TABLET, DELAYED RELEASE ORAL at 06:05

## 2025-02-21 ASSESSMENT — PAIN SCALES - GENERAL
PAINLEVEL_OUTOF10: 6
PAINLEVEL_OUTOF10: 8
PAINLEVEL_OUTOF10: 0 - NO PAIN
PAINLEVEL_OUTOF10: 7

## 2025-02-21 ASSESSMENT — COGNITIVE AND FUNCTIONAL STATUS - GENERAL
STANDING UP FROM CHAIR USING ARMS: A LITTLE
MOVING TO AND FROM BED TO CHAIR: A LITTLE
WALKING IN HOSPITAL ROOM: A LITTLE
MOBILITY SCORE: 20
CLIMB 3 TO 5 STEPS WITH RAILING: A LITTLE

## 2025-02-21 ASSESSMENT — PAIN - FUNCTIONAL ASSESSMENT
PAIN_FUNCTIONAL_ASSESSMENT: 0-10

## 2025-02-21 ASSESSMENT — PAIN DESCRIPTION - LOCATION: LOCATION: LEG

## 2025-02-21 ASSESSMENT — PAIN DESCRIPTION - ORIENTATION: ORIENTATION: RIGHT

## 2025-02-21 NOTE — PROGRESS NOTES
02/21/25 1325   Discharge Planning   Type of Residence Private residence   Home or Post Acute Services In home services   Type of Home Care Services Home OT;Home PT   Expected Discharge Disposition Home H   Does the patient need discharge transport arranged? No     Met with patient and spouse regarding home care arrangements. He was working with therapy with the correct use of crutches. His wife is his caregiver. Dr. Lopes will follow for home care orders. Message sent to [facility] intake nurse.

## 2025-02-21 NOTE — CARE PLAN
The patient's goals for the shift include To not fall all shift    The clinical goals for the shift include See POC      Problem: Fall/Injury  Goal: Not fall by end of shift  Outcome: Progressing

## 2025-02-21 NOTE — DISCHARGE SUMMARY
Discharge Diagnosis  Closed fracture of right ankle, initial encounter    Issues Requiring Follow-Up  Follow-up with orthopedic surgery for ankle procedure.  Follow-up with GI with Olga Nolasco for further workup    Follow-up with primary care for hospital course.    New medications:    Aspirin 81 mg twice daily for 14 days  Oxycodone 5 mg as needed for pain  Pantoprazole 40 mg once daily     Discharge Meds     Medication List      START taking these medications     aspirin 81 mg chewable tablet; Chew 1 tablet (81 mg) 2 times a day for   26 doses.; Replaces: aspirin 81 mg EC tablet   oxyCODONE 5 mg immediate release tablet; Commonly known as: Roxicodone;   Take 1 tablet (5 mg) by mouth every 6 hours if needed for moderate pain (4   - 6) or severe pain (7 - 10) for up to 5 days.   pantoprazole 40 mg EC tablet; Commonly known as: ProtoNix; Take 1 tablet   (40 mg) by mouth once daily in the morning. Take before meals. Do not   crush, chew, or split.; Start taking on: February 22, 2025   Senna Plus 8.6-50 mg tablet; Generic drug: sennosides-docusate sodium;   Take 1 tablet by mouth once daily for 10 days.     CONTINUE taking these medications     * amLODIPine 2.5 mg tablet; Commonly known as: Norvasc; Take 1 tablet   (2.5 mg) by mouth once daily.   * amLODIPine 2.5 mg tablet; Commonly known as: Norvasc; Take 1 tablet   (2.5 mg) by mouth once daily.   CALCIUM-MAGNESIUM-ZINC ORAL   cyclobenzaprine 10 mg tablet; Commonly known as: Flexeril; Take 1 tablet   (10 mg) by mouth 3 times a day as needed for muscle spasms.   potassium citrate 99 mg capsule   rosuvastatin 5 mg tablet; Commonly known as: Crestor; Take 1 tablet (5   mg) by mouth every other day. Alternate with Tamsulosin at bedtime   tamsulosin 0.4 mg 24 hr capsule; Commonly known as: Flomax; Take 1   capsule (0.4 mg) by mouth every other day. Alternate with Rosuvastatin  * This list has 2 medication(s) that are the same as other medications   prescribed for you. Read  the directions carefully, and ask your doctor or   other care provider to review them with you.     STOP taking these medications     aspirin 81 mg EC tablet; Replaced by: aspirin 81 mg chewable tablet       Test Results Pending At Discharge  Pending Labs       No current pending labs.            Hospital Course  74-year-old male with a history of HTN, HLD, prostate cancer in remission, OAB, CKD3, GERD, and depression presenting with syncope, diarrhea, and a right ankle fracture. Symptoms likely secondary to acute diarrheal illness, possibly infectious given recent sick contact, leading to volume depletion and a syncopal episode.     In the ED workup revealed mild ASHANTI (Cr 1.77, BUN 25), mild leukocytosis (WBC 11.6), and imaging findings of colitis. No ongoing diarrhea or abdominal pain in the hospital. CT head and C-spine were unremarkable. EKG showed new T wave inversions compared to 2010; serial troponins were negative. Ankle imaging confirmed a bimalleolar fracture, currently splinted with intact neurovascular status.  He received IV fluids for ASHANTI, and orthopedic consulted for ORIF and patient was admitted for teaching service for further cardiological workup.  Initially, repeated echo showed EF of 60% with right ventricular dilatation which was concerning for PE. Subsequently patient underwent CT scan that showed no PE but showed significant enlargement of bilateral atria with mild right ventricular dilatation with normal main pulmonary artery.     During her hospital admission, he underwent ORIF by Dr. Lopes on 2/19 and with plan to follow-up as an outpatient.  I was started on aspirin twice daily for 14 days as a DVT prophylaxis.  Also he had a bloody bowel movement and was evaluated by gastroenterology with plan to undergo outpatient colonoscopy due to stable hemoglobin and absence of any further bloody bowel movement.  Patient continued to improve clinically and was deemed medically stable for  discharge. Patient was discharged in stable condition, with plans to follow-up with the PCP and gastroenterology and orthopedic clinics for further care.    Pertinent Physical Exam At Time of Discharge  Physical Exam  General: Not in acute distress, alert  HEENT: PERRLA, head intact and normocephalic  Neck: Normal to inspection  Lungs: Clear to auscultation, work of breathing within normal limit  Cardiac: Regular rate and rhythm  Abdomen: Soft mild tenderness in the left lower quadrant, positive bowel sounds  : Exam deferred  Skin: Intact  Hematology: No petechia or excessive ecchymosis  Musculoskeletal: Left ankle splint status post ORIF  Neurological: Alert awake oriented, no focal deficit, cranial nerves grossly intact  Psych: No suicidal ideation or homicidal ideation    Outpatient Follow-Up  Future Appointments   Date Time Provider Department Center   6/6/2025  2:50 PM Romero Hitchcock, APRN-CNP [id number] Academic     Assessment and plan discussed with my attending.   Magdi Hamilton MD   Internal Medicine, PGY-2 .

## 2025-02-21 NOTE — PROGRESS NOTES
Occupational Therapy    Occupational Therapy    Evaluation    Patient Name: Julio Miller  MRN: [id number]  Today's Date: 2/20/2025  Time Calculation  Start Time: 1157  Stop Time: 1227  Time Calculation (min): 30 min  3127/3127-A    Assessment  IP OT Assessment  OT Assessment: Pt pleasant and cooperative. Pt receptive to education on DME and compensatory techniques to complete ADL's onec home. Recommend home with [facility] OT to ensure safety and independence with ADL's at home  Prognosis: Good  End of Session Communication: Bedside nurse  End of Session Patient Position: Up in chair, Alarm on    Plan:  Treatment Interventions: ADL retraining, Functional transfer training, UE strengthening/ROM, Endurance training, Neuromuscular reeducation  OT Frequency: 3 times per week  OT Discharge Recommendations: Low intensity level of continued care ([facility] OT)  Equipment Recommended upon Discharge: Wheeled walker  OT Recommended Transfer Status: Minimal assist  OT - OK to Discharge: Yes (to next level of care)    Subjective     Current Problem:  1. Closed fracture of right ankle, initial encounter  Referral to Orthopaedic Surgery    CANCELED: Case Request Operating Room: ORIF, ANKLE    CANCELED: Case Request Operating Room: ORIF, ANKLE    CANCELED: Case Request Operating Room: ORIF, ANKLE    CANCELED: Case Request Operating Room: ORIF, ANKLE      2. Syncope, unspecified syncope type  Transthoracic Echo (TTE) Limited    Transthoracic Echo (TTE) Limited      3. ASHANTI (acute kidney injury) (CMS-HCC)        4. Lower GI bleed  Colonoscopy Diagnostic          General:  General  Reason for Referral: recent surgery  Referred By: Shelly Boothe  Prior to Session Communication: Bedside nurse  Patient Position Received: Up in chair, Alarm on    Per EMR: pt presenting with syncope, diarrhea and ankle fracture.  Symptoms started this 2/17/2025 around 4:30 PM where began having sharp left-sided abdominal pain and diffuse watery diarrhea for multiple  episodes.  Getting up Stairs lying down the bed then felt like he had to go once again got up started walking on the hallway and felt significantly diaphoretic and passed out.  He hit his head on the wall as well as fell onto his right ankle.  He was able to get up and limp into the bathroom where he had more episodes of watery diarrhea while off sweating.  After these episodes he did notice his severe ankle pain and subsequently called EMS and arrived in our ER.  Patient now no longer complaining of abdominal pain and has not had additional episodes of diarrhea here.  Denies any headache, chest pain, shortness of breath, abdominal pain, burning or discomfort with urination or any blood present in stool.  He has a known sick contact that he saw on Saturday who ended up cooking the food who had similar symptoms of diarrhea and diaphoresis.  He has had no travel, and has otherwise had a normal diet for him.     In the ER patient was worked up for syncopal event with CBC, CMP, serial troponins, BNP, COVID and flu.  Labs significant for showing mild ASHANTI with creatinine to 1.77 and BUN to 25, otherwise electrolytes within normal limits.  Troponins within normal limits.  Patient has mild leukocytosis to 11.6, COVID and flu negative.  Head imaging CT head and CT C-spine unremarkable.  Other imaging included chest x-ray, x-ray foot and ankle as well as CT abdomen pelvis and CT ankle.  Ankle imaging showing bimalleolar fracture.  CT abdomen pelvis showed likely colitis consistent with patient's diarrheal illness.  Chest x-ray unremarkable EKG showing T wave inversions new from previous however most recent one was in 2010.  Patient received Tylenol and fentanyl in the ED for pain control.  Patient splinted in the ER and is neurovascularly intact following splinting.     2/19/2025  3:10 PM ORIF, ANKLE    Arsenio Lopes MD              Signed         ORIF, ANKLE (R) Operative Note     Date: 2/17/2025 - 2/19/2025               OR Location: [facility] OR     Name: Julio Miller, : 1950, Age: 74 y.o., MRN: [id number], Sex: male     Diagnosis  Pre-op Diagnosis      * Closed fracture of right ankle, initial encounter [Z04.262A] Post-op Diagnosis     * Closed fracture of right ankle, initial encounter [A32.424M]      Procedures  ORIF, ANKLE  24334 - KS OPEN TX DISTAL FIBULAR FRACTURE LAT MALLEOLUS  Right distal fibula open reduction internal fixation     Surgeons      * Arsenio Lopes - Primary                Precautions:  LE Weight Bearing Status: Right Non-Weight Bearing (2/ R ankle ORIF)  Medical Precautions: Fall precautions  Precautions Comment: bed/chair alarm    Pain:  Pain Assessment  Pain Assessment: 0-10  0-10 (Numeric) Pain Score: 0 - No pain    Objective     Cognition:  Overall Cognitive Status: Within Functional Limits  Orientation Level: Oriented X4    Home Living/PLOF:  Pt lives in house with wife with 1+4 EMANI with rail.  1 floor set up. Has tub shower without DME.  IND with mobility and ADLs prior to admission.  Owns SPC, FWW, and crutches.  Pt drves.  Shared IADLs with wife.  Pt was active prior to admission.     ADL:  Eating Assistance: Independent  Grooming Assistance: Stand by (setup)  Bathing Assistance: Minimal  UE Dressing Assistance: Stand by  LE Dressing Assistance: Minimal  Toileting Assistance with Device: Stand by    Activity Tolerance:  Endurance: Tolerates 10 - 20 min exercise with multiple rests    Bed Mobility/Transfers:   Supine-sit: pt was up in bedside chair    Sit-stand: CGA  Stand-sit: SBA    Ambulation/Gait Training:  Pt ambulated in room using wheeled walker, maintaining NWB R LE      Sitting Balance:  Static Sitting Balance  Static Sitting-Comment/Number of Minutes: good  Dynamic Sitting Balance  Dynamic Sitting-Comments: good    Standing Balance:  Static Standing Balance  Static Standing-Comment/Number of Minutes: fair  Dynamic Standing Balance  Dynamic Standing-Comments:  fair-    Sensation:  Light Touch: No apparent deficits    Strength:  Strength Comments: B UE's WFL    Extremities: RUE   RUE : Within Functional Limits and LUE   LUE: Within Functional Limits    Outcome Measures: [facility] Daily Activity  Putting on and taking off regular lower body clothing: A little  Bathing (including washing, rinsing, drying): A little  Putting on and taking off regular upper body clothing: None  Toileting, which includes using toilet, bedpan or urinal: A little  Taking care of personal grooming such as brushing teeth: A little  Eating Meals: None  Daily Activity - Total Score: 20    EDUCATION:  Education  Individual(s) Educated: Patient  Education Provided:  (safety; compensatory techniques)  Education Documentation  Body Mechanics, taught by Marlene Love OT at 2/20/2025  9:48 PM.  Learner: Patient  Readiness: Acceptance  Method: Explanation  Response: Verbalizes Understanding    Precautions, taught by Marlene Love OT at 2/20/2025  9:48 PM.  Learner: Patient  Readiness: Acceptance  Method: Explanation  Response: Verbalizes Understanding    ADL Training, taught by Marlene Love OT at 2/20/2025  9:48 PM.  Learner: Patient  Readiness: Acceptance  Method: Explanation  Response: Verbalizes Understanding    Education Comments  No comments found.        Goals:   Encounter Problems       Encounter Problems (Active)       OT Goals       OT Goal 1 (Progressing)       Start:  02/20/25    Expected End:  03/06/25       Pt with complete all transfers safely with modified independence           OT Goal 2 (Progressing)       Start:  02/20/25    Expected End:  03/06/25       Pt will complete ADL's and mobility with good stand balance following NWB R LE           OT Goal 3 (Progressing)       Start:  02/20/25    Expected End:  03/06/25       Pt will complete LB dressing with modified independence using adaptive device as needed           OT Goal 4 (Progressing)       Start:  02/20/25    Expected End:  03/06/25        Pt will complete grooming ADL's with supervision at sink and good stand balance while maintaining NWB R LE                Treatment: Pt was up in bedside chair. Pt completed all transfers with CGA. Pt ambulated in room with CGA using wheeled walker, and maintaining NWB R LE. Pt stood at sink with fair stand balance to complete grooming ADL's while holding R LE off the ground. Pt returned to and remained in bedside chair with chair alarm on and call light within reach.   Pt educated on compensatory techniques and AE that may be beneficial to complete ADL's independently. Pt was receptive to education.

## 2025-02-21 NOTE — HH CARE COORDINATION
Home Care received a Referral for Nursing and Physical Therapy. We have processed the referral for a Start of Care on 2/23 - 2/24/25.     If you have any questions or concerns, please feel free to contact us at [phone]. Follow the prompts, enter your five digit zip code, and you will be directed to your care team on [facility] 2.

## 2025-02-21 NOTE — PROGRESS NOTES
Department of Internal Medicine  Gastroenterology  Progress note      Subjective  GI is following for hematochezia    Today, he denies abdominal pain, nausea, or vomiting. He has not had a bowel movement but is passing gas. He did have some acid reflux after eating pizza which resolved with TUMs      Current Medication    Current Facility-Administered Medications:     acetaminophen (Tylenol) tablet 650 mg, 650 mg, oral, q4h PRN, 650 mg at 02/18/25 1438 **OR** acetaminophen (Tylenol) oral liquid 650 mg, 650 mg, nasogastric tube, q4h PRN **OR** acetaminophen (Tylenol) suppository 650 mg, 650 mg, rectal, q4h PRN, Shelly Boothe MD    amLODIPine (Norvasc) tablet 2.5 mg, 2.5 mg, oral, Daily, Shelly Boothe MD, 2.5 mg at 02/20/25 0840    aspirin chewable tablet 81 mg, 81 mg, oral, BID, Magdi Hamilton MD, 81 mg at 02/20/25 2043    [Held by provider] heparin (porcine) injection 5,000 Units, 5,000 Units, subcutaneous, q8h JANAE, Shelly Boothe MD, 5,000 Units at 02/19/25 0309    oxyCODONE (Roxicodone) immediate release tablet 5 mg, 5 mg, oral, q6h PRN, Shelly Boothe MD, 5 mg at 02/21/25 0015    pantoprazole (ProtoNix) EC tablet 40 mg, 40 mg, oral, Daily before breakfast, Magdi Hamilton MD, 40 mg at 02/21/25 0605    potassium chloride (Klor-Con) packet 40 mEq, 40 mEq, oral, Once, Magdi Hamilton MD    rosuvastatin (Crestor) tablet 5 mg, 5 mg, oral, Every other day, Shelly Boothe MD, 5 mg at 02/19/25 2105    tamsulosin (Flomax) 24 hr capsule 0.4 mg, 0.4 mg, oral, Nightly, Shelly Boothe MD, 0.4 mg at 02/19/25 2105    Past Medical History  Active Ambulatory Problems     Diagnosis Date Noted    Abnormal echocardiogram 05/12/2023    Acquired abduction deformity of right foot 03/19/2019    Aortic valve stenosis 06/03/2022    Atherosclerotic heart disease of native coronary artery without angina pectoris 05/12/2023    Benign essential hypertension 02/03/2016    Capsulitis of foot, right 06/02/2020     "Depression 05/12/2023    Fat pad atrophy of foot 06/02/2020    Fatigue 05/12/2023    Generalized weakness 05/12/2023    GERD (gastroesophageal reflux disease) 05/12/2023    Heart murmur 05/12/2023    Hemorrhoids 01/29/2016    Hyperlipidemia 02/03/2016    Hypertension 05/12/2023    Hypotestosteronism 05/12/2023    Low back pain 05/12/2023    Low testosterone 02/03/2016    Metatarsalgia of right foot 06/02/2020    Barr's neuroma 05/12/2023    Muscle cramps 05/12/2023    Pain of left lower extremity 05/12/2023    Palpitations 05/12/2023    Prostate cancer (Multi) 05/12/2023    Spermatocele 05/12/2023    Stage 1 chronic kidney disease 05/12/2023    Overactive bladder 12/14/2023    Mild major depression (CMS-HCC) 03/04/2024    Stage 3a chronic kidney disease (Multi) 08/20/2024     Resolved Ambulatory Problems     Diagnosis Date Noted    Elevated PSA 05/12/2023    Equinus deformity of both feet 06/02/2020    Gastroesophageal reflux disease 05/12/2023    High cholesterol 05/12/2023    Lower urinary tract symptoms 05/12/2023    Otitis, serous 05/12/2023    Post-op pain 03/23/2019    Prostatitis 05/12/2023    Rectal bleeding 01/29/2016    SOB (shortness of breath) 05/12/2023     Past Medical History:   Diagnosis Date    Encounter for general adult medical examination without abnormal findings 03/01/2022    Encounter for screening for malignant neoplasm of colon     Encounter for screening for malignant neoplasm of colon 03/18/2022    Encounter for screening for malignant neoplasm of prostate 10/21/2015    Other conditions influencing health status 02/17/2021    Personal history of diseases of the skin and subcutaneous tissue 01/15/2016    Personal history of other diseases of the respiratory system 02/19/2021    Tinea cruris        PHYSICAL EXAM  VS: /75 (BP Location: Right arm, Patient Position: Lying)   Pulse 71   Temp 36.6 °C (97.9 °F) (Temporal)   Resp 18   Ht 1.702 m (5' 7.01\")   Wt 95 kg (209 lb 7 oz)   " SpO2 97%   BMI 32.79 kg/m²  Body mass index is 32.79 kg/m².  Physical Exam  Constitutional:       General: He is not in acute distress.     Appearance: Normal appearance.   HENT:      Head: Normocephalic and atraumatic.      Mouth/Throat:      Mouth: Mucous membranes are moist.      Pharynx: Oropharynx is clear.   Eyes:      General: No scleral icterus.     Extraocular Movements: Extraocular movements intact.      Conjunctiva/sclera: Conjunctivae normal.      Pupils: Pupils are equal, round, and reactive to light.   Cardiovascular:      Rate and Rhythm: Normal rate and regular rhythm.      Heart sounds: No murmur heard.  Pulmonary:      Effort: Pulmonary effort is normal.      Breath sounds: No wheezing or rhonchi.   Abdominal:      General: Bowel sounds are normal. There is no distension.      Palpations: Abdomen is soft. There is no mass.      Tenderness: There is no abdominal tenderness.      Hernia: No hernia is present.   Musculoskeletal:         General: No swelling or deformity.   Skin:     General: Skin is warm.      Coloration: Skin is not jaundiced.   Neurological:      General: No focal deficit present.      Mental Status: He is alert and oriented to person, place, and time.   Psychiatric:         Mood and Affect: Mood normal.          DATA  Recent blood work was reviewed and discussed with the patient   Results from last 7 days   Lab Units 02/21/25  0546   WBC AUTO x10*3/uL 5.9   RBC AUTO x10*6/uL 4.13*   HEMOGLOBIN g/dL 12.8*   HEMATOCRIT % 39.3*   MCV fL 95   MCHC g/dL 32.6   RDW % 12.1   PLATELETS AUTO x10*3/uL 146*       Results from last 72 hours   Lab Units 02/21/25  0546   SODIUM mmol/L 138   POTASSIUM mmol/L 3.7   CHLORIDE mmol/L 102   CO2 mmol/L 26   BUN mg/dL 20   CREATININE mg/dL 1.36*   CALCIUM mg/dL 8.6                   RADIOLOGY REVIEW  NA    ENDOSCOPIC REVIEW  NA    IMPRESSION/RECOMMENDATIONS  Mr. Julio Miller is a 74 y.o. male with a past medical history of GERD, HTN, dyslipidemia who  presented with syncope diarrhea and ankle fracture.     Bloody diarrhea -Documented photos showed BRBPR; Hgb (2/21) 12.8 today, baseline 13-14. Low concern for rapid upper. Possible viral etiology, ischemic colitis, or hemorrhoidal bleeding   Colitis, possible viral gastroenteritis   -CT as above showing colitis, c diff and stool pathogen negative   -Prior colonoscopy (2022) showed internal hemorrhoids    PLAN  -No colonoscopy this admission, plan for outpatient colonoscopy with Dr Olga Nolasco at [facility]  -Currently on pantoprazole 40 mg daily  -Currenlty on cardiac diet  -Continue supportive care per primary team    Discussed with Dr Hernandez, GI to sign off, please call with questions or concerns      (Electronically signed byLore Das PA-C on 2/21/2025 at 9:06 AM)

## 2025-02-21 NOTE — CARE PLAN
The patient's goals for the shift include hopefully get discharged today    The clinical goals for the shift include pow will remain 92% or greater through end of shift

## 2025-02-21 NOTE — PROGRESS NOTES
Spiritual Care Visit  Spiritual Care Request    Reason for Visit:  Routine Visit: Introduction     Request Received From:       Focus of Care:  Visited With: Patient and family together         Refer to :          Spiritual Care Assessment    Spiritual Assessment:                      Care Provided:  Intended Effects: Promote sense of peace, Preserve dignity and respect, Meaning-making  Methods: Offer spiritual/Jew support  Interventions: Share words of hope and inspiration, [place]    Sense of Community and or Jewish Affiliation:  Buddhism         Addressed Needs/Concerns and/or Allyson Through:          Outcome:        Advance Directives:         Spiritual Care Annotation    Annotation:  Patient was with his wife.   encouraged him in his quang and prayed at his request.

## 2025-02-22 NOTE — PROGRESS NOTES
Physical Therapy    Physical Therapy Treatment    Patient Name: Julio Miller  MRN: [id number]  Today's Date: 2/21/2025  Time Calculation  Start Time: 1205  Stop Time: 1259  Time Calculation (min): 54 min     3127/3127-A     02/21/25 1210   PT  Visit   PT Received On 02/21/25   General   Reason for Referral recent surgery   Referred By Shelly Boothe   Prior to Session Communication Bedside nurse   Patient Position Received Bed, 3 rail up;Alarm on   Preferred Learning Style verbal;visual   General Comment Pt. was in bed when I arrived. He was agreable to PT. No c/o pain at rest.   Precautions   LE Weight Bearing Status Right Non-Weight Bearing  (2/2 R ankle ORIF)   Medical Precautions Fall precautions   Precautions Comment bed/chair alarm   Cognition   Orientation Level Oriented X4   Therapeutic Exercise   Therapeutic Exercise Performed Yes   Therapeutic Exercise Activity 1 Supine Ex's Heel slides.,   Therapeutic Exercise Activity 2 Seated Ex's: LAQ, Seated marches x10   Bed Mobility   Bed Mobility Yes   Bed Mobility 1   Bed Mobility 1 Supine to sitting;Sitting to supine   Level of Assistance 1 Modified independent   Bed Mobility Comments 1 CGA to scoot to the get to the EOB>   Ambulation/Gait Training   Ambulation/Gait Training Performed Yes   Ambulation/Gait Training 1   Surface 1 Level tile   Device 1 Rolling walker   Gait Support Devices Gait belt   Assistance 1 Contact guard   Comments/Distance (ft) 1 Pt. ambulated 15'x4. Cues for safety and pacing. Pt. performed pre-stair traing drills standing balance with the walker, balance with single crutch in left hand and right hand on the handrail.   Stairs   Stairs Yes   Stairs   Rails 1 Right  (for ascent)   Device 1 Single crutch  (Left hand for ascent)   Support Devices 1 Gait belt   Assistance 1 Contact guard;Minimum assistance   Comment/Number of Steps 1 Pt. ascened/descended 4 steps with the cructch and handrail. Pt. performed 4 stairs x2. First attemp pt. sandra  min assist x1. Second attempt pt. was needed CGA..   Activity Tolerance   Endurance Tolerates 30 min exercise with multiple rests;Tolerates 10 - 20 min exercise with multiple rests   PT Assessment   PT Assessment Results Decreased strength;Decreased range of motion;Impaired balance;Decreased mobility   Rehab Prognosis Good   End of Session Communication Bedside nurse   End of Session Patient Position Up in chair;Alarm on   PT Plan   PT Plan Ongoing PT   PT Frequency Daily   Equipment Recommended upon Discharge Wheeled walker       Assessment/Plan   PT Assessment  PT Assessment Results: Decreased strength, Decreased range of motion, Impaired balance, Decreased mobility  Rehab Prognosis: Good  End of Session Communication: Bedside nurse  End of Session Patient Position: Up in chair, Alarm on     PT Plan  PT Plan: Ongoing PT  PT Frequency: Daily  Equipment Recommended upon Discharge: Wheeled walker  PT - OK to Discharge: Yes    Outcome Measures:  [facility] Basic Mobility  Turning from your back to your side while in a flat bed without using bedrails: None  Moving from lying on your back to sitting on the side of a flat bed without using bedrails: None  Moving to and from bed to chair (including a wheelchair): A little  Standing up from a chair using your arms (e.g. wheelchair or bedside chair): A little  To walk in hospital room: A little  Climbing 3-5 steps with railing: A little  Basic Mobility - Total Score: 20                             EDUCATION:     Education Documentation  Precautions, taught by Sebas Calderón PTA at 2/21/2025  1:30 PM.  Learner: Family, Patient  Readiness: Acceptance  Method: Demonstration  Response: Verbalizes Understanding, Demonstrated Understanding    Body Mechanics, taught by Sebas Calderón PTA at 2/21/2025  1:30 PM.  Learner: Family, Patient  Readiness: Acceptance  Method: Demonstration  Response: Verbalizes Understanding, Demonstrated Understanding    Home Exercise Program, taught by Sebas  SIOBHAN Calderón PTA at 2/21/2025  1:30 PM.  Learner: Family, Patient  Readiness: Acceptance  Method: Demonstration  Response: Verbalizes Understanding, Demonstrated Understanding    Mobility Training, taught by Sebas Calderón PTA at 2/21/2025  1:30 PM.  Learner: Family, Patient  Readiness: Acceptance  Method: Demonstration  Response: Verbalizes Understanding, Demonstrated Understanding    Education Comments  No comments found.        GOALS:  Encounter Problems       Encounter Problems (Resolved)       PT Problem       Pt will be able to perform all bed mobility tasks with Mod I.  (Adequate for Discharge)       Start:  02/20/25    Expected End:  03/06/25    Resolved:  02/21/25         Pt will perform all transfers with Mod I and FWW with proper safety mechanics.   (Adequate for Discharge)       Start:  02/20/25    Expected End:  03/06/25    Resolved:  02/21/25         Pt will ambulate 150 ft with Mod I using FWW for improved functional independence.  (Adequate for Discharge)       Start:  02/20/25    Expected End:  03/06/25    Resolved:  02/21/25         Pt will be able to negotiate 1+4 steps with 1 HR with SBA.  (Adequate for Discharge)       Start:  02/20/25    Expected End:  03/06/25    Resolved:  02/21/25         Pt will be able to maintain RLE NWB precautions while performing functional mobility and tasks.  (Adequate for Discharge)       Start:  02/20/25    Expected End:  03/06/25    Resolved:  02/21/25            Pain - Adult

## 2025-02-23 ENCOUNTER — HOME CARE VISIT (OUTPATIENT)
Dept: HOME HEALTH SERVICES | Facility: HOME HEALTH | Age: 75
End: 2025-02-23
Payer: MEDICARE

## 2025-02-23 VITALS
TEMPERATURE: 97.7 F | HEART RATE: 72 BPM | SYSTOLIC BLOOD PRESSURE: 112 MMHG | DIASTOLIC BLOOD PRESSURE: 60 MMHG | RESPIRATION RATE: 20 BRPM | OXYGEN SATURATION: 99 %

## 2025-02-23 PROCEDURE — 169592 NO-PAY CLAIM PROCEDURE

## 2025-02-23 PROCEDURE — G0299 HHS/HOSPICE OF RN EA 15 MIN: HCPCS | Mod: HHH

## 2025-02-23 ASSESSMENT — ENCOUNTER SYMPTOMS
LOWEST PAIN SEVERITY IN PAST 24 HOURS: 0/10
PAIN: 1
PAIN SEVERITY GOAL: 0/10
HIGHEST PAIN SEVERITY IN PAST 24 HOURS: 2/10
OCCASIONAL FEELINGS OF UNSTEADINESS: 0
APPETITE LEVEL: FAIR
PAIN LOCATION: RIGHT LEG
PAIN LOCATION - PAIN FREQUENCY: INTERMITTENT
DEPRESSION: 0
PERSON REPORTING PAIN: PATIENT
LOSS OF SENSATION IN FEET: 0
PAIN LOCATION - PAIN QUALITY: SORE
LAST BOWEL MOVEMENT: 67259
SUBJECTIVE PAIN PROGRESSION: GRADUALLY IMPROVING
CHANGE IN APPETITE: UNCHANGED
PAIN LOCATION - PAIN SEVERITY: 2/10

## 2025-02-23 ASSESSMENT — PAIN SCALES - PAIN ASSESSMENT IN ADVANCED DEMENTIA (PAINAD)
NEGVOCALIZATION: 0 - NONE.
FACIALEXPRESSION: 0
CONSOLABILITY: 0 - NO NEED TO CONSOLE.
TOTALSCORE: 0
FACIALEXPRESSION: 0 - SMILING OR INEXPRESSIVE.
NEGVOCALIZATION: 0
BODYLANGUAGE: 0 - RELAXED.
CONSOLABILITY: 0
BREATHING: 0
BODYLANGUAGE: 0

## 2025-02-23 ASSESSMENT — ACTIVITIES OF DAILY LIVING (ADL)
ENTERING_EXITING_HOME: MAXIMUM ASSIST
OASIS_M1830: 06

## 2025-02-24 ENCOUNTER — HOME CARE VISIT (OUTPATIENT)
Dept: HOME HEALTH SERVICES | Facility: HOME HEALTH | Age: 75
End: 2025-02-24
Payer: MEDICARE

## 2025-02-24 ENCOUNTER — PATIENT OUTREACH (OUTPATIENT)
Dept: PRIMARY CARE | Facility: CLINIC | Age: 75
End: 2025-02-24
Payer: MEDICARE

## 2025-02-24 ENCOUNTER — TELEPHONE (OUTPATIENT)
Dept: PRIMARY CARE | Facility: CLINIC | Age: 75
End: 2025-02-24
Payer: MEDICARE

## 2025-02-24 LAB
ATRIAL RATE: 97 BPM
P AXIS: 36 DEGREES
P OFFSET: 216 MS
P ONSET: 166 MS
PR INTERVAL: 126 MS
Q ONSET: 229 MS
QRS COUNT: 16 BEATS
QRS DURATION: 74 MS
QT INTERVAL: 360 MS
QTC CALCULATION(BAZETT): 457 MS
QTC FREDERICIA: 422 MS
R AXIS: -4 DEGREES
T AXIS: -31 DEGREES
T OFFSET: 409 MS
VENTRICULAR RATE: 97 BPM

## 2025-02-24 PROCEDURE — G0151 HHCP-SERV OF PT,EA 15 MIN: HCPCS | Mod: HHH

## 2025-02-24 NOTE — TELEPHONE ENCOUNTER
----- Message from Lynnette Miller sent at 2/24/2025  1:03 PM EST -----  Regarding: TCM appt needed  This patient was discharged from:    Discharge Facility: [facility]  Discharge Diagnosis: Closed fracture of right ankle   Admission Date: 2/18/25  Discharge Date: 2/21/25    Pt requests Tuesday afternoon appointment if possible. I did not see any open slots available.  Please reach out to patient and schedule an appointment within 7-13 days from discharge date.

## 2025-02-24 NOTE — PROGRESS NOTES
Discharge Facility: [facility]  Discharge Diagnosis: Closed fracture of right ankle   Admission Date: 2/18/25  Discharge Date: 2/21/25    PCP Appointment Date: TBD - task to office  Specialist Appointment Date:   - Michael  - Olga Nolasco (GI):   Hospital Encounter and Summary Linked: Yes  ED to Hosp-Admission (Discharged) with Shelly Boothe MD; Sonido Bedoya MD (02/17/2025) Closed fracture of right ankle   See discharge assessment below for further details    Wrap Up  Wrap Up Additional Comments: Pt states he is doing better. Reviewed medication changes. Pt reports he does have pain and he is managing with the oxy and tylenol. Pt has a walker and crutches at home. Reports [facility] has been out and PT is coming today.  Denies questions/concerns at this time. (2/24/2025 12:53 PM)    Medications  Medications reviewed with patient/caregiver?: Yes (new/changes only) (2/24/2025 12:53 PM)  Is the patient having any side effects they believe may be caused by any medication additions or changes?: No (2/24/2025 12:53 PM)  Does the patient have all medications ordered at discharge?: Yes (2/24/2025 12:53 PM)  Care Management Interventions: No intervention needed (2/24/2025 12:53 PM)  Prescription Comments: START: aspirin, oxycodone pantoprazole (2/24/2025 12:53 PM)  Is the patient taking all medications as directed (includes completed medication regime)?: Yes (2/24/2025 12:53 PM)  Care Management Interventions: Provided patient education (2/24/2025 12:53 PM)    Appointments  Does the patient have a primary care provider?: Yes (2/24/2025 12:53 PM)  Care Management Interventions: Advised patient to make appointment (2/24/2025 12:53 PM)  Has the patient kept scheduled appointments due by today?: Yes (2/24/2025 12:53 PM)    Self Management  What is the home health agency?: [facility] (2/24/2025 12:53 PM)  Has home health visited the patient within 72 hours of discharge?: Yes (2/24/2025 12:53 PM)  What Durable Medical Equipment (DME) was  ordered?: walker, crutches (2/24/2025 12:53 PM)  Has all Durable Medical Equipment (DME) been delivered?: Yes (2/24/2025 12:53 PM)    Patient Teaching  Does the patient have access to their discharge instructions?: Yes (2/24/2025 12:53 PM)  Care Management Interventions: Reviewed instructions with patient (2/24/2025 12:53 PM)  What is the patient's perception of their health status since discharge?: Improving (2/24/2025 12:53 PM)  Is the patient/caregiver able to teach back the hierarchy of who to call/visit for symptoms/problems? PCP, Specialist, Home Health nurse, Urgent Care, ED, 911: Yes (2/24/2025 12:53 PM)

## 2025-02-24 NOTE — TELEPHONE ENCOUNTER
Rufus Hudson MD  Do [id number] [facility] [id number] minutes ago (2:08 PM)       Since he just got out of the hospital it would be best if I see him this Friday.  More availability and we can see how he does at home.  Please let him know and arrange.  Thanks

## 2025-02-24 NOTE — HOME HEALTH
SKILLED NURSING VISIT FOR ADMISSION TO [facility] ASSESSMENT TEACHING OF MEDICATIONS DISEASE PROCESS. PT TOLERFATED PROCEDURES WELL. PT REPORTS SYNCOPAL EPISODE ON WAY TO BATHROOM WITH FALL. RIGHT FRACTURED ANKLE WITH CAST IN PLACE. PT ELEVATING RLE THROUGHOUT THE DAY. PT ACCEPTS SN AND PHYSICAL THERAPY. SN OFFERED OT BUT PT DECLINED.

## 2025-02-25 SDOH — HEALTH STABILITY: PHYSICAL HEALTH: EXERCISE TYPE: BLE PER HEP

## 2025-02-25 ASSESSMENT — ACTIVITIES OF DAILY LIVING (ADL)
PHYSICAL TRANSFERS ASSESSED: 1
AMBULATION ASSISTANCE: 1
AMBULATION ASSISTANCE ON FLAT SURFACES: 1
CURRENT_FUNCTION: STAND BY ASSIST
AMBULATION ASSISTANCE: STAND BY ASSIST
AMBULATION_DISTANCE/DURATION_TOLERATED: 25'

## 2025-02-25 ASSESSMENT — ENCOUNTER SYMPTOMS
PAIN: 1
HIGHEST PAIN SEVERITY IN PAST 24 HOURS: 5/10
LOWEST PAIN SEVERITY IN PAST 24 HOURS: 2/10
PAIN SEVERITY GOAL: 0/10
PERSON REPORTING PAIN: PATIENT
PAIN LOCATION: RIGHT LEG

## 2025-02-28 ENCOUNTER — APPOINTMENT (OUTPATIENT)
Dept: PRIMARY CARE | Facility: CLINIC | Age: 75
End: 2025-02-28
Payer: MEDICARE

## 2025-02-28 NOTE — PROGRESS NOTES
Subjective   Patient ID: Julio Miller is a 74 y.o. male who presents for No chief complaint on file..  [facility]    Hospital follow up   MAIRA complete   Admitted to [facility]  Admission dates 2/17 -- 2/21  Admitted for  closed fracture of right ankle    11 Days since discharge  Patient had labs, ECG, Transthoracic Echo, X-ray ankle, X-ray foot, X-ray chest, CT, abdomen, CT head, CT cervical spine, CT angio chest, FL fluoro, Peripheral Block    Patient advised to follow up with Orthopedic surgery, GI and PCP    Patient was prescribed aspirin, oxycodone, pantoprazole, senna plus     Taking current medications which were reviewed.  Problem list discussed.    Overall doing well.  Eating okay.  Staying active.    Has no other new problem /question.     ROS  Constitutional- No activity change. No appetite change.  Eyes- Denies vision changes.  Respiratory- No shortness of breath.  Cardiovascular- No palpitations. No chest pain.  GI- No nausea or vomiting. No diarrhea or constipation. Denies abdominal pain.  Musculoskeletal- Denies joint swelling.  Extremities- No edema.  Neurological- Denies headaches. Denies dizziness.  Skin- No rashes.  Psychiatric/Behavioral- Denies significant anxiety, or depressed mood.     Objective     There were no vitals taken for this visit.    Allergies   Allergen Reactions    Cat Dander Itching    Cefuroxime Axetil Diarrhea    House Dust Itching    Mold Unknown    Testosterone Other and Rash     Dermatitis with the patch form       Constitutional-- Well-nourished.  No distress  Head- unremarkable.  Ears- TMs clear.  Eyes- PERRL.  Conjunctiva normal.  Nose- Normal.  No rhinorrhea noted.  Throat- Oropharynx is clear and moist.  Neck- Supple with no thyromegaly.  No significant cervical adenopathy noted.  Pulmonary/Chest- Breath sounds normal with normal effort.  No wheezing.  Heart- Regular rate and rhythm.  No murmur.  Abdomen- Soft and non-tender.  No masses noted.  Musculoskeletal- Normal ROM.  No  significant joint swelling  Extremities- No edema.   Neurological- Alert.  No noted deficits.  Skin- Warm.  No rashes.  Psychiatric/Behavioral- Mood and affect normal.  Behavior normal.     Assessment/Plan   No diagnosis found.       Long talk. Treatment options reviewed.    Continue and take your medications as prescribed.    Health Maintenance issues discussed.    Importance of healthy diet and regular exercise regimen discussed.    We will contact you with any test results ordered. If you do not hear from us, please contact.    Follow-up as instructed or sooner if any problems or symptoms do not resolve as expected.

## 2025-03-04 ENCOUNTER — HOME CARE VISIT (OUTPATIENT)
Dept: HOME HEALTH SERVICES | Facility: HOME HEALTH | Age: 75
End: 2025-03-04
Payer: MEDICARE

## 2025-03-04 PROCEDURE — G0151 HHCP-SERV OF PT,EA 15 MIN: HCPCS | Mod: HHH

## 2025-03-04 SDOH — HEALTH STABILITY: PHYSICAL HEALTH: EXERCISE TYPE: BLE PER HEP

## 2025-03-04 ASSESSMENT — ENCOUNTER SYMPTOMS
PAIN SEVERITY GOAL: 0/10
HIGHEST PAIN SEVERITY IN PAST 24 HOURS: 4/10
LOWEST PAIN SEVERITY IN PAST 24 HOURS: 2/10
PERSON REPORTING PAIN: PATIENT
PAIN LOCATION: RIGHT LEG

## 2025-03-04 ASSESSMENT — ACTIVITIES OF DAILY LIVING (ADL)
PHYSICAL TRANSFERS ASSESSED: 1
AMBULATION_DISTANCE/DURATION_TOLERATED: 100'
AMBULATION ASSISTANCE: SUPERVISION
AMBULATION ASSISTANCE ON FLAT SURFACES: 1
AMBULATION ASSISTANCE: 1
CURRENT_FUNCTION: SUPERVISION

## 2025-03-05 ENCOUNTER — HOME CARE VISIT (OUTPATIENT)
Dept: HOME HEALTH SERVICES | Facility: HOME HEALTH | Age: 75
End: 2025-03-05
Payer: MEDICARE

## 2025-03-05 VITALS
HEART RATE: 91 BPM | RESPIRATION RATE: 18 BRPM | TEMPERATURE: 98.1 F | SYSTOLIC BLOOD PRESSURE: 128 MMHG | DIASTOLIC BLOOD PRESSURE: 72 MMHG

## 2025-03-05 PROCEDURE — G0299 HHS/HOSPICE OF RN EA 15 MIN: HCPCS | Mod: HHH

## 2025-03-05 ASSESSMENT — ENCOUNTER SYMPTOMS
APPETITE LEVEL: GOOD
LAST BOWEL MOVEMENT: 67269
OCCASIONAL FEELINGS OF UNSTEADINESS: 1
DENIES PAIN: 1

## 2025-03-07 ENCOUNTER — PATIENT OUTREACH (OUTPATIENT)
Dept: PRIMARY CARE | Facility: CLINIC | Age: 75
End: 2025-03-07
Payer: MEDICARE

## 2025-03-07 NOTE — PROGRESS NOTES
Call regarding recent hospitalization.  At time of outreach call the patient feels as if their condition has (improved) since last visit. Patient has not had follow up with PCP as of yet. Patient has had follow up with. Denies questions/concerns at this time.

## 2025-03-11 ENCOUNTER — HOME CARE VISIT (OUTPATIENT)
Dept: HOME HEALTH SERVICES | Facility: HOME HEALTH | Age: 75
End: 2025-03-11
Payer: MEDICARE

## 2025-03-11 PROCEDURE — G0151 HHCP-SERV OF PT,EA 15 MIN: HCPCS | Mod: HHH

## 2025-03-11 SDOH — HEALTH STABILITY: PHYSICAL HEALTH: EXERCISE TYPE: BLE PER HEP

## 2025-03-11 ASSESSMENT — ACTIVITIES OF DAILY LIVING (ADL)
AMBULATION ASSISTANCE ON FLAT SURFACES: 1
AMBULATION ASSISTANCE: 1
AMBULATION_DISTANCE/DURATION_TOLERATED: 100'
PHYSICAL TRANSFERS ASSESSED: 1

## 2025-03-11 ASSESSMENT — ENCOUNTER SYMPTOMS
PAIN: 1
LOWEST PAIN SEVERITY IN PAST 24 HOURS: 1/10
HIGHEST PAIN SEVERITY IN PAST 24 HOURS: 4/10
PAIN SEVERITY GOAL: 0/10
PAIN LOCATION: RIGHT ANKLE
PERSON REPORTING PAIN: PATIENT

## 2025-03-15 ENCOUNTER — HOME CARE VISIT (OUTPATIENT)
Dept: HOME HEALTH SERVICES | Facility: HOME HEALTH | Age: 75
End: 2025-03-15
Payer: MEDICARE

## 2025-03-15 VITALS
HEART RATE: 78 BPM | SYSTOLIC BLOOD PRESSURE: 120 MMHG | OXYGEN SATURATION: 99 % | RESPIRATION RATE: 20 BRPM | DIASTOLIC BLOOD PRESSURE: 70 MMHG | TEMPERATURE: 97.9 F

## 2025-03-15 PROCEDURE — G0299 HHS/HOSPICE OF RN EA 15 MIN: HCPCS | Mod: HHH

## 2025-03-15 ASSESSMENT — PAIN SCALES - PAIN ASSESSMENT IN ADVANCED DEMENTIA (PAINAD)
CONSOLABILITY: 0 - NO NEED TO CONSOLE.
CONSOLABILITY: 0
NEGVOCALIZATION: 0 - NONE.
BREATHING: 0
BODYLANGUAGE: 0
FACIALEXPRESSION: 0
NEGVOCALIZATION: 0
TOTALSCORE: 0
BODYLANGUAGE: 0 - RELAXED.
FACIALEXPRESSION: 0 - SMILING OR INEXPRESSIVE.

## 2025-03-15 ASSESSMENT — ACTIVITIES OF DAILY LIVING (ADL)
OASIS_M1830: 00
HOME_HEALTH_OASIS: 00

## 2025-03-15 ASSESSMENT — ENCOUNTER SYMPTOMS
HIGHEST PAIN SEVERITY IN PAST 24 HOURS: 0/10
DENIES PAIN: 1
PERSON REPORTING PAIN: PATIENT

## 2025-03-21 ENCOUNTER — TELEPHONE (OUTPATIENT)
Dept: PRIMARY CARE | Facility: CLINIC | Age: 75
End: 2025-03-21

## 2025-03-21 ENCOUNTER — APPOINTMENT (OUTPATIENT)
Dept: PRIMARY CARE | Facility: CLINIC | Age: 75
End: 2025-03-21
Payer: MEDICARE

## 2025-03-21 VITALS
SYSTOLIC BLOOD PRESSURE: 138 MMHG | DIASTOLIC BLOOD PRESSURE: 88 MMHG | RESPIRATION RATE: 18 BRPM | HEIGHT: 67 IN | WEIGHT: 197 LBS | BODY MASS INDEX: 30.92 KG/M2

## 2025-03-21 DIAGNOSIS — I51.7 ENLARGED RV (RIGHT VENTRICLE): ICD-10-CM

## 2025-03-21 DIAGNOSIS — K21.9 GASTROESOPHAGEAL REFLUX DISEASE WITHOUT ESOPHAGITIS: ICD-10-CM

## 2025-03-21 DIAGNOSIS — I35.0 AORTIC VALVE STENOSIS, ETIOLOGY OF CARDIAC VALVE DISEASE UNSPECIFIED: ICD-10-CM

## 2025-03-21 DIAGNOSIS — Z09 HOSPITAL DISCHARGE FOLLOW-UP: Primary | ICD-10-CM

## 2025-03-21 DIAGNOSIS — S82.891A CLOSED FRACTURE OF RIGHT ANKLE, INITIAL ENCOUNTER: ICD-10-CM

## 2025-03-21 DIAGNOSIS — C61 PROSTATE CANCER (MULTI): ICD-10-CM

## 2025-03-21 DIAGNOSIS — I10 HYPERTENSION, UNSPECIFIED TYPE: ICD-10-CM

## 2025-03-21 DIAGNOSIS — N18.31 STAGE 3A CHRONIC KIDNEY DISEASE (MULTI): ICD-10-CM

## 2025-03-21 NOTE — PROGRESS NOTES
"Subjective   Patient ID: Julio Miller is a 74 y.o. male who presents for Hospital Follow-up and Ankle Injury.  [facility]    Hospital follow up. MAIRA complete.   Admitted to [facility]. Admission dates 2/17/25 - 2/21/25. Admitted for closed fracture of right ankle.   Days since discharge 1 month.   Patient had BW, XR ankle right 3+ views, XR foot right 3+ views, XR chest 1 view, CT abdomen pelvis w IV contrast, CT head wo IV contrast, CT cervical spine wo IV contrast, CT ankle right wo IV contrast, CT angio chest for pulmonary embolism, FL fluoro images no charge, ECG 12 Lead, Transthoracic Echo (TTE) Limited.   Patient advised to follow up with ortho, gi and pcp.   Patient was prescribed aspirin 81 mg, OxyCODONE 5 mg, pantoprazole 40 mg, Senna Plus 8.6-50 mg.     Taking current medications which were reviewed.  Problem list discussed.    Overall doing better  Eating okay.  Staying active.  Continues to follow-up with specialist    Has no other new problem /question.     ROS  Constitutional-  No appetite change.  Eyes- Denies vision changes.  Respiratory- No shortness of breath.  Cardiovascular- No palpitations. No chest pain.  GI- No nausea or vomiting. No diarrhea or constipation. Denies abdominal pain.  Extremities- No edema.  Neurological- Denies headaches. Denies dizziness.  Skin- No rashes.  Psychiatric/Behavioral- Denies significant anxiety, or depressed mood.     Objective     /88   Resp 18   Ht 1.702 m (5' 7.01\")   Wt 89.4 kg (197 lb)   BMI 30.85 kg/m²     Allergies   Allergen Reactions    Cat Dander Itching    Cefuroxime Axetil Diarrhea    House Dust Itching    Mold Unknown    Testosterone Other and Rash     Dermatitis with the patch form       Constitutional-- Well-nourished.  No distress  Head- unremarkable.  Eyes- PERRL.  Conjunctiva normal.  Nose- Normal.  No rhinorrhea noted.  Throat- Oropharynx is clear and moist.  Neck- Supple with no thyromegaly.  No significant cervical adenopathy " noted.  Pulmonary/Chest- Breath sounds normal with normal effort.  No wheezing.  Heart- Regular rate and rhythm.  No murmur.  Abdomen- Soft and non-tender.  No masses noted.  Musculoskeletal-ankle still in a boot.  Continues to follow-up with orthopedics.  OA changes hands noted  Extremities- No edema.   Neurological- Alert.  No noted deficits.  Skin- Warm.  No rashes.  Psychiatric/Behavioral- Mood and affect normal.  Behavior normal.     Assessment/Plan   1. Hospital discharge follow-up        2. Closed fracture of right ankle, initial encounter        3. Enlarged RV (right ventricle)  Referral to Cardiology      4. Aortic valve stenosis, etiology of cardiac valve disease unspecified  Referral to Cardiology      5. Hypertension, unspecified type  Referral to Cardiology      6. Stage 3a chronic kidney disease (Multi)        7. Gastroesophageal reflux disease without esophagitis        8. Prostate cancer (Multi)               Long talk. Treatment options reviewed.  Hospital test results reviewed with him in detail.  Enlarged right ventricle and aortic stenosis discussed.  Will refer to cardiology  GERD stable  Ankle healing well  History of prostate cancer in remission  Continue and take your medications as prescribed.    Health Maintenance issues discussed.    Importance of healthy diet and regular exercise regimen discussed.    We will contact you with any test results ordered. If you do not hear from us, please contact.    Follow-up as instructed or sooner if any problems or symptoms do not resolve as expected.

## 2025-03-21 NOTE — TELEPHONE ENCOUNTER
Rufus Hudson MD  Do [id number] [facility]care1 [id number] minutes ago (4:04 PM)       Okay to wait until June.  Please let him know and arrange the appointment.  Thanks       LMOM WITH APPOINTMENT DAY AND TIME.

## 2025-03-21 NOTE — TELEPHONE ENCOUNTER
YOU SAW PATIENT TODAY AND WANTED HIM TO SEE DR. JAY. DR. JAY'S 1ST AVAILABLE IS 06/19/2025 @ 10:00 IS IT OK FOR PATIENT TO WAIT UNTIL THEN?    PLEASE ADVISE.

## 2025-04-04 ENCOUNTER — PATIENT OUTREACH (OUTPATIENT)
Dept: PRIMARY CARE | Facility: CLINIC | Age: 75
End: 2025-04-04
Payer: MEDICARE

## 2025-04-04 NOTE — PROGRESS NOTES
Unable to reach patient for discharge follow up call.   LVM with call back number for patient to call if needed   If no voicemail available call attempts x 2 were made to contact the patient to assist with any questions or concerns patient may have.     Marily Reeves, PNP

## 2025-04-30 DIAGNOSIS — E78.00 HYPERCHOLESTEROLEMIA: ICD-10-CM

## 2025-04-30 RX ORDER — ROSUVASTATIN CALCIUM 5 MG/1
5 TABLET, COATED ORAL EVERY OTHER DAY
Qty: 90 TABLET | Refills: 0 | Status: SHIPPED | OUTPATIENT
Start: 2025-04-30

## 2025-05-14 ENCOUNTER — PATIENT OUTREACH (OUTPATIENT)
Dept: PRIMARY CARE | Facility: CLINIC | Age: 75
End: 2025-05-14
Payer: MEDICARE

## 2025-06-04 ENCOUNTER — TELEPHONE (OUTPATIENT)
Dept: RADIATION ONCOLOGY | Facility: HOSPITAL | Age: 75
End: 2025-06-04
Payer: MEDICARE

## 2025-06-04 NOTE — TELEPHONE ENCOUNTER
Called pt. to remind of appointment on 6/6/2025 at  2:50 pm with DANIEL Hitchcock. Pt's phone went to voicemail left number if needs to reschedule.

## 2025-06-10 ENCOUNTER — LAB (OUTPATIENT)
Dept: LAB | Facility: HOSPITAL | Age: 75
End: 2025-06-10
Payer: MEDICARE

## 2025-06-10 DIAGNOSIS — C61 MALIGNANT NEOPLASM OF PROSTATE (MULTI): ICD-10-CM

## 2025-06-10 LAB — PSA SERPL-MCNC: <0.1 NG/ML

## 2025-06-10 PROCEDURE — 84153 ASSAY OF PSA TOTAL: CPT

## 2025-06-19 ENCOUNTER — OFFICE VISIT (OUTPATIENT)
Dept: CARDIOLOGY | Facility: CLINIC | Age: 75
End: 2025-06-19
Payer: MEDICARE

## 2025-06-19 VITALS
SYSTOLIC BLOOD PRESSURE: 152 MMHG | BODY MASS INDEX: 31.39 KG/M2 | HEART RATE: 77 BPM | OXYGEN SATURATION: 95 % | DIASTOLIC BLOOD PRESSURE: 86 MMHG | WEIGHT: 200 LBS | HEIGHT: 67 IN

## 2025-06-19 DIAGNOSIS — I10 HYPERTENSION, UNSPECIFIED TYPE: ICD-10-CM

## 2025-06-19 DIAGNOSIS — R94.31 ABNORMAL ELECTROCARDIOGRAM (ECG) (EKG): ICD-10-CM

## 2025-06-19 DIAGNOSIS — I25.10 CORONARY ARTERY CALCIFICATION: ICD-10-CM

## 2025-06-19 DIAGNOSIS — E78.5 DYSLIPIDEMIA: ICD-10-CM

## 2025-06-19 DIAGNOSIS — I35.0 AORTIC VALVE STENOSIS, ETIOLOGY OF CARDIAC VALVE DISEASE UNSPECIFIED: ICD-10-CM

## 2025-06-19 DIAGNOSIS — I35.0 NONRHEUMATIC AORTIC VALVE STENOSIS: Primary | ICD-10-CM

## 2025-06-19 DIAGNOSIS — I51.7 ENLARGED RV (RIGHT VENTRICLE): ICD-10-CM

## 2025-06-19 DIAGNOSIS — I10 BENIGN ESSENTIAL HYPERTENSION: ICD-10-CM

## 2025-06-19 PROBLEM — R01.1 HEART MURMUR: Status: RESOLVED | Noted: 2023-05-12 | Resolved: 2025-06-19

## 2025-06-19 PROCEDURE — 3077F SYST BP >= 140 MM HG: CPT | Performed by: INTERNAL MEDICINE

## 2025-06-19 PROCEDURE — 99214 OFFICE O/P EST MOD 30 MIN: CPT | Performed by: INTERNAL MEDICINE

## 2025-06-19 PROCEDURE — 1159F MED LIST DOCD IN RCRD: CPT | Performed by: INTERNAL MEDICINE

## 2025-06-19 PROCEDURE — 1160F RVW MEDS BY RX/DR IN RCRD: CPT | Performed by: INTERNAL MEDICINE

## 2025-06-19 PROCEDURE — 3079F DIAST BP 80-89 MM HG: CPT | Performed by: INTERNAL MEDICINE

## 2025-06-19 PROCEDURE — 1036F TOBACCO NON-USER: CPT | Performed by: INTERNAL MEDICINE

## 2025-06-19 PROCEDURE — 99212 OFFICE O/P EST SF 10 MIN: CPT | Mod: 25

## 2025-06-19 PROCEDURE — 93005 ELECTROCARDIOGRAM TRACING: CPT | Performed by: INTERNAL MEDICINE

## 2025-06-19 PROCEDURE — 93010 ELECTROCARDIOGRAM REPORT: CPT | Performed by: INTERNAL MEDICINE

## 2025-06-19 NOTE — PATIENT INSTRUCTIONS
1) Check BP for 2 wks and call with readings    2) Increase rosuvastatin to 10mg daily (2 pills of your 5mg tablets)    3) when you call about the BP let us know how you are doing on the higher rosuvastatin dose

## 2025-06-19 NOTE — PROGRESS NOTES
Chief Complaint:   No chief complaint on file.     History Of Present Illness:    Julio Miller is a 74 y.o. male with history of coronary artery disease, hypertension, dyslipidemia, abnormal ECG, and aortic stenosis here for cardiovascular evaluation.    He has no exertional chest pain or shortness of breath.  He is very active.  He plays doubles tennis twice a week for the last 20 years.  Although he has noticed a slight decrease in his stamina over the last several years he believes that this is not anything unexpected.  He rides a mountain bike.  He likes to hike, fly fish and cross-country ski as well.    He has had palpitations over the years.  They last about 10 to 15 seconds often times when he first lays down at night.  It might happen once or twice a week and has not changed in frequency, duration or severity over the past several years.    Underwent a stress test several years ago and eventually a heart catheterization in 2012.  This was performed at [facility].  We actually pulled up the images and went over the films.  He did have calcification of the left main, circumflex and LAD.  There was ostial circumflex disease of about 50% as well as ostial LAD disease of about 40%.  There was a smaller caliber RCA with sequential 30 to 40% stenoses in the proximal to mid vessel.  No intervention was needed at that time.    He was recently admitted to [facility] after injuring his ankle.  He had food poisoning and had severe nausea and abdominal pain.  He got up to go to the bathroom and passed out likely secondary to vasovagal event.  He injured his ankle at that time.    Workup included a CT scan of the chest which demonstrated severe coronary artery calcification.  A murmur was heard on exam and an echocardiogram was performed.    He did have swelling of the ankle for some time but it has significantly improved.    ECG 2/17/2025: Normal sinus rhythm.  T wave inversions in III and  aVF.  Nonspecific ST/T changes V3-V6.    Limited echocardiogram 2/18/2025: EF 60 to 65%.  Grade I diastolic dysfunction.  Moderately enlarged RV with abnormal motion consistent with Morales sign.  RVSP 34 mmHg.  Mild aortic stenosis.    CT angio chest 2/18/25:     Echocardiogram 3/24/2022: EF 55 to 60%.  Grade I diastolic dysfunction.  Mildly dilated RV with normal function.  Trivial to 1+ MR and TR.  RVSP 32 mmHg.  Mild aortic stenosis.  Mild to moderate aortic regurgitation.    Cath 2012 40% ostial Lcx      Past Medical History:  He has a past medical history of Encounter for general adult medical examination without abnormal findings (03/01/2022), Encounter for screening for malignant neoplasm of colon, Encounter for screening for malignant neoplasm of colon (03/18/2022), Encounter for screening for malignant neoplasm of prostate (10/21/2015), Other conditions influencing health status (02/17/2021), Palpitations (08/18/2013), Personal history of diseases of the skin and subcutaneous tissue (01/15/2016), Personal history of other diseases of the respiratory system (02/19/2021), and Tinea cruris.    Past Surgical History:  He has a past surgical history that includes Other surgical history (04/15/2022); Other surgical history (04/15/2022); Other surgical history (04/15/2022); and Other surgical history (04/15/2022).      Social History:  He reports that he has never smoked. He has never used smokeless tobacco. He reports current alcohol use. He reports that he does not use drugs.    Family History:  Family History[1]     Allergies:  Cat dander, Cefuroxime axetil, House dust, Mold, and Testosterone    Outpatient Medications:  Current Outpatient Medications   Medication Instructions    amLODIPine (NORVASC) 2.5 mg, oral, Daily    rosuvastatin (CRESTOR) 5 mg, oral, Every other day, Alternate with Tamsulosin at bedtime    tamsulosin (FLOMAX) 0.4 mg, oral, Every other day, Alternate with Rosuvastatin       Last  "Recorded Vitals:  Visit Vitals  /86 (BP Location: Right arm, Patient Position: Sitting)   Pulse 77   Ht 1.702 m (5' 7\")   Wt 90.7 kg (200 lb)   SpO2 95%   BMI 31.32 kg/m²   Smoking Status Never   BSA 2.07 m²      LASTWT(3):   Wt Readings from Last 3 Encounters:   06/19/25 90.7 kg (200 lb)   03/21/25 89.4 kg (197 lb)   02/18/25 95 kg (209 lb 7 oz)       Physical Exam:  HEENT: Carotid upstrokes normal with referred bilateral heart murmur. JVP is normal.  Pulmonary: Clear to auscultation bilaterally.  Cardiovascular: S1, S2, regular. II/VI early peaking crescendo decrescendo murmur at the right upper sternal border. No rubs or gallops.   Lower extremities: Warm. 2+ distal pulses.  Trivial bipedal edema.  Mild varicosities bilaterally.     Last Labs:  CBC -  Recent Labs     02/21/25  0546 02/20/25  0548 02/19/25  0530   WBC 5.9 7.2 7.9   HGB 12.8* 13.0* 12.5*   HCT 39.3* 42.3 38.0*   * 111* 135*   MCV 95 100 94       CMP -  Recent Labs     02/21/25  0546 02/20/25  0548 02/19/25  0530    134* 139   K 3.7 4.4 4.0    101 105   CO2 26 19* 27   ANIONGAP 14 18 11   BUN 20 14 11   CREATININE 1.36* 1.31* 1.33*   EGFR 54* 57* 56*   MG 2.05 2.23 1.99     Recent Labs     02/21/25  0546 02/20/25  0548 02/19/25  0530 02/18/25  0632 02/17/25  2101 03/22/24  1038 03/22/24  1038 02/27/23  1106 01/09/23  1128   ALBUMIN 3.8 3.9 3.6   < > 4.4   < > 4.6  --  4.8   ALKPHOS  --   --   --   --  56  --  65  --  72   ALT  --   --   --   --  21  --  15 15 14   AST  --   --   --   --  21 --  20  --  19   BILITOT  --   --   --   --  0.6  --  0.8  --  0.8    < > = values in this interval not displayed.       LIPID PANEL -   Recent Labs     03/22/24  1038 05/10/23  1059 02/27/23  1106 01/09/23  1128   CHOL 197 204* 193 252*   LDLCALC 124*  --   --   --    LDLF  --  136* 118* 187*   HDL 45.3 44.2 46.9 43.2   TRIG 141 121 141 108       Recent Labs     02/17/25  2101 03/22/24  1038 05/10/23  1059   BNP 16  --   --    HGBA1C  " --  5.4 5.6           Assessment/Plan   1) coronary artery calcification/CAD: Patient with angiographic coronary disease by catheterization 2012.  Noted to have severe calcification on recent CT angiogram of the chest.  He has no exertional chest pain or shortness of breath that would be concerning for obstructive coronary disease.  He is rather active.  ECG is normal.    At this point there is no role for further stress testing.  Would recommend lifestyle and risk factor modification.    We talked about continuing to exercise.  I mentioned using the Mediterranean diet as a guideline.  I will address hypertension and dyslipidemia below.    2) hypertension: Pressure elevated today but says that he does have whitecoat hypertension.  Wife has a blood pressure machine at home.  Asked him to check blood pressures and give us a call over the next couple weeks.  Could always consider up titration of amlodipine or addition of a thiazide like diuretic.    3) dyslipidemia: LDL should be ideally less than 100 and optimally less than 70.  Believes that he had nasal congestion and other side effects with higher doses of rosuvastatin in the past.  I told him that these may have been coincidental.  I would like to uptitrate the rosuvastatin to 10 mg daily.  He should watch out for side effects.  When he calls with blood pressure readings he should also let us know how he is tolerating the rosuvastatin 10 mg daily.    In the event that he is tolerating the 10 mg I would like to increase to 20 mg and then check lipids again a few months later.    In the event that he is having side effects from rosuvastatin we can try atorvastatin.    4) abnormal ECG: Transiently abnormal ECG at the time of pain from his ankle fracture.  Current ECG is normal.  We should continue to observe    5) aortic stenosis: Sounds mild by exam and by most recent echocardiogram.  Of no clinical concern at this time.    6) follow-up: 6 months or sooner if  needed      Juan F Hendricks MD         [1]   Family History  Problem Relation Name Age of Onset    Migraines Mother      Other (Heart palpitations) Mother      Alzheimer's disease Father      Prostatitis Father      Parkinsonism Father

## 2025-06-24 ENCOUNTER — TELEPHONE (OUTPATIENT)
Dept: RADIATION ONCOLOGY | Facility: HOSPITAL | Age: 75
End: 2025-06-24
Payer: MEDICARE

## 2025-06-24 ASSESSMENT — ENCOUNTER SYMPTOMS
HEMATURIA: 0
SHORTNESS OF BREATH: 0
COUGH: 0
DIARRHEA: 0
WEAKNESS: 0
DYSURIA: 0
FEVER: 0
ALLERGIC/IMMUNOLOGIC NEGATIVE: 1
BLOOD IN STOOL: 0
ABDOMINAL PAIN: 0
JOINT SWELLING: 0
RECTAL PAIN: 0
PALPITATIONS: 0
CHEST TIGHTNESS: 0
PSYCHIATRIC NEGATIVE: 1
DIZZINESS: 0
ARTHRALGIAS: 0
CONSTIPATION: 0
ANAL BLEEDING: 0
FATIGUE: 0
FREQUENCY: 0
BACK PAIN: 0
UNEXPECTED WEIGHT CHANGE: 0
DIFFICULTY URINATING: 0

## 2025-06-24 NOTE — PROGRESS NOTES
Cancer synopsis:  Rad/onc: Dr. Newberry-Dr. Cummings/ [facility]  Urology: Dr. Caba     74 [id number] favorable intermediate risk prostate cancer, PSA 9.2, Perry 3+4=7 in 3/19 cores, 40-80%, from pirads5 PSMA PET+ anterior apex lesion, possible  EPE anteriorly on MRI. 48cc. PVR 25cc      PRIOR TREATMENT: SBRT utilizing 10 MV photons, VMAT FFF technique, 40Gy in 5 fractions of 8Gy each.  Treatment completed on 9/8/2022.  No androgen ablation used.  SpaceOAR gel and fiducials placed prior to treatment.    Recent imaging:  none    PMH:  HLD, GERD, HTN, depression, spermatoocele, OAB    History of presenting illness:    Patient ID: [id number]     Julio Miller is a 74 y.o. male who presents for his FIR prostate 3+4=7 IPSA <10 s/p RT.    RT Site: Prostate  RT Date: 09/08/2022  Hormone therapy: No  Hot Flushes: Denies  Fatigue: Denies  Bone pain: Denies  ED: Does report some erectile changes since RT.  ED medications: No  IPSS: n/a virtual  Urinary symptoms: Admits to reduced stream without flowmax, has been using every other day with reduction SE. Denies dysuria, hematuria.  Reports has noticed on occasion some difficulty   Urinary Medications: Yes, flowmax (every other day)  Rectal bleeding: Denies  Colonoscopy:06/2022: Multiple polyps removed, hemmoirds noted internally non bleeding. Next due in 5yrs  Other systems: Denies SOB, CP or fever.    Review of systems:  Review of Systems   Constitutional:  Negative for fatigue, fever and unexpected weight change.   Respiratory:  Negative for cough, chest tightness and shortness of breath.    Cardiovascular:  Negative for chest pain, palpitations and leg swelling.   Gastrointestinal:  Negative for abdominal pain, anal bleeding, blood in stool, constipation, diarrhea and rectal pain.   Endocrine: Negative for cold intolerance, heat intolerance and polyuria.   Genitourinary:  Negative for decreased urine volume, difficulty urinating, dysuria, frequency, hematuria and urgency.    Musculoskeletal:  Negative for arthralgias, back pain, gait problem and joint swelling.   Skin: Negative.    Allergic/Immunologic: Negative.    Neurological:  Negative for dizziness, syncope and weakness.   Psychiatric/Behavioral: Negative.       Past Medical history  Past Medical History:   Diagnosis Date    Encounter for general adult medical examination without abnormal findings 03/01/2022    Medicare annual wellness visit, subsequent    Encounter for screening for malignant neoplasm of colon     Screen for colon cancer    Encounter for screening for malignant neoplasm of colon 03/18/2022    Colon cancer screening    Encounter for screening for malignant neoplasm of prostate 10/21/2015    Screening for prostate cancer    Other conditions influencing health status 02/17/2021    History of cough    Palpitations 08/18/2013    Palpitations    Personal history of diseases of the skin and subcutaneous tissue 01/15/2016    History of contact dermatitis    Personal history of other diseases of the respiratory system 02/19/2021    History of nasal discharge    Tinea cruris     Jock itch      Surgical/family history  Family History   Problem Relation Name Age of Onset    Migraines Mother      Other (Heart palpitations) Mother      Alzheimer's disease Father      Prostatitis Father      Parkinsonism Father        Past Surgical History:   Procedure Laterality Date    OTHER SURGICAL HISTORY  04/15/2022    Foot surgery    OTHER SURGICAL HISTORY  04/15/2022    Vasectomy    OTHER SURGICAL HISTORY  04/15/2022    Excision of spermatocele    OTHER SURGICAL HISTORY  04/15/2022    Hemorrhoidectomy simple      Social History  Tobacco Use: Low Risk  (6/19/2025)    Patient History     Smoking Tobacco Use: Never     Smokeless Tobacco Use: Never     Passive Exposure: Not on file       Current med list:  Current Outpatient Medications   Medication Instructions    amLODIPine (NORVASC) 2.5 mg, oral, Daily    rosuvastatin (CRESTOR) 5 mg,  oral, Every other day, Alternate with Tamsulosin at bedtime    tamsulosin (FLOMAX) 0.4 mg, oral, Every other day, Alternate with Rosuvastatin      Last recorded vital:  N/a Virtual    Physical exam  N/a virtual    Pertinent labs:  Prostate Specific AG   Date/Time Value Ref Range Status   06/10/2025 10:23 AM <0.10 <=4.00 ng/mL Final     Dx:  Problem List Items Addressed This Visit    None  Visit Diagnoses         Malignant neoplasm of prostate (Multi)    -  Primary        Psa is <0.10 and is considered undectable Review of latent SE including rectal bleeding, hematuria, urinary strictures, ED where reviewed as well as how to contact office if s/s present. Does report ED not concerned at this time however and will monitor. Denies latent SE. NCCN guidelines where reviewed and routine FUV of every 3m for first year and every 6m for four years for a total of five years was discussed. Patient verbalized understanding.     BPH:  Discussed weak stream and increasing flowmax to daily if worsening.     PLAN:  FUV 6m virtual   Labs Psa in 6m  Imaging none  Flowmax daily  FUV other providers: PCP for routine FUV, Urology PRN    Please contact office with any concerns:  Romero Scruggs CNP  [phone]    I performed this visit using realtime telehealth tools, including an audio/video OR telephone connection between patient’s name and location Julio Miller and Romero Hitchcock CNP.    2. POS 10: Telehealth provided in patient's home.  o Patient is located in their home (which is a location other than a hospital or other facility where the patient receives care in a private residence) when receiving health services or health related services through telecommunication technology.

## 2025-06-25 ENCOUNTER — HOSPITAL ENCOUNTER (OUTPATIENT)
Dept: RADIATION ONCOLOGY | Facility: HOSPITAL | Age: 75
Setting detail: RADIATION/ONCOLOGY SERIES
Discharge: HOME | End: 2025-06-25
Payer: MEDICARE

## 2025-06-25 DIAGNOSIS — N40.1 BENIGN PROSTATIC HYPERPLASIA WITH URINARY HESITANCY: ICD-10-CM

## 2025-06-25 DIAGNOSIS — R39.11 BENIGN PROSTATIC HYPERPLASIA WITH URINARY HESITANCY: ICD-10-CM

## 2025-06-25 DIAGNOSIS — C61 MALIGNANT NEOPLASM OF PROSTATE (MULTI): Primary | ICD-10-CM

## 2025-06-25 PROCEDURE — 99213 OFFICE O/P EST LOW 20 MIN: CPT

## 2025-06-25 PROCEDURE — 99213 OFFICE O/P EST LOW 20 MIN: CPT | Mod: 95

## 2025-07-07 ENCOUNTER — TELEPHONE (OUTPATIENT)
Dept: CARDIOLOGY | Facility: CLINIC | Age: 75
End: 2025-07-07
Payer: MEDICARE

## 2025-07-07 NOTE — TELEPHONE ENCOUNTER
Julio called in today to give his most recent bp readings all done between 10-11 am pt states  he has switched to a mediterranean diet as recommended. Says he's been tolerating the double dose of the rosuvastatin.     6/22/25 137/77  6/23/25 137/71  6/24/25 141/86 hr 70  6/25/25  140/84 hr 73  6/26/25 134/86 hr 80  6/27/25 132/78 hr 79  6/29/25 136/79 hr 74   6/30/25 134/75 hr 87  7/1/25  132/79 hr 82  7/2/25 131/81 hr 84  7/3/25 135/80 hr 74  7/4/25 137/82 hr 86  7/5/25 129/80 hr 78  7/6/25 124/70 hr 83  7/7/25 136/75 hr 73

## 2025-07-11 DIAGNOSIS — E78.00 HYPERCHOLESTEROLEMIA: ICD-10-CM

## 2025-07-11 RX ORDER — ROSUVASTATIN CALCIUM 10 MG/1
10 TABLET, COATED ORAL DAILY
Qty: 90 TABLET | Refills: 1 | Status: SHIPPED | OUTPATIENT
Start: 2025-07-11 | End: 2026-01-07

## 2025-07-11 NOTE — TELEPHONE ENCOUNTER
Patient is calling because he states you had referred him to a cardiologist, Dr Hendricks and he wanted him to double up on his Rosuvastin 5 mg. So he's been taking 10 mg and he's running out faster than normal. (He's almost out). Wanted to know if you could send in the RX for Rosuvastatin 10 mg to his pharmacy    Rx Refill Request Telephone Encounter    Name:  Julio Miller  : 1950     Medication Name:  Rosuvastatin  Dose (Optional):    10 mg  Quantity (Optional):      Directions (Optional):       ALLERGIES:   see list     Specific Pharmacy location:  [facility]    Date of last appointment:  3/21/25  Date of next appointment:  none     Best number to reach patient:  [phone]

## 2025-08-12 ENCOUNTER — PATIENT MESSAGE (OUTPATIENT)
Dept: PRIMARY CARE | Facility: CLINIC | Age: 75
End: 2025-08-12
Payer: MEDICARE

## 2025-08-14 DIAGNOSIS — I10 HYPERTENSION, UNSPECIFIED TYPE: ICD-10-CM

## 2025-08-14 RX ORDER — AMLODIPINE BESYLATE 2.5 MG/1
2.5 TABLET ORAL DAILY
Qty: 30 TABLET | Refills: 0 | Status: SHIPPED | OUTPATIENT
Start: 2025-08-14

## 2025-09-05 DIAGNOSIS — I10 HYPERTENSION, UNSPECIFIED TYPE: ICD-10-CM

## 2025-09-05 RX ORDER — AMLODIPINE BESYLATE 2.5 MG/1
2.5 TABLET ORAL DAILY
Qty: 30 TABLET | Refills: 0 | Status: SHIPPED | OUTPATIENT
Start: 2025-09-05

## 2025-09-30 ENCOUNTER — APPOINTMENT (OUTPATIENT)
Dept: PRIMARY CARE | Facility: CLINIC | Age: 75
End: 2025-09-30
Payer: MEDICARE

## (undated) DEVICE — BLADE, GEN COATED 2.75, LF

## (undated) DEVICE — SPLINT, FAST SETTING, 5 X 30 IN, PLASTER

## (undated) DEVICE — BANDAGE, ELASTIC, SELF-CLOSE, 4 IN, HONEYCOMB, STERILE

## (undated) DEVICE — APPLICATOR, CHLORAPREP, W/ORANGE TINT, 26ML

## (undated) DEVICE — SOLUTION, IRRIGATION, STERILE WATER, 1000 ML, POUR BOTTLE

## (undated) DEVICE — COVER, TABLE, 54X90

## (undated) DEVICE — Device

## (undated) DEVICE — SOLUTION, IRRIGATION, SODIUM CHLORIDE 0.9%, 1000 ML, POUR BOTTLE

## (undated) DEVICE — SUTURE, ETHILON, 4-0, BLK, MONO, PS-2 18

## (undated) DEVICE — DRESSING, GAUZE, SPONGE, VERSALON, 4 PLY, 4 X 4 IN, BULK, NS

## (undated) DEVICE — BANDAGE, ELASTIC, SELF-CLOSE, 6 IN, HONEYCOMB, STERILE

## (undated) DEVICE — SUTURE, ETHILON, 3-0, 18 IN, PS2, BLACK

## (undated) DEVICE — MARKER, SKIN, DUAL TIP, W/RULER AND LABEL

## (undated) DEVICE — SUTURE, VICRYL, 3-0, 27 IN, SH

## (undated) DEVICE — SUTURE, POLYSORB, 2-0, 30 IN, V20, UNDYED

## (undated) DEVICE — PADDING, WEBRIL, UNDERCAST, STERILE, 6 IN

## (undated) DEVICE — DRESSING, GAUZE, PETROLATUM, PATCH, XEROFORM, 1 X 8 IN, STERILE

## (undated) DEVICE — BANDAGE, ESMARK, 6 IN X 12 FT

## (undated) DEVICE — SUTURE, MONOCRYL, 4-0, 27 IN, PS-2, UNDYED

## (undated) DEVICE — TOWEL PACK, STERILE, 4/PACK, BLUE

## (undated) DEVICE — PADDING, WEBRIL, UNDERCAST, STERILE, 4 IN

## (undated) DEVICE — GLOVE, SURGICAL, PROTEXIS PI MICRO, 8.0, PF, LF

## (undated) DEVICE — TIP, SUCTION, FRAZIER, W/CONTROL VENT, 12 FR